# Patient Record
Sex: FEMALE | Race: BLACK OR AFRICAN AMERICAN | NOT HISPANIC OR LATINO
[De-identification: names, ages, dates, MRNs, and addresses within clinical notes are randomized per-mention and may not be internally consistent; named-entity substitution may affect disease eponyms.]

---

## 2017-01-31 ENCOUNTER — APPOINTMENT (OUTPATIENT)
Dept: SURGERY | Facility: CLINIC | Age: 62
End: 2017-01-31

## 2017-01-31 ENCOUNTER — APPOINTMENT (OUTPATIENT)
Dept: BARIATRICS | Facility: CLINIC | Age: 62
End: 2017-01-31

## 2017-01-31 VITALS
BODY MASS INDEX: 48.23 KG/M2 | HEART RATE: 91 BPM | HEIGHT: 65.5 IN | TEMPERATURE: 97.5 F | DIASTOLIC BLOOD PRESSURE: 80 MMHG | SYSTOLIC BLOOD PRESSURE: 165 MMHG | WEIGHT: 293 LBS | OXYGEN SATURATION: 95 %

## 2017-02-28 ENCOUNTER — APPOINTMENT (OUTPATIENT)
Dept: SURGERY | Facility: CLINIC | Age: 62
End: 2017-02-28

## 2017-02-28 VITALS
HEIGHT: 65.5 IN | BODY MASS INDEX: 48.23 KG/M2 | HEART RATE: 92 BPM | DIASTOLIC BLOOD PRESSURE: 76 MMHG | WEIGHT: 293 LBS | SYSTOLIC BLOOD PRESSURE: 176 MMHG | OXYGEN SATURATION: 93 % | TEMPERATURE: 97.7 F

## 2017-03-23 ENCOUNTER — APPOINTMENT (OUTPATIENT)
Dept: SURGERY | Facility: CLINIC | Age: 62
End: 2017-03-23

## 2017-03-23 VITALS — BODY MASS INDEX: 66.79 KG/M2 | WEIGHT: 293 LBS

## 2017-03-31 ENCOUNTER — APPOINTMENT (OUTPATIENT)
Dept: INTERNAL MEDICINE | Facility: CLINIC | Age: 62
End: 2017-03-31

## 2017-04-07 ENCOUNTER — APPOINTMENT (OUTPATIENT)
Dept: INTERNAL MEDICINE | Facility: CLINIC | Age: 62
End: 2017-04-07

## 2017-04-07 VITALS
WEIGHT: 293 LBS | BODY MASS INDEX: 68.15 KG/M2 | TEMPERATURE: 98.6 F | DIASTOLIC BLOOD PRESSURE: 84 MMHG | RESPIRATION RATE: 17 BRPM | OXYGEN SATURATION: 95 % | HEART RATE: 86 BPM | SYSTOLIC BLOOD PRESSURE: 142 MMHG

## 2017-04-07 VITALS — SYSTOLIC BLOOD PRESSURE: 145 MMHG | DIASTOLIC BLOOD PRESSURE: 80 MMHG

## 2017-04-07 DIAGNOSIS — R09.2 RESPIRATORY ARREST: ICD-10-CM

## 2017-04-07 DIAGNOSIS — Z87.01 PERSONAL HISTORY OF PNEUMONIA (RECURRENT): ICD-10-CM

## 2017-04-07 DIAGNOSIS — Z80.9 FAMILY HISTORY OF MALIGNANT NEOPLASM, UNSPECIFIED: ICD-10-CM

## 2017-04-07 DIAGNOSIS — T78.40XA ALLERGY, UNSPECIFIED, INITIAL ENCOUNTER: ICD-10-CM

## 2017-04-07 DIAGNOSIS — Z82.49 FAMILY HISTORY OF ISCHEMIC HEART DISEASE AND OTHER DISEASES OF THE CIRCULATORY SYSTEM: ICD-10-CM

## 2017-04-14 ENCOUNTER — LABORATORY RESULT (OUTPATIENT)
Age: 62
End: 2017-04-14

## 2017-04-14 ENCOUNTER — APPOINTMENT (OUTPATIENT)
Dept: CARDIOLOGY | Facility: CLINIC | Age: 62
End: 2017-04-14

## 2017-04-14 ENCOUNTER — NON-APPOINTMENT (OUTPATIENT)
Age: 62
End: 2017-04-14

## 2017-04-14 VITALS
TEMPERATURE: 97.9 F | SYSTOLIC BLOOD PRESSURE: 138 MMHG | RESPIRATION RATE: 20 BRPM | DIASTOLIC BLOOD PRESSURE: 81 MMHG | HEART RATE: 85 BPM | OXYGEN SATURATION: 100 % | BODY MASS INDEX: 67.29 KG/M2 | WEIGHT: 293 LBS

## 2017-04-24 ENCOUNTER — RESULT REVIEW (OUTPATIENT)
Age: 62
End: 2017-04-24

## 2017-04-24 DIAGNOSIS — R76.8 OTHER SPECIFIED ABNORMAL IMMUNOLOGICAL FINDINGS IN SERUM: ICD-10-CM

## 2017-04-24 DIAGNOSIS — B00.9 HERPESVIRAL INFECTION, UNSPECIFIED: ICD-10-CM

## 2017-04-24 DIAGNOSIS — R77.8 OTHER SPECIFIED ABNORMALITIES OF PLASMA PROTEINS: ICD-10-CM

## 2017-04-24 DIAGNOSIS — Z20.5 CONTACT WITH AND (SUSPECTED) EXPOSURE TO VIRAL HEPATITIS: ICD-10-CM

## 2017-04-25 ENCOUNTER — APPOINTMENT (OUTPATIENT)
Dept: SURGERY | Facility: CLINIC | Age: 62
End: 2017-04-25

## 2017-04-25 VITALS
HEIGHT: 64 IN | HEART RATE: 91 BPM | BODY MASS INDEX: 50.02 KG/M2 | DIASTOLIC BLOOD PRESSURE: 80 MMHG | SYSTOLIC BLOOD PRESSURE: 167 MMHG | WEIGHT: 293 LBS

## 2017-04-25 LAB
25(OH)D3 SERPL-MCNC: 24.8 NG/ML
ALBUMIN SERPL ELPH-MCNC: 4.2 G/DL
ALP BLD-CCNC: 117 U/L
ALT SERPL-CCNC: 12 U/L
ANION GAP SERPL CALC-SCNC: 16 MMOL/L
AST SERPL-CCNC: 21 U/L
BASOPHILS # BLD AUTO: 0.01 K/UL
BASOPHILS NFR BLD AUTO: 0.2 %
BILIRUB SERPL-MCNC: 0.3 MG/DL
BUN SERPL-MCNC: 13 MG/DL
CALCIUM SERPL-MCNC: 9.9 MG/DL
CHLORIDE SERPL-SCNC: 95 MMOL/L
CHOLEST SERPL-MCNC: 184 MG/DL
CHOLEST/HDLC SERPL: 1.9 RATIO
CO2 SERPL-SCNC: 31 MMOL/L
CREAT SERPL-MCNC: 0.86 MG/DL
EOSINOPHIL # BLD AUTO: 0.07 K/UL
EOSINOPHIL NFR BLD AUTO: 1.2 %
FOLATE SERPL-MCNC: 8.3 NG/ML
GLUCOSE SERPL-MCNC: 89 MG/DL
HAV IGG+IGM SER QL: REACTIVE
HBA1C MFR BLD HPLC: 5.8 %
HBV SURFACE AB SER QL: NONREACTIVE
HBV SURFACE AG SER QL: NONREACTIVE
HCT VFR BLD CALC: 35.3 %
HCV AB SER QL: NONREACTIVE
HCV S/CO RATIO: 0.15 S/CO
HDLC SERPL-MCNC: 96 MG/DL
HGB BLD-MCNC: 10.8 G/DL
HIV1+2 AB SPEC QL IA.RAPID: NONREACTIVE
HSV 1+2 IGG SER IA-IMP: POSITIVE
HSV 1+2 IGG SER IA-IMP: POSITIVE
HSV1 IGG SER QL: 52 INDEX
HSV2 IGG SER QL: 21.7 INDEX
IMM GRANULOCYTES NFR BLD AUTO: 0.2 %
LDLC SERPL CALC-MCNC: 48 MG/DL
LYMPHOCYTES # BLD AUTO: 1.38 K/UL
LYMPHOCYTES NFR BLD AUTO: 23 %
MAN DIFF?: NORMAL
MCHC RBC-ENTMCNC: 30.6 GM/DL
MCHC RBC-ENTMCNC: 31.8 PG
MCV RBC AUTO: 103.8 FL
MONOCYTES # BLD AUTO: 0.27 K/UL
MONOCYTES NFR BLD AUTO: 4.5 %
NEUTROPHILS # BLD AUTO: 4.26 K/UL
NEUTROPHILS NFR BLD AUTO: 70.9 %
PLATELET # BLD AUTO: 251 K/UL
POTASSIUM SERPL-SCNC: 4.4 MMOL/L
PROT SERPL-MCNC: 8.4 G/DL
RBC # BLD: 3.4 M/UL
RBC # FLD: 14.7 %
SODIUM SERPL-SCNC: 142 MMOL/L
T PALLIDUM AB SER QL IA: POSITIVE
T4 FREE SERPL-MCNC: 0.8 NG/DL
TRIGL SERPL-MCNC: 200 MG/DL
TSH SERPL-ACNC: 11.87 UIU/ML
URATE SERPL-MCNC: 10.9 MG/DL
VIT B12 SERPL-MCNC: >2000 PG/ML
WBC # FLD AUTO: 6 K/UL

## 2017-05-01 RX ORDER — PENICILLIN G BENZATHINE 2400000 [IU]/4ML
2400000 INJECTION, SUSPENSION INTRAMUSCULAR
Qty: 1 | Refills: 0 | Status: COMPLETED | OUTPATIENT
Start: 2017-05-01 | End: 1900-01-01

## 2017-05-09 ENCOUNTER — APPOINTMENT (OUTPATIENT)
Dept: CARDIOLOGY | Facility: CLINIC | Age: 62
End: 2017-05-09

## 2017-05-16 ENCOUNTER — APPOINTMENT (OUTPATIENT)
Dept: INTERNAL MEDICINE | Facility: CLINIC | Age: 62
End: 2017-05-16

## 2017-05-16 ENCOUNTER — APPOINTMENT (OUTPATIENT)
Dept: CARDIOLOGY | Facility: CLINIC | Age: 62
End: 2017-05-16

## 2017-05-16 VITALS
OXYGEN SATURATION: 95 % | TEMPERATURE: 98.1 F | HEART RATE: 93 BPM | WEIGHT: 293 LBS | SYSTOLIC BLOOD PRESSURE: 123 MMHG | DIASTOLIC BLOOD PRESSURE: 92 MMHG | BODY MASS INDEX: 67.46 KG/M2 | RESPIRATION RATE: 17 BRPM

## 2017-05-16 DIAGNOSIS — Z87.442 PERSONAL HISTORY OF URINARY CALCULI: ICD-10-CM

## 2017-05-25 ENCOUNTER — APPOINTMENT (OUTPATIENT)
Dept: INTERNAL MEDICINE | Facility: CLINIC | Age: 62
End: 2017-05-25

## 2017-05-31 ENCOUNTER — APPOINTMENT (OUTPATIENT)
Dept: INTERNAL MEDICINE | Facility: CLINIC | Age: 62
End: 2017-05-31

## 2017-06-01 ENCOUNTER — MEDICATION RENEWAL (OUTPATIENT)
Age: 62
End: 2017-06-01

## 2017-06-14 ENCOUNTER — APPOINTMENT (OUTPATIENT)
Dept: INTERNAL MEDICINE | Facility: CLINIC | Age: 62
End: 2017-06-14

## 2017-06-14 ENCOUNTER — APPOINTMENT (OUTPATIENT)
Dept: SURGERY | Facility: CLINIC | Age: 62
End: 2017-06-14

## 2017-06-14 VITALS
SYSTOLIC BLOOD PRESSURE: 194 MMHG | BODY MASS INDEX: 50.02 KG/M2 | HEART RATE: 97 BPM | OXYGEN SATURATION: 92 % | RESPIRATION RATE: 17 BRPM | HEIGHT: 64 IN | TEMPERATURE: 98 F | DIASTOLIC BLOOD PRESSURE: 69 MMHG | WEIGHT: 293 LBS

## 2017-06-14 RX ORDER — PENICILLIN G BENZATHINE 2400000 [IU]/4ML
2400000 INJECTION, SUSPENSION INTRAMUSCULAR
Qty: 1 | Refills: 0 | Status: COMPLETED | OUTPATIENT
Start: 2017-05-03

## 2017-06-22 ENCOUNTER — APPOINTMENT (OUTPATIENT)
Dept: INTERNAL MEDICINE | Facility: CLINIC | Age: 62
End: 2017-06-22

## 2017-06-22 VITALS
DIASTOLIC BLOOD PRESSURE: 81 MMHG | SYSTOLIC BLOOD PRESSURE: 147 MMHG | OXYGEN SATURATION: 97 % | RESPIRATION RATE: 17 BRPM | HEART RATE: 87 BPM | TEMPERATURE: 97.8 F

## 2017-06-22 RX ORDER — PENICILLIN G BENZATHINE 2400000 [IU]/4ML
2400000 INJECTION, SUSPENSION INTRAMUSCULAR
Qty: 1 | Refills: 0 | Status: COMPLETED | OUTPATIENT
Start: 2017-05-10

## 2017-06-23 ENCOUNTER — RESULT REVIEW (OUTPATIENT)
Age: 62
End: 2017-06-23

## 2017-06-23 DIAGNOSIS — Z83.2 FAMILY HISTORY OF DISEASES OF THE BLOOD AND BLOOD-FORMING ORGANS AND CERTAIN DISORDERS INVOLVING THE IMMUNE MECHANISM: ICD-10-CM

## 2017-06-23 LAB
BASOPHILS # BLD AUTO: 0.01 K/UL
BASOPHILS NFR BLD AUTO: 0.2 %
EOSINOPHIL # BLD AUTO: 0.14 K/UL
EOSINOPHIL NFR BLD AUTO: 2.4 %
FERRITIN SERPL-MCNC: 139 NG/ML
HCT VFR BLD CALC: 30.9 %
HGB BLD-MCNC: 9.5 G/DL
IMM GRANULOCYTES NFR BLD AUTO: 0.2 %
IRON SATN MFR SERPL: 27 %
IRON SERPL-MCNC: 84 UG/DL
LYMPHOCYTES # BLD AUTO: 1.56 K/UL
LYMPHOCYTES NFR BLD AUTO: 26.6 %
MAN DIFF?: NORMAL
MCHC RBC-ENTMCNC: 30.5 PG
MCHC RBC-ENTMCNC: 30.7 GM/DL
MCV RBC AUTO: 99.4 FL
MONOCYTES # BLD AUTO: 0.35 K/UL
MONOCYTES NFR BLD AUTO: 6 %
NEUTROPHILS # BLD AUTO: 3.79 K/UL
NEUTROPHILS NFR BLD AUTO: 64.6 %
PLATELET # BLD AUTO: 183 K/UL
RBC # BLD: 3.11 M/UL
RBC # FLD: 14.1 %
T4 FREE SERPL-MCNC: 0.9 NG/DL
TIBC SERPL-MCNC: 306 UG/DL
TSH SERPL-ACNC: 3.7 UIU/ML
UIBC SERPL-MCNC: 222 UG/DL
WBC # FLD AUTO: 5.86 K/UL

## 2017-06-28 ENCOUNTER — APPOINTMENT (OUTPATIENT)
Dept: INTERNAL MEDICINE | Facility: CLINIC | Age: 62
End: 2017-06-28

## 2017-07-19 ENCOUNTER — APPOINTMENT (OUTPATIENT)
Dept: INTERNAL MEDICINE | Facility: CLINIC | Age: 62
End: 2017-07-19

## 2017-07-19 ENCOUNTER — LABORATORY RESULT (OUTPATIENT)
Age: 62
End: 2017-07-19

## 2017-07-19 VITALS
RESPIRATION RATE: 17 BRPM | WEIGHT: 293 LBS | BODY MASS INDEX: 64.37 KG/M2 | OXYGEN SATURATION: 96 % | HEART RATE: 89 BPM | DIASTOLIC BLOOD PRESSURE: 84 MMHG | SYSTOLIC BLOOD PRESSURE: 147 MMHG | TEMPERATURE: 97.9 F

## 2017-07-19 DIAGNOSIS — Z12.39 ENCOUNTER FOR OTHER SCREENING FOR MALIGNANT NEOPLASM OF BREAST: ICD-10-CM

## 2017-07-19 RX ORDER — PENICILLIN G BENZATHINE 2400000 [IU]/4ML
2400000 INJECTION, SUSPENSION INTRAMUSCULAR
Qty: 1 | Refills: 0 | Status: COMPLETED | OUTPATIENT
Start: 2017-05-17

## 2017-07-20 LAB
APPEARANCE: ABNORMAL
BILIRUBIN URINE: NEGATIVE
BLOOD URINE: ABNORMAL
C TRACH RRNA SPEC QL NAA+PROBE: NORMAL
COLOR: YELLOW
GLUCOSE QUALITATIVE U: NORMAL MG/DL
KETONES URINE: NEGATIVE
LEUKOCYTE ESTERASE URINE: ABNORMAL
N GONORRHOEA RRNA SPEC QL NAA+PROBE: NORMAL
NITRITE URINE: NEGATIVE
PH URINE: 6
PROTEIN URINE: 30 MG/DL
SOURCE AMPLIFICATION: NORMAL
SOURCE AMPLIFICATION: NORMAL
SPECIFIC GRAVITY URINE: 1.01
T VAGINALIS RRNA SPEC QL NAA+PROBE: NORMAL
UROBILINOGEN URINE: NORMAL MG/DL

## 2017-09-12 ENCOUNTER — APPOINTMENT (OUTPATIENT)
Dept: SURGERY | Facility: CLINIC | Age: 62
End: 2017-09-12
Payer: MEDICARE

## 2017-09-12 VITALS
SYSTOLIC BLOOD PRESSURE: 129 MMHG | BODY MASS INDEX: 50.02 KG/M2 | DIASTOLIC BLOOD PRESSURE: 77 MMHG | OXYGEN SATURATION: 96 % | TEMPERATURE: 98.2 F | HEIGHT: 64 IN | WEIGHT: 293 LBS | HEART RATE: 79 BPM

## 2017-09-12 PROCEDURE — 99214 OFFICE O/P EST MOD 30 MIN: CPT

## 2017-09-13 ENCOUNTER — APPOINTMENT (OUTPATIENT)
Dept: INTERNAL MEDICINE | Facility: CLINIC | Age: 62
End: 2017-09-13
Payer: MEDICARE

## 2017-09-13 VITALS
TEMPERATURE: 97.5 F | OXYGEN SATURATION: 97 % | SYSTOLIC BLOOD PRESSURE: 172 MMHG | DIASTOLIC BLOOD PRESSURE: 82 MMHG | RESPIRATION RATE: 17 BRPM | WEIGHT: 293 LBS | HEART RATE: 95 BPM | BODY MASS INDEX: 63 KG/M2

## 2017-09-13 DIAGNOSIS — Z20.2 CONTACT WITH AND (SUSPECTED) EXPOSURE TO INFECTIONS WITH A PREDOMINANTLY SEXUAL MODE OF TRANSMISSION: ICD-10-CM

## 2017-09-13 DIAGNOSIS — A53.0 LATENT SYPHILIS, UNSPECIFIED AS EARLY OR LATE: ICD-10-CM

## 2017-09-13 PROCEDURE — 99214 OFFICE O/P EST MOD 30 MIN: CPT

## 2017-09-13 PROCEDURE — 93000 ELECTROCARDIOGRAM COMPLETE: CPT

## 2017-09-14 ENCOUNTER — RESULT REVIEW (OUTPATIENT)
Age: 62
End: 2017-09-14

## 2017-09-14 DIAGNOSIS — R73.09 OTHER ABNORMAL GLUCOSE: ICD-10-CM

## 2017-09-14 DIAGNOSIS — D75.89 OTHER SPECIFIED DISEASES OF BLOOD AND BLOOD-FORMING ORGANS: ICD-10-CM

## 2017-09-15 LAB
ANION GAP SERPL CALC-SCNC: 17 MMOL/L
APTT BLD: 29 SEC
BASOPHILS # BLD AUTO: 0.01 K/UL
BASOPHILS NFR BLD AUTO: 0.2 %
BUN SERPL-MCNC: 23 MG/DL
CALCIUM SERPL-MCNC: 9.7 MG/DL
CHLORIDE SERPL-SCNC: 103 MMOL/L
CO2 SERPL-SCNC: 25 MMOL/L
CREAT SERPL-MCNC: 0.97 MG/DL
EOSINOPHIL # BLD AUTO: 0.19 K/UL
EOSINOPHIL NFR BLD AUTO: 3.2 %
GLUCOSE SERPL-MCNC: 92 MG/DL
HBA1C MFR BLD HPLC: 5.2 %
HCT VFR BLD CALC: 30.5 %
HGB BLD-MCNC: 9.2 G/DL
IMM GRANULOCYTES NFR BLD AUTO: 0.2 %
INR PPP: 0.92 RATIO
LYMPHOCYTES # BLD AUTO: 1.84 K/UL
LYMPHOCYTES NFR BLD AUTO: 31.1 %
MAN DIFF?: NORMAL
MCHC RBC-ENTMCNC: 29.2 PG
MCHC RBC-ENTMCNC: 30.2 GM/DL
MCV RBC AUTO: 96.8 FL
MONOCYTES # BLD AUTO: 0.38 K/UL
MONOCYTES NFR BLD AUTO: 6.4 %
NEUTROPHILS # BLD AUTO: 3.48 K/UL
NEUTROPHILS NFR BLD AUTO: 58.9 %
PLATELET # BLD AUTO: 321 K/UL
POTASSIUM SERPL-SCNC: 4.3 MMOL/L
PT BLD: 10.4 SEC
RBC # BLD: 3.15 M/UL
RBC # FLD: 14.9 %
SODIUM SERPL-SCNC: 145 MMOL/L
TSH SERPL-ACNC: 3.55 UIU/ML
UREA BREATH TEST QL: NEGATIVE
WBC # FLD AUTO: 5.91 K/UL

## 2017-09-25 ENCOUNTER — LABORATORY RESULT (OUTPATIENT)
Age: 62
End: 2017-09-25

## 2017-09-25 ENCOUNTER — APPOINTMENT (OUTPATIENT)
Dept: CARDIOLOGY | Facility: CLINIC | Age: 62
End: 2017-09-25
Payer: MEDICARE

## 2017-09-25 ENCOUNTER — NON-APPOINTMENT (OUTPATIENT)
Age: 62
End: 2017-09-25

## 2017-09-25 PROCEDURE — 71020: CPT

## 2017-10-03 LAB
APPEARANCE: ABNORMAL
BILIRUBIN URINE: NEGATIVE
BLOOD URINE: ABNORMAL
COLOR: YELLOW
GLUCOSE QUALITATIVE U: NORMAL MG/DL
KETONES URINE: NEGATIVE
LEUKOCYTE ESTERASE URINE: ABNORMAL
NITRITE URINE: NEGATIVE
PH URINE: 6
PROTEIN URINE: NEGATIVE MG/DL
SPECIFIC GRAVITY URINE: 1.01
UROBILINOGEN URINE: NORMAL MG/DL

## 2017-12-01 ENCOUNTER — RX RENEWAL (OUTPATIENT)
Age: 62
End: 2017-12-01

## 2018-01-16 ENCOUNTER — APPOINTMENT (OUTPATIENT)
Dept: SURGERY | Facility: CLINIC | Age: 63
End: 2018-01-16
Payer: MEDICARE

## 2018-01-16 VITALS
HEIGHT: 64 IN | BODY MASS INDEX: 50.02 KG/M2 | OXYGEN SATURATION: 94 % | HEART RATE: 91 BPM | WEIGHT: 293 LBS | DIASTOLIC BLOOD PRESSURE: 76 MMHG | TEMPERATURE: 97.7 F | SYSTOLIC BLOOD PRESSURE: 145 MMHG

## 2018-01-16 PROCEDURE — 99214 OFFICE O/P EST MOD 30 MIN: CPT

## 2018-01-25 ENCOUNTER — APPOINTMENT (OUTPATIENT)
Dept: INTERNAL MEDICINE | Facility: CLINIC | Age: 63
End: 2018-01-25
Payer: MEDICARE

## 2018-01-25 ENCOUNTER — NON-APPOINTMENT (OUTPATIENT)
Age: 63
End: 2018-01-25

## 2018-01-25 VITALS
DIASTOLIC BLOOD PRESSURE: 83 MMHG | SYSTOLIC BLOOD PRESSURE: 178 MMHG | HEART RATE: 81 BPM | BODY MASS INDEX: 62.82 KG/M2 | TEMPERATURE: 97.6 F | WEIGHT: 293 LBS | RESPIRATION RATE: 18 BRPM | OXYGEN SATURATION: 97 %

## 2018-01-25 VITALS — DIASTOLIC BLOOD PRESSURE: 75 MMHG | SYSTOLIC BLOOD PRESSURE: 170 MMHG

## 2018-01-25 DIAGNOSIS — Z23 ENCOUNTER FOR IMMUNIZATION: ICD-10-CM

## 2018-01-25 PROCEDURE — 90746 HEPB VACCINE 3 DOSE ADULT IM: CPT

## 2018-01-25 PROCEDURE — G0010: CPT

## 2018-01-25 PROCEDURE — 99214 OFFICE O/P EST MOD 30 MIN: CPT | Mod: 25

## 2018-02-01 ENCOUNTER — APPOINTMENT (OUTPATIENT)
Dept: INTERNAL MEDICINE | Facility: CLINIC | Age: 63
End: 2018-02-01

## 2018-02-16 ENCOUNTER — APPOINTMENT (OUTPATIENT)
Dept: INTERNAL MEDICINE | Facility: CLINIC | Age: 63
End: 2018-02-16
Payer: MEDICARE

## 2018-02-16 VITALS
HEART RATE: 80 BPM | SYSTOLIC BLOOD PRESSURE: 136 MMHG | TEMPERATURE: 97.9 F | DIASTOLIC BLOOD PRESSURE: 60 MMHG | HEIGHT: 66 IN | BODY MASS INDEX: 47.09 KG/M2 | WEIGHT: 293 LBS | RESPIRATION RATE: 16 BRPM | OXYGEN SATURATION: 96 %

## 2018-02-16 DIAGNOSIS — Z86.69 PERSONAL HISTORY OF OTHER DISEASES OF THE NERVOUS SYSTEM AND SENSE ORGANS: ICD-10-CM

## 2018-02-16 PROCEDURE — 36415 COLL VENOUS BLD VENIPUNCTURE: CPT

## 2018-02-16 PROCEDURE — 99201 OFFICE OUTPATIENT NEW 10 MINUTES: CPT | Mod: 25

## 2018-02-16 PROCEDURE — G0438: CPT

## 2018-02-22 ENCOUNTER — APPOINTMENT (OUTPATIENT)
Dept: SURGERY | Facility: CLINIC | Age: 63
End: 2018-02-22

## 2018-02-26 LAB
25(OH)D3 SERPL-MCNC: 17.1 NG/ML
ALBUMIN SERPL ELPH-MCNC: 4.1 G/DL
ALP BLD-CCNC: 172 U/L
ALT SERPL-CCNC: 20 U/L
ANION GAP SERPL CALC-SCNC: 17 MMOL/L
AST SERPL-CCNC: 29 U/L
BASOPHILS # BLD AUTO: 0.01 K/UL
BASOPHILS NFR BLD AUTO: 0.2 %
BILIRUB SERPL-MCNC: 0.4 MG/DL
BUN SERPL-MCNC: 29 MG/DL
CALCIUM SERPL-MCNC: 9.4 MG/DL
CHLORIDE SERPL-SCNC: 100 MMOL/L
CHOLEST SERPL-MCNC: 205 MG/DL
CHOLEST/HDLC SERPL: 2.2 RATIO
CO2 SERPL-SCNC: 25 MMOL/L
CREAT SERPL-MCNC: 1.39 MG/DL
EOSINOPHIL # BLD AUTO: 0.08 K/UL
EOSINOPHIL NFR BLD AUTO: 1.5 %
GLUCOSE SERPL-MCNC: 107 MG/DL
HBA1C MFR BLD HPLC: 5.3 %
HCT VFR BLD CALC: 30.9 %
HDLC SERPL-MCNC: 95 MG/DL
HGB BLD-MCNC: 9.8 G/DL
IMM GRANULOCYTES NFR BLD AUTO: 0.2 %
LDLC SERPL CALC-MCNC: 85 MG/DL
LYMPHOCYTES # BLD AUTO: 1.25 K/UL
LYMPHOCYTES NFR BLD AUTO: 23.1 %
MAN DIFF?: NORMAL
MCHC RBC-ENTMCNC: 30.5 PG
MCHC RBC-ENTMCNC: 31.7 GM/DL
MCV RBC AUTO: 96.3 FL
MONOCYTES # BLD AUTO: 0.25 K/UL
MONOCYTES NFR BLD AUTO: 4.6 %
NEUTROPHILS # BLD AUTO: 3.82 K/UL
NEUTROPHILS NFR BLD AUTO: 70.4 %
PLATELET # BLD AUTO: 242 K/UL
POTASSIUM SERPL-SCNC: 4.3 MMOL/L
PROT SERPL-MCNC: 8 G/DL
RBC # BLD: 3.21 M/UL
RBC # FLD: 14.2 %
SODIUM SERPL-SCNC: 142 MMOL/L
TRIGL SERPL-MCNC: 123 MG/DL
TSH SERPL-ACNC: 3.32 UIU/ML
WBC # FLD AUTO: 5.42 K/UL

## 2018-02-27 ENCOUNTER — RESULT REVIEW (OUTPATIENT)
Age: 63
End: 2018-02-27

## 2018-02-27 ENCOUNTER — MESSAGE (OUTPATIENT)
Age: 63
End: 2018-02-27

## 2018-03-08 ENCOUNTER — APPOINTMENT (OUTPATIENT)
Dept: SURGERY | Facility: CLINIC | Age: 63
End: 2018-03-08

## 2018-03-08 VITALS — WEIGHT: 293 LBS | BODY MASS INDEX: 62.82 KG/M2

## 2018-03-13 ENCOUNTER — APPOINTMENT (OUTPATIENT)
Dept: INTERNAL MEDICINE | Facility: CLINIC | Age: 63
End: 2018-03-13
Payer: MEDICARE

## 2018-03-13 ENCOUNTER — LABORATORY RESULT (OUTPATIENT)
Age: 63
End: 2018-03-13

## 2018-03-13 VITALS
TEMPERATURE: 98.1 F | WEIGHT: 293 LBS | SYSTOLIC BLOOD PRESSURE: 152 MMHG | OXYGEN SATURATION: 96 % | BODY MASS INDEX: 47.09 KG/M2 | DIASTOLIC BLOOD PRESSURE: 68 MMHG | HEIGHT: 66 IN | HEART RATE: 81 BPM | RESPIRATION RATE: 16 BRPM

## 2018-03-13 PROCEDURE — 36415 COLL VENOUS BLD VENIPUNCTURE: CPT

## 2018-03-13 PROCEDURE — 99215 OFFICE O/P EST HI 40 MIN: CPT | Mod: 25

## 2018-03-14 ENCOUNTER — MEDICATION RENEWAL (OUTPATIENT)
Age: 63
End: 2018-03-14

## 2018-03-14 LAB
ALBUMIN SERPL ELPH-MCNC: 4 G/DL
ALP BLD-CCNC: 127 U/L
ALT SERPL-CCNC: 14 U/L
ANION GAP SERPL CALC-SCNC: 14 MMOL/L
APPEARANCE: ABNORMAL
APTT BLD: 26.5 SEC
AST SERPL-CCNC: 20 U/L
BASOPHILS # BLD AUTO: 0.01 K/UL
BASOPHILS NFR BLD AUTO: 0.2 %
BILIRUB SERPL-MCNC: 0.2 MG/DL
BILIRUBIN URINE: NEGATIVE
BLOOD URINE: ABNORMAL
BUN SERPL-MCNC: 15 MG/DL
CALCIUM SERPL-MCNC: 9 MG/DL
CHLORIDE SERPL-SCNC: 105 MMOL/L
CO2 SERPL-SCNC: 22 MMOL/L
COLOR: YELLOW
CREAT SERPL-MCNC: 1.08 MG/DL
EOSINOPHIL # BLD AUTO: 0.14 K/UL
EOSINOPHIL NFR BLD AUTO: 2.4 %
GLUCOSE QUALITATIVE U: NEGATIVE MG/DL
GLUCOSE SERPL-MCNC: 102 MG/DL
HCT VFR BLD CALC: 30.3 %
HGB BLD-MCNC: 9.1 G/DL
IMM GRANULOCYTES NFR BLD AUTO: 0.2 %
INR PPP: 0.9 RATIO
KETONES URINE: NEGATIVE
LEUKOCYTE ESTERASE URINE: ABNORMAL
LYMPHOCYTES # BLD AUTO: 1.46 K/UL
LYMPHOCYTES NFR BLD AUTO: 24.5 %
MAN DIFF?: NORMAL
MCHC RBC-ENTMCNC: 29.4 PG
MCHC RBC-ENTMCNC: 30 GM/DL
MCV RBC AUTO: 98.1 FL
MONOCYTES # BLD AUTO: 0.26 K/UL
MONOCYTES NFR BLD AUTO: 4.4 %
NEUTROPHILS # BLD AUTO: 4.07 K/UL
NEUTROPHILS NFR BLD AUTO: 68.3 %
NITRITE URINE: NEGATIVE
PH URINE: 5.5
PLATELET # BLD AUTO: 216 K/UL
POTASSIUM SERPL-SCNC: 4.1 MMOL/L
PROT SERPL-MCNC: 7.4 G/DL
PROTEIN URINE: ABNORMAL MG/DL
PT BLD: 10.2 SEC
RBC # BLD: 3.09 M/UL
RBC # FLD: 14.1 %
SODIUM SERPL-SCNC: 141 MMOL/L
SPECIFIC GRAVITY URINE: 1.01
T3 SERPL-MCNC: 78 NG/DL
T4 SERPL-MCNC: 4.4 UG/DL
TSH SERPL-ACNC: 4.35 UIU/ML
UROBILINOGEN URINE: NEGATIVE MG/DL
WBC # FLD AUTO: 5.95 K/UL

## 2018-03-16 RX ORDER — ALLOPURINOL 300 MG
1 TABLET ORAL
Qty: 0 | Refills: 0 | COMMUNITY

## 2018-03-16 NOTE — PATIENT PROFILE ADULT. - PMH
Asthma    Gout    HSV (herpes simplex virus) infection    HTN (hypertension)    Hypothyroid    SOB (shortness of breath)  R/T inhalation of chemicals at work ( [employer] )

## 2018-03-19 ENCOUNTER — APPOINTMENT (OUTPATIENT)
Dept: SURGERY | Facility: HOSPITAL | Age: 63
End: 2018-03-19

## 2018-03-19 ENCOUNTER — INPATIENT (INPATIENT)
Facility: HOSPITAL | Age: 63
LOS: 3 days | Discharge: NURSING, REHAB OR LONG-TERM CARE | DRG: 621 | End: 2018-03-23
Attending: SURGERY | Admitting: SURGERY
Payer: MEDICARE

## 2018-03-19 ENCOUNTER — RESULT REVIEW (OUTPATIENT)
Age: 63
End: 2018-03-19

## 2018-03-19 VITALS
RESPIRATION RATE: 16 BRPM | HEIGHT: 67 IN | DIASTOLIC BLOOD PRESSURE: 77 MMHG | HEART RATE: 73 BPM | SYSTOLIC BLOOD PRESSURE: 150 MMHG | WEIGHT: 293 LBS | TEMPERATURE: 98 F | OXYGEN SATURATION: 100 %

## 2018-03-19 DIAGNOSIS — E03.9 HYPOTHYROIDISM, UNSPECIFIED: ICD-10-CM

## 2018-03-19 DIAGNOSIS — J45.909 UNSPECIFIED ASTHMA, UNCOMPLICATED: ICD-10-CM

## 2018-03-19 DIAGNOSIS — I10 ESSENTIAL (PRIMARY) HYPERTENSION: ICD-10-CM

## 2018-03-19 DIAGNOSIS — Z41.9 ENCOUNTER FOR PROCEDURE FOR PURPOSES OTHER THAN REMEDYING HEALTH STATE, UNSPECIFIED: Chronic | ICD-10-CM

## 2018-03-19 DIAGNOSIS — M10.9 GOUT, UNSPECIFIED: ICD-10-CM

## 2018-03-19 DIAGNOSIS — E66.01 MORBID (SEVERE) OBESITY DUE TO EXCESS CALORIES: ICD-10-CM

## 2018-03-19 LAB
ANION GAP SERPL CALC-SCNC: 11 MMOL/L — SIGNIFICANT CHANGE UP (ref 5–17)
BASE EXCESS BLDA CALC-SCNC: -2.7 MMOL/L — LOW (ref -2–3)
BUN SERPL-MCNC: 15 MG/DL — SIGNIFICANT CHANGE UP (ref 7–23)
CALCIUM SERPL-MCNC: 9 MG/DL — SIGNIFICANT CHANGE UP (ref 8.4–10.5)
CHLORIDE SERPL-SCNC: 102 MMOL/L — SIGNIFICANT CHANGE UP (ref 96–108)
CO2 SERPL-SCNC: 26 MMOL/L — SIGNIFICANT CHANGE UP (ref 22–31)
CREAT SERPL-MCNC: 1.09 MG/DL — SIGNIFICANT CHANGE UP (ref 0.5–1.3)
GAS PNL BLDA: SIGNIFICANT CHANGE UP
GLUCOSE BLDC GLUCOMTR-MCNC: 118 MG/DL — HIGH (ref 70–99)
GLUCOSE BLDC GLUCOMTR-MCNC: 146 MG/DL — HIGH (ref 70–99)
GLUCOSE SERPL-MCNC: 153 MG/DL — HIGH (ref 70–99)
HCO3 BLDA-SCNC: 24 MMOL/L — SIGNIFICANT CHANGE UP (ref 21–28)
HCT VFR BLD CALC: 26.5 % — LOW (ref 34.5–45)
HGB BLD-MCNC: 8.6 G/DL — LOW (ref 11.5–15.5)
MAGNESIUM SERPL-MCNC: 1.4 MG/DL — LOW (ref 1.6–2.6)
MCHC RBC-ENTMCNC: 30.8 PG — SIGNIFICANT CHANGE UP (ref 27–34)
MCHC RBC-ENTMCNC: 32.5 G/DL — SIGNIFICANT CHANGE UP (ref 32–36)
MCV RBC AUTO: 95 FL — SIGNIFICANT CHANGE UP (ref 80–100)
PCO2 BLDA: 48 MMHG — HIGH (ref 32–45)
PH BLDA: 7.31 — LOW (ref 7.35–7.45)
PHOSPHATE SERPL-MCNC: 3.4 MG/DL — SIGNIFICANT CHANGE UP (ref 2.5–4.5)
PLATELET # BLD AUTO: 195 K/UL — SIGNIFICANT CHANGE UP (ref 150–400)
PO2 BLDA: 164 MMHG — HIGH (ref 83–108)
POTASSIUM SERPL-MCNC: 3.6 MMOL/L — SIGNIFICANT CHANGE UP (ref 3.5–5.3)
POTASSIUM SERPL-SCNC: 3.6 MMOL/L — SIGNIFICANT CHANGE UP (ref 3.5–5.3)
RBC # BLD: 2.79 M/UL — LOW (ref 3.8–5.2)
RBC # FLD: 13.1 % — SIGNIFICANT CHANGE UP (ref 10.3–16.9)
SAO2 % BLDA: 99 % — SIGNIFICANT CHANGE UP (ref 95–100)
SODIUM SERPL-SCNC: 139 MMOL/L — SIGNIFICANT CHANGE UP (ref 135–145)
WBC # BLD: 7 K/UL — SIGNIFICANT CHANGE UP (ref 3.8–10.5)
WBC # FLD AUTO: 7 K/UL — SIGNIFICANT CHANGE UP (ref 3.8–10.5)

## 2018-03-19 PROCEDURE — 43775 LAP SLEEVE GASTRECTOMY: CPT

## 2018-03-19 PROCEDURE — 71045 X-RAY EXAM CHEST 1 VIEW: CPT | Mod: 26

## 2018-03-19 RX ORDER — POTASSIUM CHLORIDE 20 MEQ
10 PACKET (EA) ORAL
Qty: 0 | Refills: 0 | Status: COMPLETED | OUTPATIENT
Start: 2018-03-19 | End: 2018-03-19

## 2018-03-19 RX ORDER — OXYCODONE AND ACETAMINOPHEN 5; 325 MG/1; MG/1
1 TABLET ORAL EVERY 4 HOURS
Qty: 0 | Refills: 0 | Status: DISCONTINUED | OUTPATIENT
Start: 2018-03-19 | End: 2018-03-19

## 2018-03-19 RX ORDER — AMLODIPINE BESYLATE 2.5 MG/1
10 TABLET ORAL DAILY
Qty: 0 | Refills: 0 | Status: DISCONTINUED | OUTPATIENT
Start: 2018-03-19 | End: 2018-03-23

## 2018-03-19 RX ORDER — PANTOPRAZOLE SODIUM 20 MG/1
40 TABLET, DELAYED RELEASE ORAL DAILY
Qty: 0 | Refills: 0 | Status: DISCONTINUED | OUTPATIENT
Start: 2018-03-19 | End: 2018-03-23

## 2018-03-19 RX ORDER — CHLORHEXIDINE GLUCONATE 213 G/1000ML
15 SOLUTION TOPICAL
Qty: 0 | Refills: 0 | Status: DISCONTINUED | OUTPATIENT
Start: 2018-03-19 | End: 2018-03-20

## 2018-03-19 RX ORDER — ENOXAPARIN SODIUM 100 MG/ML
40 INJECTION SUBCUTANEOUS EVERY 12 HOURS
Qty: 0 | Refills: 0 | Status: DISCONTINUED | OUTPATIENT
Start: 2018-03-19 | End: 2018-03-23

## 2018-03-19 RX ORDER — PROPOFOL 10 MG/ML
1 INJECTION, EMULSION INTRAVENOUS
Qty: 1000 | Refills: 0 | Status: DISCONTINUED | OUTPATIENT
Start: 2018-03-19 | End: 2018-03-20

## 2018-03-19 RX ORDER — DEXTROSE 50 % IN WATER 50 %
25 SYRINGE (ML) INTRAVENOUS ONCE
Qty: 0 | Refills: 0 | Status: DISCONTINUED | OUTPATIENT
Start: 2018-03-19 | End: 2018-03-23

## 2018-03-19 RX ORDER — MAGNESIUM SULFATE 500 MG/ML
2 VIAL (ML) INJECTION ONCE
Qty: 0 | Refills: 0 | Status: COMPLETED | OUTPATIENT
Start: 2018-03-19 | End: 2018-03-19

## 2018-03-19 RX ORDER — OXYCODONE AND ACETAMINOPHEN 5; 325 MG/1; MG/1
2 TABLET ORAL EVERY 4 HOURS
Qty: 0 | Refills: 0 | Status: DISCONTINUED | OUTPATIENT
Start: 2018-03-19 | End: 2018-03-19

## 2018-03-19 RX ORDER — SODIUM CHLORIDE 9 MG/ML
1000 INJECTION, SOLUTION INTRAVENOUS
Qty: 0 | Refills: 0 | Status: DISCONTINUED | OUTPATIENT
Start: 2018-03-19 | End: 2018-03-23

## 2018-03-19 RX ORDER — HYDROMORPHONE HYDROCHLORIDE 2 MG/ML
1 INJECTION INTRAMUSCULAR; INTRAVENOUS; SUBCUTANEOUS
Qty: 0 | Refills: 0 | Status: DISCONTINUED | OUTPATIENT
Start: 2018-03-19 | End: 2018-03-19

## 2018-03-19 RX ORDER — INSULIN LISPRO 100/ML
VIAL (ML) SUBCUTANEOUS
Qty: 0 | Refills: 0 | Status: DISCONTINUED | OUTPATIENT
Start: 2018-03-19 | End: 2018-03-23

## 2018-03-19 RX ORDER — DEXTROSE 50 % IN WATER 50 %
12.5 SYRINGE (ML) INTRAVENOUS ONCE
Qty: 0 | Refills: 0 | Status: DISCONTINUED | OUTPATIENT
Start: 2018-03-19 | End: 2018-03-23

## 2018-03-19 RX ORDER — ALBUTEROL 90 UG/1
2 AEROSOL, METERED ORAL EVERY 6 HOURS
Qty: 0 | Refills: 0 | Status: DISCONTINUED | OUTPATIENT
Start: 2018-03-19 | End: 2018-03-19

## 2018-03-19 RX ORDER — FENTANYL CITRATE 50 UG/ML
0.3 INJECTION INTRAVENOUS
Qty: 2500 | Refills: 0 | Status: DISCONTINUED | OUTPATIENT
Start: 2018-03-19 | End: 2018-03-20

## 2018-03-19 RX ORDER — ACETAMINOPHEN 500 MG
1000 TABLET ORAL ONCE
Qty: 0 | Refills: 0 | Status: COMPLETED | OUTPATIENT
Start: 2018-03-19 | End: 2018-03-20

## 2018-03-19 RX ORDER — AMLODIPINE BESYLATE 2.5 MG/1
1 TABLET ORAL
Qty: 0 | Refills: 0 | COMMUNITY

## 2018-03-19 RX ORDER — ONDANSETRON 8 MG/1
4 TABLET, FILM COATED ORAL EVERY 6 HOURS
Qty: 0 | Refills: 0 | Status: COMPLETED | OUTPATIENT
Start: 2018-03-19 | End: 2018-03-19

## 2018-03-19 RX ORDER — SCOPALAMINE 1 MG/3D
1 PATCH, EXTENDED RELEASE TRANSDERMAL ONCE
Qty: 0 | Refills: 0 | Status: COMPLETED | OUTPATIENT
Start: 2018-03-19 | End: 2018-03-19

## 2018-03-19 RX ORDER — PANTOPRAZOLE SODIUM 20 MG/1
40 TABLET, DELAYED RELEASE ORAL
Qty: 0 | Refills: 0 | Status: DISCONTINUED | OUTPATIENT
Start: 2018-03-19 | End: 2018-03-19

## 2018-03-19 RX ORDER — LEVOTHYROXINE SODIUM 125 MCG
44 TABLET ORAL AT BEDTIME
Qty: 0 | Refills: 0 | Status: DISCONTINUED | OUTPATIENT
Start: 2018-03-19 | End: 2018-03-23

## 2018-03-19 RX ORDER — METOCLOPRAMIDE HCL 10 MG
10 TABLET ORAL EVERY 6 HOURS
Qty: 0 | Refills: 0 | Status: DISCONTINUED | OUTPATIENT
Start: 2018-03-19 | End: 2018-03-20

## 2018-03-19 RX ORDER — ENOXAPARIN SODIUM 100 MG/ML
40 INJECTION SUBCUTANEOUS ONCE
Qty: 0 | Refills: 0 | Status: COMPLETED | OUTPATIENT
Start: 2018-03-19 | End: 2018-03-19

## 2018-03-19 RX ORDER — ALBUTEROL 90 UG/1
2 AEROSOL, METERED ORAL EVERY 6 HOURS
Qty: 0 | Refills: 0 | Status: DISCONTINUED | OUTPATIENT
Start: 2018-03-19 | End: 2018-03-20

## 2018-03-19 RX ORDER — LEVOTHYROXINE SODIUM 125 MCG
88 TABLET ORAL DAILY
Qty: 0 | Refills: 0 | Status: DISCONTINUED | OUTPATIENT
Start: 2018-03-19 | End: 2018-03-19

## 2018-03-19 RX ORDER — DEXTROSE 50 % IN WATER 50 %
1 SYRINGE (ML) INTRAVENOUS ONCE
Qty: 0 | Refills: 0 | Status: DISCONTINUED | OUTPATIENT
Start: 2018-03-19 | End: 2018-03-23

## 2018-03-19 RX ORDER — GLUCAGON INJECTION, SOLUTION 0.5 MG/.1ML
1 INJECTION, SOLUTION SUBCUTANEOUS ONCE
Qty: 0 | Refills: 0 | Status: DISCONTINUED | OUTPATIENT
Start: 2018-03-19 | End: 2018-03-23

## 2018-03-19 RX ORDER — SODIUM CHLORIDE 9 MG/ML
1000 INJECTION, SOLUTION INTRAVENOUS
Qty: 0 | Refills: 0 | Status: DISCONTINUED | OUTPATIENT
Start: 2018-03-19 | End: 2018-03-22

## 2018-03-19 RX ADMIN — ENOXAPARIN SODIUM 40 MILLIGRAM(S): 100 INJECTION SUBCUTANEOUS at 23:52

## 2018-03-19 RX ADMIN — PANTOPRAZOLE SODIUM 40 MILLIGRAM(S): 20 TABLET, DELAYED RELEASE ORAL at 21:00

## 2018-03-19 RX ADMIN — Medication 50 GRAM(S): at 20:13

## 2018-03-19 RX ADMIN — PROPOFOL 1 MICROGRAM(S)/KG/MIN: 10 INJECTION, EMULSION INTRAVENOUS at 19:06

## 2018-03-19 RX ADMIN — CHLORHEXIDINE GLUCONATE 15 MILLILITER(S): 213 SOLUTION TOPICAL at 20:38

## 2018-03-19 RX ADMIN — ENOXAPARIN SODIUM 40 MILLIGRAM(S): 100 INJECTION SUBCUTANEOUS at 12:19

## 2018-03-19 RX ADMIN — Medication 10 MILLIGRAM(S): at 23:57

## 2018-03-19 RX ADMIN — PROPOFOL 1 MICROGRAM(S)/KG/MIN: 10 INJECTION, EMULSION INTRAVENOUS at 21:21

## 2018-03-19 RX ADMIN — PROPOFOL 1 MICROGRAM(S)/KG/MIN: 10 INJECTION, EMULSION INTRAVENOUS at 17:17

## 2018-03-19 RX ADMIN — FENTANYL CITRATE 5 MICROGRAM(S)/KG/HR: 50 INJECTION INTRAVENOUS at 20:11

## 2018-03-19 RX ADMIN — Medication 100 MILLIEQUIVALENT(S): at 22:59

## 2018-03-19 RX ADMIN — SCOPALAMINE 1 PATCH: 1 PATCH, EXTENDED RELEASE TRANSDERMAL at 12:19

## 2018-03-19 RX ADMIN — Medication 100 MILLIEQUIVALENT(S): at 23:52

## 2018-03-19 RX ADMIN — Medication 44 MICROGRAM(S): at 23:52

## 2018-03-19 RX ADMIN — Medication 100 MILLIEQUIVALENT(S): at 20:59

## 2018-03-19 RX ADMIN — ONDANSETRON 4 MILLIGRAM(S): 8 TABLET, FILM COATED ORAL at 22:59

## 2018-03-19 NOTE — BRIEF OPERATIVE NOTE - PROCEDURE
<<-----Click on this checkbox to enter Procedure Laparoscopic sleeve gastrectomy  03/19/2018  robot assisted  Active  [facility]

## 2018-03-19 NOTE — H&P PST ADULT - HISTORY OF PRESENT ILLNESS
63yo F PMH asthma, HTN, gout, hypothyroid, super obesity (BMI 57.3kg/m2) with failure of medical management of her obesity now presents for elective gastric sleeve placement. 61yo F PMH asthma, HTN, gout, hypothyroid, super obesity (BMI 57.3) with failure of medical management of her obesity now presents for elective robot assisted sleeve gastrectomy.

## 2018-03-19 NOTE — BRIEF OPERATIVE NOTE - OPERATION/FINDINGS
Fatty liver; greater curve stapled with 36 Swazi bougie in place. Omentum reapproximated to staple line.

## 2018-03-19 NOTE — CONSULT NOTE ADULT - SUBJECTIVE AND OBJECTIVE BOX
HPI:  61yo F PMH asthma, HTN, gout, hypothyroid, super obesity (BMI 57.3) with failure of medical management of her obesity now presents for elective robot assisted sleeve gastrectomy. (19 Mar 2018 12:48)    Addendum: Pt underwent robotic laparoscopic sleeve gastrectomy. Pt initially extubated in OR and subsequently became cyanotic, hypoxemic and required re-intubation. Admitted to SICU intubated with plan for extubation in AM.    PAST MEDICAL & SURGICAL HISTORY:  SOB (shortness of breath): R/T inhalation of chemicals at work ( [employer] )  HSV (herpes simplex virus) infection  Asthma  Hypothyroid  Gout  HTN (hypertension)  Surgery, elective: right hand - 3 fingers repairs      ROS: See HPI    MEDICATIONS  (STANDING):  acetaminophen  IVPB. 1000 milliGRAM(s) IV Intermittent once  ALBUTerol    90 MICROgram(s) HFA Inhaler 2 Puff(s) Inhalation every 6 hours  amLODIPine   Tablet 10 milliGRAM(s) Oral daily  chlorhexidine 0.12% Liquid 15 milliLiter(s) Swish and Spit two times a day  dextrose 5%. 1000 milliLiter(s) (50 mL/Hr) IV Continuous <Continuous>  dextrose 50% Injectable 12.5 Gram(s) IV Push once  dextrose 50% Injectable 25 Gram(s) IV Push once  dextrose 50% Injectable 25 Gram(s) IV Push once  enoxaparin Injectable 40 milliGRAM(s) SubCutaneous every 12 hours  fentaNYL   Infusion 0.301 MICROgram(s)/kG/Hr (5 mL/Hr) IV Continuous <Continuous>  insulin lispro (HumaLOG) corrective regimen sliding scale   SubCutaneous Before meals and at bedtime  lactated ringers. 1000 milliLiter(s) (200 mL/Hr) IV Continuous <Continuous>  levothyroxine 88 MICROGram(s) Oral daily  levothyroxine Injectable 44 MICROGram(s) IV Push at bedtime  metoclopramide Injectable 10 milliGRAM(s) IV Push every 6 hours  ondansetron Injectable 4 milliGRAM(s) IV Push every 6 hours  pantoprazole    Tablet 40 milliGRAM(s) Oral before breakfast  pantoprazole  Injectable 40 milliGRAM(s) IV Push daily  propofol Infusion 1.004 MICROgram(s)/kG/Min (1 mL/Hr) IV Continuous <Continuous>    MEDICATIONS  (PRN):  dextrose Gel 1 Dose(s) Oral once PRN Blood Glucose LESS THAN 70 milliGRAM(s)/deciliter  glucagon  Injectable 1 milliGRAM(s) IntraMuscular once PRN Glucose LESS THAN 70 milligrams/deciliter  oxyCODONE    5 mG/acetaminophen 325 mG 1 Tablet(s) Oral every 4 hours PRN Moderate Pain (4 - 6)  oxyCODONE    5 mG/acetaminophen 325 mG 2 Tablet(s) Oral every 4 hours PRN Severe Pain (7 - 10)      Allergies    No Known Allergies    Intolerances        SOCIAL HISTORY:  Smoke: Never Smoker  EtOH: occasional    FAMILY HISTORY:      Vital Signs Last 24 Hrs  T(C): 36.1 (19 Mar 2018 17:10), Max: 36.6 (19 Mar 2018 11:22)  T(F): 96.9 (19 Mar 2018 17:10), Max: 97.9 (19 Mar 2018 11:22)  HR: 64 (19 Mar 2018 17:25) (64 - 74)  BP: 119/64 (19 Mar 2018 17:25) (107/55 - 150/77)  BP(mean): 83 (19 Mar 2018 17:25) (83 - 86)  RR: 16 (19 Mar 2018 17:25) (13 - 23)  SpO2: 100% (19 Mar 2018 17:25) (100% - 100%)    PHYSICAL EXAM    Gen: Intubated, sedated, no apparent distress  HEENT: PERRL, MMM, no masses  CV: RRR, no m/r/g  Pulm: intubated, on vent, CTABL, no wheezes or rhonchi   Abd: obese, soft, nontender, nondistended, incisions CDI  Ext: WWP, no edema  Skin: no rashes      LABS:                RADIOLOGY & ADDITIONAL STUDIES:    Assessment and Plan:  61yo F PMH asthma, HTN, gout, hypothyroid, super obesity (BMI 57.3) with failure of medical management of her obesity now presents for elective robot assisted sleeve gastrectomy.     Neuro: propofol gtt, fentanyl gtt  CV: normotensive goals  Pulm: AC chlorhex 490/40/12/5  GI: NPO, Protonix, Zofran PRN  : Dennis   ID:None  Endo: ISS, Synthroid  PPx: SCDs  Lines: PIVs  Wounds: no drains   PT/OT: Not ordered HPI:  61yo F PMH asthma, HTN, gout, hypothyroid, super obesity (BMI 57.3) with failure of medical management of her obesity now presents for elective robot assisted sleeve gastrectomy. (19 Mar 2018 12:48)    Addendum: Pt underwent robotic laparoscopic sleeve gastrectomy. Pt initially extubated in OR and subsequently became cyanotic, hypoxemic and required re-intubation. Admitted to SICU intubated with plan for extubation in AM.    PAST MEDICAL & SURGICAL HISTORY:  SOB (shortness of breath): R/T inhalation of chemicals at work ( [employer] )  HSV (herpes simplex virus) infection  Asthma  Hypothyroid  Gout  HTN (hypertension)  Surgery, elective: right hand - 3 fingers repairs      ROS: See HPI    MEDICATIONS  (STANDING):  acetaminophen  IVPB. 1000 milliGRAM(s) IV Intermittent once  ALBUTerol    90 MICROgram(s) HFA Inhaler 2 Puff(s) Inhalation every 6 hours  amLODIPine   Tablet 10 milliGRAM(s) Oral daily  chlorhexidine 0.12% Liquid 15 milliLiter(s) Swish and Spit two times a day  dextrose 5%. 1000 milliLiter(s) (50 mL/Hr) IV Continuous <Continuous>  dextrose 50% Injectable 12.5 Gram(s) IV Push once  dextrose 50% Injectable 25 Gram(s) IV Push once  dextrose 50% Injectable 25 Gram(s) IV Push once  enoxaparin Injectable 40 milliGRAM(s) SubCutaneous every 12 hours  fentaNYL   Infusion 0.301 MICROgram(s)/kG/Hr (5 mL/Hr) IV Continuous <Continuous>  insulin lispro (HumaLOG) corrective regimen sliding scale   SubCutaneous Before meals and at bedtime  lactated ringers. 1000 milliLiter(s) (200 mL/Hr) IV Continuous <Continuous>  levothyroxine 88 MICROGram(s) Oral daily  levothyroxine Injectable 44 MICROGram(s) IV Push at bedtime  metoclopramide Injectable 10 milliGRAM(s) IV Push every 6 hours  ondansetron Injectable 4 milliGRAM(s) IV Push every 6 hours  pantoprazole    Tablet 40 milliGRAM(s) Oral before breakfast  pantoprazole  Injectable 40 milliGRAM(s) IV Push daily  propofol Infusion 1.004 MICROgram(s)/kG/Min (1 mL/Hr) IV Continuous <Continuous>    MEDICATIONS  (PRN):  dextrose Gel 1 Dose(s) Oral once PRN Blood Glucose LESS THAN 70 milliGRAM(s)/deciliter  glucagon  Injectable 1 milliGRAM(s) IntraMuscular once PRN Glucose LESS THAN 70 milligrams/deciliter  oxyCODONE    5 mG/acetaminophen 325 mG 1 Tablet(s) Oral every 4 hours PRN Moderate Pain (4 - 6)  oxyCODONE    5 mG/acetaminophen 325 mG 2 Tablet(s) Oral every 4 hours PRN Severe Pain (7 - 10)      Allergies    No Known Allergies    Intolerances        SOCIAL HISTORY:  Smoke: Never Smoker  EtOH: occasional    FAMILY HISTORY:      Vital Signs Last 24 Hrs  T(C): 36.1 (19 Mar 2018 17:10), Max: 36.6 (19 Mar 2018 11:22)  T(F): 96.9 (19 Mar 2018 17:10), Max: 97.9 (19 Mar 2018 11:22)  HR: 64 (19 Mar 2018 17:25) (64 - 74)  BP: 119/64 (19 Mar 2018 17:25) (107/55 - 150/77)  BP(mean): 83 (19 Mar 2018 17:25) (83 - 86)  RR: 16 (19 Mar 2018 17:25) (13 - 23)  SpO2: 100% (19 Mar 2018 17:25) (100% - 100%)    PHYSICAL EXAM    Gen: Intubated, sedated, no apparent distress  HEENT: PERRL, MMM, no masses  CV: RRR, no m/r/g  Pulm: intubated, on vent, CTABL, no wheezes or rhonchi   Abd: obese, soft, nontender, nondistended, incisions CDI  Ext: WWP, no edema  Skin: no rashes      LABS:                RADIOLOGY & ADDITIONAL STUDIES:    Assessment and Plan:  61yo F PMH asthma, HTN, gout, hypothyroid, super obesity (BMI 57.3) with failure of medical management of her obesity now presents for elective robot assisted sleeve gastrectomy.     Neuro: propofol gtt, fentanyl gtt  CV: normotensive goals  Pulm: AC chlorhex 490/40/12/5  GI: NPO, Protonix, Zofran PRN  : Dennis   ID:None  Endo: ISS, Synthroid  PPx: SCDs, lovenox  Lines: PIVs  Wounds: no drains   PT/OT: Not ordered

## 2018-03-19 NOTE — H&P PST ADULT - PROBLEM SELECTOR PLAN 1
to OR for weight loss surgery   post op admission for monitoring   CLD as tolerated   Voiding  pain control PRN   SQH/SCDs/OOB/A  continue home omeprazole 40mg

## 2018-03-19 NOTE — H&P PST ADULT - ASSESSMENT
63yo F PMH asthma, HTN, gout, hypothyroid, super obesity (BMI 57.3kg/m2) with failure of medical management of her obesity now presents for elective robotic sleeve gastrectomy  . 62F w/asthma, HTN, gout, hypothyroid, super obesity (BMI 57.3) refractory to medical management now presents for an elective robotic assisted sleeve gastrectomy  .

## 2018-03-20 PROBLEM — B00.9 HERPESVIRAL INFECTION, UNSPECIFIED: Chronic | Status: ACTIVE | Noted: 2018-03-16

## 2018-03-20 PROBLEM — R06.02 SHORTNESS OF BREATH: Chronic | Status: ACTIVE | Noted: 2018-03-16

## 2018-03-20 PROBLEM — M10.9 GOUT, UNSPECIFIED: Chronic | Status: ACTIVE | Noted: 2018-03-16

## 2018-03-20 PROBLEM — I10 ESSENTIAL (PRIMARY) HYPERTENSION: Chronic | Status: ACTIVE | Noted: 2018-03-16

## 2018-03-20 PROBLEM — J45.909 UNSPECIFIED ASTHMA, UNCOMPLICATED: Chronic | Status: ACTIVE | Noted: 2018-03-16

## 2018-03-20 PROBLEM — E03.9 HYPOTHYROIDISM, UNSPECIFIED: Chronic | Status: ACTIVE | Noted: 2018-03-16

## 2018-03-20 LAB
ANION GAP SERPL CALC-SCNC: 13 MMOL/L — SIGNIFICANT CHANGE UP (ref 5–17)
BUN SERPL-MCNC: 12 MG/DL — SIGNIFICANT CHANGE UP (ref 7–23)
CALCIUM SERPL-MCNC: 9.2 MG/DL — SIGNIFICANT CHANGE UP (ref 8.4–10.5)
CHLORIDE SERPL-SCNC: 99 MMOL/L — SIGNIFICANT CHANGE UP (ref 96–108)
CO2 SERPL-SCNC: 24 MMOL/L — SIGNIFICANT CHANGE UP (ref 22–31)
CREAT SERPL-MCNC: 1.03 MG/DL — SIGNIFICANT CHANGE UP (ref 0.5–1.3)
GLUCOSE BLDC GLUCOMTR-MCNC: 111 MG/DL — HIGH (ref 70–99)
GLUCOSE BLDC GLUCOMTR-MCNC: 113 MG/DL — HIGH (ref 70–99)
GLUCOSE BLDC GLUCOMTR-MCNC: 113 MG/DL — HIGH (ref 70–99)
GLUCOSE BLDC GLUCOMTR-MCNC: 99 MG/DL — SIGNIFICANT CHANGE UP (ref 70–99)
GLUCOSE SERPL-MCNC: 108 MG/DL — HIGH (ref 70–99)
HCT VFR BLD CALC: 28 % — LOW (ref 34.5–45)
HGB BLD-MCNC: 9.1 G/DL — LOW (ref 11.5–15.5)
MAGNESIUM SERPL-MCNC: 1.8 MG/DL — SIGNIFICANT CHANGE UP (ref 1.6–2.6)
MCHC RBC-ENTMCNC: 31.1 PG — SIGNIFICANT CHANGE UP (ref 27–34)
MCHC RBC-ENTMCNC: 32.5 G/DL — SIGNIFICANT CHANGE UP (ref 32–36)
MCV RBC AUTO: 95.6 FL — SIGNIFICANT CHANGE UP (ref 80–100)
PHOSPHATE SERPL-MCNC: 2.5 MG/DL — SIGNIFICANT CHANGE UP (ref 2.5–4.5)
PLATELET # BLD AUTO: 224 K/UL — SIGNIFICANT CHANGE UP (ref 150–400)
POTASSIUM SERPL-MCNC: 4 MMOL/L — SIGNIFICANT CHANGE UP (ref 3.5–5.3)
POTASSIUM SERPL-SCNC: 4 MMOL/L — SIGNIFICANT CHANGE UP (ref 3.5–5.3)
RBC # BLD: 2.93 M/UL — LOW (ref 3.8–5.2)
RBC # FLD: 13.8 % — SIGNIFICANT CHANGE UP (ref 10.3–16.9)
SODIUM SERPL-SCNC: 136 MMOL/L — SIGNIFICANT CHANGE UP (ref 135–145)
WBC # BLD: 8 K/UL — SIGNIFICANT CHANGE UP (ref 3.8–10.5)
WBC # FLD AUTO: 8 K/UL — SIGNIFICANT CHANGE UP (ref 3.8–10.5)

## 2018-03-20 PROCEDURE — 71045 X-RAY EXAM CHEST 1 VIEW: CPT | Mod: 26

## 2018-03-20 PROCEDURE — 74241: CPT | Mod: 26

## 2018-03-20 RX ORDER — ONDANSETRON 8 MG/1
4 TABLET, FILM COATED ORAL EVERY 4 HOURS
Qty: 0 | Refills: 0 | Status: DISCONTINUED | OUTPATIENT
Start: 2018-03-20 | End: 2018-03-23

## 2018-03-20 RX ORDER — MAGNESIUM SULFATE 500 MG/ML
1 VIAL (ML) INJECTION ONCE
Qty: 0 | Refills: 0 | Status: COMPLETED | OUTPATIENT
Start: 2018-03-20 | End: 2018-03-20

## 2018-03-20 RX ORDER — HYDROMORPHONE HYDROCHLORIDE 2 MG/ML
0.5 INJECTION INTRAMUSCULAR; INTRAVENOUS; SUBCUTANEOUS EVERY 4 HOURS
Qty: 0 | Refills: 0 | Status: DISCONTINUED | OUTPATIENT
Start: 2018-03-20 | End: 2018-03-22

## 2018-03-20 RX ORDER — HYDRALAZINE HCL 50 MG
10 TABLET ORAL ONCE
Qty: 0 | Refills: 0 | Status: COMPLETED | OUTPATIENT
Start: 2018-03-20 | End: 2018-03-20

## 2018-03-20 RX ORDER — HYDROMORPHONE HYDROCHLORIDE 2 MG/ML
1 INJECTION INTRAMUSCULAR; INTRAVENOUS; SUBCUTANEOUS EVERY 4 HOURS
Qty: 0 | Refills: 0 | Status: DISCONTINUED | OUTPATIENT
Start: 2018-03-20 | End: 2018-03-22

## 2018-03-20 RX ORDER — METOCLOPRAMIDE HCL 10 MG
10 TABLET ORAL EVERY 6 HOURS
Qty: 0 | Refills: 0 | Status: DISCONTINUED | OUTPATIENT
Start: 2018-03-20 | End: 2018-03-23

## 2018-03-20 RX ORDER — ALBUTEROL 90 UG/1
2 AEROSOL, METERED ORAL EVERY 6 HOURS
Qty: 0 | Refills: 0 | Status: DISCONTINUED | OUTPATIENT
Start: 2018-03-20 | End: 2018-03-23

## 2018-03-20 RX ORDER — LABETALOL HCL 100 MG
10 TABLET ORAL ONCE
Qty: 0 | Refills: 0 | Status: COMPLETED | OUTPATIENT
Start: 2018-03-20 | End: 2018-03-20

## 2018-03-20 RX ADMIN — Medication 44 MICROGRAM(S): at 23:37

## 2018-03-20 RX ADMIN — ALBUTEROL 2 PUFF(S): 90 AEROSOL, METERED ORAL at 20:10

## 2018-03-20 RX ADMIN — ENOXAPARIN SODIUM 40 MILLIGRAM(S): 100 INJECTION SUBCUTANEOUS at 09:40

## 2018-03-20 RX ADMIN — Medication 10 MILLIGRAM(S): at 11:22

## 2018-03-20 RX ADMIN — Medication 100 GRAM(S): at 08:29

## 2018-03-20 RX ADMIN — CHLORHEXIDINE GLUCONATE 15 MILLILITER(S): 213 SOLUTION TOPICAL at 06:58

## 2018-03-20 RX ADMIN — AMLODIPINE BESYLATE 10 MILLIGRAM(S): 2.5 TABLET ORAL at 09:40

## 2018-03-20 RX ADMIN — Medication 10 MILLIGRAM(S): at 00:05

## 2018-03-20 RX ADMIN — PANTOPRAZOLE SODIUM 40 MILLIGRAM(S): 20 TABLET, DELAYED RELEASE ORAL at 11:22

## 2018-03-20 RX ADMIN — Medication 10 MILLIGRAM(S): at 14:53

## 2018-03-20 RX ADMIN — HYDROMORPHONE HYDROCHLORIDE 0.5 MILLIGRAM(S): 2 INJECTION INTRAMUSCULAR; INTRAVENOUS; SUBCUTANEOUS at 23:06

## 2018-03-20 RX ADMIN — HYDROMORPHONE HYDROCHLORIDE 0.5 MILLIGRAM(S): 2 INJECTION INTRAMUSCULAR; INTRAVENOUS; SUBCUTANEOUS at 22:38

## 2018-03-20 RX ADMIN — Medication 400 MILLIGRAM(S): at 16:12

## 2018-03-20 RX ADMIN — Medication 10 MILLIGRAM(S): at 07:16

## 2018-03-20 RX ADMIN — ENOXAPARIN SODIUM 40 MILLIGRAM(S): 100 INJECTION SUBCUTANEOUS at 23:37

## 2018-03-20 NOTE — PROGRESS NOTE ADULT - SUBJECTIVE AND OBJECTIVE BOX
STATUS POST:  laparoscopic sleeve gastrectomy  POD# 1     SUBJECTIVE: Patient feeling well, pain well controlled. Extubated, satting well on supplemental O2.     amLODIPine   Tablet 10 milliGRAM(s) Oral daily  enoxaparin Injectable 40 milliGRAM(s) SubCutaneous every 12 hours      Vital Signs Last 24 Hrs  T(C): 36.6 (20 Mar 2018 09:16), Max: 36.6 (19 Mar 2018 11:22)  T(F): 97.9 (20 Mar 2018 09:16), Max: 97.9 (19 Mar 2018 11:22)  HR: 72 (20 Mar 2018 10:00) (56 - 76)  BP: 172/70 (20 Mar 2018 10:00) (107/55 - 172/86)  BP(mean): 93 (20 Mar 2018 10:00) (82 - 119)  RR: 17 (20 Mar 2018 10:00) (11 - 23)  SpO2: 100% (20 Mar 2018 10:00) (99% - 100%)  I&O's Detail    19 Mar 2018 07:01  -  20 Mar 2018 07:00  --------------------------------------------------------  IN:    fentaNYL  Infusion: 167.6 mL    IV PiggyBack: 400 mL    lactated ringers.: 2600 mL    propofol Infusion: 715.9 mL  Total IN: 3883.5 mL    OUT:    Indwelling Catheter - Urethral: 1110 mL  Total OUT: 1110 mL    Total NET: 2773.5 mL      20 Mar 2018 07:01  -  20 Mar 2018 10:55  --------------------------------------------------------  IN:    fentaNYL  Infusion: 5 mL    lactated ringers.: 600 mL  Total IN: 605 mL    OUT:    Indwelling Catheter - Urethral: 400 mL  Total OUT: 400 mL    Total NET: 205 mL          General: NAD, resting comfortably in bed  C/V: NSR  Pulm: Nonlabored breathing, no respiratory distress, on supplemental o2  Abd: obese, soft, nondisteded, nontender, incisions CDI  Extrem: WWP, SCDs in place        LABS:                        9.1    8.0   )-----------( 224      ( 20 Mar 2018 06:28 )             28.0     03-20    136  |  99  |  12  ----------------------------<  108<H>  4.0   |  24  |  1.03    Ca    9.2      20 Mar 2018 06:28  Phos  2.5     03-20  Mg     1.8     03-20            RADIOLOGY & ADDITIONAL STUDIES:    CXR 3/20  Impression: Possible bilateral perihilar prominence/infiltrates. Again,   recommend CT scan of chest.

## 2018-03-20 NOTE — PROGRESS NOTE ADULT - ASSESSMENT
61yo F PMH asthma, HTN, gout, hypothyroid, super obesity (BMI 57.3) with failure of medical management of her obesity now presents for elective robot assisted sleeve gastrectomy.     Neuro: dilaudid prn  CV: normotensive goals Norvasc  Pulm: extubated, duonebs  GI: NPO, Protonix, Zofran PRN  : Dennis   ID:None  Endo: ISS, Synthroid  PPx: SCDs, lovenox  Lines: PIVs  Wounds: no drains   PT/OT: Not ordered

## 2018-03-20 NOTE — DIETITIAN INITIAL EVALUATION ADULT. - ENERGY NEEDS
Height: 5'7" Weight: 366lbs, IBW 135lbs+/-10%, %%, BMI 57  IBW used for calculations as pt >120% of IBW   Above energy needs calculated for wt maintenance (20-25kcal/kg).   Weeks 1-2 estimated needs: 612-734kcal/day (10-12kcal/kg), 92-129g pro/day (1.5-2.1g/kg), >/=64oz clear fluids.

## 2018-03-20 NOTE — DIETITIAN INITIAL EVALUATION ADULT. - OTHER INFO
61yo F s/p lap sleeve gastrectomy POD1. Pt was initially extubated in OR and subsequently became cyanotic, hypoxemic and required reintubation. Pt seen resting in bed, extubated & on supplemental O2. Pt NPo at time of assesment, unable to assess PO tolerance-- diet now advanced to BARICLLIQ. Denies N/V. C/o mild gas pains. States her Son is getting her protein supplements. RD provided indepth edu on diet advancement process and specific nutrient needs s/p LSG. pt was receptive and expressed understanding. NKFA or dietary restrictions. Skin: surgical incision; GI WDL per flowsheet.

## 2018-03-21 LAB
ANION GAP SERPL CALC-SCNC: 14 MMOL/L — SIGNIFICANT CHANGE UP (ref 5–17)
BUN SERPL-MCNC: 8 MG/DL — SIGNIFICANT CHANGE UP (ref 7–23)
CALCIUM SERPL-MCNC: 9 MG/DL — SIGNIFICANT CHANGE UP (ref 8.4–10.5)
CHLORIDE SERPL-SCNC: 101 MMOL/L — SIGNIFICANT CHANGE UP (ref 96–108)
CO2 SERPL-SCNC: 26 MMOL/L — SIGNIFICANT CHANGE UP (ref 22–31)
CREAT SERPL-MCNC: 0.9 MG/DL — SIGNIFICANT CHANGE UP (ref 0.5–1.3)
GLUCOSE BLDC GLUCOMTR-MCNC: 102 MG/DL — HIGH (ref 70–99)
GLUCOSE BLDC GLUCOMTR-MCNC: 84 MG/DL — SIGNIFICANT CHANGE UP (ref 70–99)
GLUCOSE BLDC GLUCOMTR-MCNC: 88 MG/DL — SIGNIFICANT CHANGE UP (ref 70–99)
GLUCOSE BLDC GLUCOMTR-MCNC: 99 MG/DL — SIGNIFICANT CHANGE UP (ref 70–99)
GLUCOSE SERPL-MCNC: 109 MG/DL — HIGH (ref 70–99)
HCT VFR BLD CALC: 29.6 % — LOW (ref 34.5–45)
HGB BLD-MCNC: 9.1 G/DL — LOW (ref 11.5–15.5)
MAGNESIUM SERPL-MCNC: 1.9 MG/DL — SIGNIFICANT CHANGE UP (ref 1.6–2.6)
MCHC RBC-ENTMCNC: 30.1 PG — SIGNIFICANT CHANGE UP (ref 27–34)
MCHC RBC-ENTMCNC: 30.7 G/DL — LOW (ref 32–36)
MCV RBC AUTO: 98 FL — SIGNIFICANT CHANGE UP (ref 80–100)
PHOSPHATE SERPL-MCNC: 3.6 MG/DL — SIGNIFICANT CHANGE UP (ref 2.5–4.5)
PLATELET # BLD AUTO: 222 K/UL — SIGNIFICANT CHANGE UP (ref 150–400)
POTASSIUM SERPL-MCNC: 3.9 MMOL/L — SIGNIFICANT CHANGE UP (ref 3.5–5.3)
POTASSIUM SERPL-SCNC: 3.9 MMOL/L — SIGNIFICANT CHANGE UP (ref 3.5–5.3)
RBC # BLD: 3.02 M/UL — LOW (ref 3.8–5.2)
RBC # FLD: 14 % — SIGNIFICANT CHANGE UP (ref 10.3–16.9)
SODIUM SERPL-SCNC: 141 MMOL/L — SIGNIFICANT CHANGE UP (ref 135–145)
WBC # BLD: 6.8 K/UL — SIGNIFICANT CHANGE UP (ref 3.8–10.5)
WBC # FLD AUTO: 6.8 K/UL — SIGNIFICANT CHANGE UP (ref 3.8–10.5)

## 2018-03-21 RX ADMIN — HYDROMORPHONE HYDROCHLORIDE 1 MILLIGRAM(S): 2 INJECTION INTRAMUSCULAR; INTRAVENOUS; SUBCUTANEOUS at 09:20

## 2018-03-21 RX ADMIN — ENOXAPARIN SODIUM 40 MILLIGRAM(S): 100 INJECTION SUBCUTANEOUS at 21:14

## 2018-03-21 RX ADMIN — Medication 44 MICROGRAM(S): at 21:14

## 2018-03-21 RX ADMIN — PANTOPRAZOLE SODIUM 40 MILLIGRAM(S): 20 TABLET, DELAYED RELEASE ORAL at 09:05

## 2018-03-21 RX ADMIN — HYDROMORPHONE HYDROCHLORIDE 1 MILLIGRAM(S): 2 INJECTION INTRAMUSCULAR; INTRAVENOUS; SUBCUTANEOUS at 09:03

## 2018-03-21 RX ADMIN — AMLODIPINE BESYLATE 10 MILLIGRAM(S): 2.5 TABLET ORAL at 06:13

## 2018-03-21 RX ADMIN — ALBUTEROL 2 PUFF(S): 90 AEROSOL, METERED ORAL at 06:50

## 2018-03-21 RX ADMIN — ENOXAPARIN SODIUM 40 MILLIGRAM(S): 100 INJECTION SUBCUTANEOUS at 09:04

## 2018-03-21 RX ADMIN — ONDANSETRON 4 MILLIGRAM(S): 8 TABLET, FILM COATED ORAL at 09:04

## 2018-03-21 NOTE — PROGRESS NOTE ADULT - ASSESSMENT
61yo F PMH asthma, HTN, gout, hypothyroid, super obesity (BMI 57.3) with failure of medical management of her obesity now presents for elective robot assisted sleeve gastrectomy.     Neuro: dilaudid prn  CV: normotensive goals Norvasc  Pulm: extubated, duonebs  GI: NPO, Protonix, Zofran PRN  : Dennis   ID:None  Endo: ISS, Synthroid  PPx: SCDs, lovenox  Lines: PIVs  Wounds: no drains   PT/OT: Not ordered 61yo F PMH asthma, HTN, gout, hypothyroid, super obesity (BMI 57.3) with failure of medical management of her obesity now presents for elective robot assisted sleeve gastrectomy.     BCLD  Dilaudid for pain control  IVD  Norvasc for HTN  Potonix, post operative acid reflux  Zofran PRN for nausea   ISS   Synthroid  OOB/AMB Lovenox

## 2018-03-21 NOTE — PROGRESS NOTE ADULT - SUBJECTIVE AND OBJECTIVE BOX
O/N: pain controlled. NORMA PINEDA  3/20: extubated, UGI  WNL, pain well controlled ,  to remain NPO per Dr. Smith OVERNIGHT EVENTS: pain controlled. NORMA PINEDA  3/20: extubated, UGI  WNL, pain well controlled ,  to remain NPO per Dr. Smith        STATUS POST:  Laparoscopic sleeve gastrectomy    POST OPERATIVE DAY #: 2    SUBJECTIVE: Patient examined bedside complains of incisional pain  Flatus: [] YES [X] NO             Bowel Movement: [ ] YES [X ] NO  Pain (0-10):            Pain Control Adequate: [ X] YES [ ] NO  Nausea: [ ] YES [ X] NO            Vomiting: [ ] YES [X ] NO  Diarrhea: [ ] YES [ X] NO         Constipation: [ ] YES [ X] NO     Chest Pain: [ ] YES [ X] NO    SOB:  [ ] YES [X ] NO    amLODIPine   Tablet 10 milliGRAM(s) Oral daily  enoxaparin Injectable 40 milliGRAM(s) SubCutaneous every 12 hours      Vital Signs Last 24 Hrs  T(C): 36.9 (21 Mar 2018 09:56), Max: 36.9 (21 Mar 2018 00:41)  T(F): 98.4 (21 Mar 2018 09:56), Max: 98.5 (21 Mar 2018 05:15)  HR: 74 (21 Mar 2018 09:56) (63 - 74)  BP: 133/61 (21 Mar 2018 09:56) (122/62 - 168/76)  BP(mean): 88 (20 Mar 2018 16:00) (88 - 111)  RR: 16 (21 Mar 2018 09:56) (14 - 20)  SpO2: 100% (21 Mar 2018 09:56) (99% - 100%)  I&O's Detail    20 Mar 2018 07:01  -  21 Mar 2018 07:00  --------------------------------------------------------  IN:    fentaNYL  Infusion: 5 mL    lactated ringers.: 4000 mL  Total IN: 4005 mL    OUT:    Indwelling Catheter - Urethral: 2295 mL  Total OUT: 2295 mL    Total NET: 1710 mL      21 Mar 2018 07:01  -  21 Mar 2018 13:25  --------------------------------------------------------  IN:  Total IN: 0 mL    OUT:    Indwelling Catheter - Urethral: 400 mL  Total OUT: 400 mL    Total NET: -400 mL          General: NAD, resting comfortably in bed  C/V: NSR  Pulm: Nonlabored breathing, no respiratory distress  Abd: Soft, non-distended, TTP around incision site, incision clean dry and intact  Extrem: WWP, no edema, SCDs in place        LABS:                        9.1    6.8   )-----------( 222      ( 21 Mar 2018 07:31 )             29.6     03-21    141  |  101  |  8   ----------------------------<  109<H>  3.9   |  26  |  0.90    Ca    9.0      21 Mar 2018 07:30  Phos  3.6     03-21  Mg     1.9     03-21

## 2018-03-22 LAB
ANION GAP SERPL CALC-SCNC: 11 MMOL/L — SIGNIFICANT CHANGE UP (ref 5–17)
BUN SERPL-MCNC: 8 MG/DL — SIGNIFICANT CHANGE UP (ref 7–23)
CALCIUM SERPL-MCNC: 8.8 MG/DL — SIGNIFICANT CHANGE UP (ref 8.4–10.5)
CHLORIDE SERPL-SCNC: 99 MMOL/L — SIGNIFICANT CHANGE UP (ref 96–108)
CO2 SERPL-SCNC: 27 MMOL/L — SIGNIFICANT CHANGE UP (ref 22–31)
CREAT SERPL-MCNC: 0.85 MG/DL — SIGNIFICANT CHANGE UP (ref 0.5–1.3)
GLUCOSE BLDC GLUCOMTR-MCNC: 84 MG/DL — SIGNIFICANT CHANGE UP (ref 70–99)
GLUCOSE BLDC GLUCOMTR-MCNC: 85 MG/DL — SIGNIFICANT CHANGE UP (ref 70–99)
GLUCOSE BLDC GLUCOMTR-MCNC: 91 MG/DL — SIGNIFICANT CHANGE UP (ref 70–99)
GLUCOSE BLDC GLUCOMTR-MCNC: 93 MG/DL — SIGNIFICANT CHANGE UP (ref 70–99)
GLUCOSE SERPL-MCNC: 105 MG/DL — HIGH (ref 70–99)
HCT VFR BLD CALC: 26.6 % — LOW (ref 34.5–45)
HGB BLD-MCNC: 8.3 G/DL — LOW (ref 11.5–15.5)
MAGNESIUM SERPL-MCNC: 1.6 MG/DL — SIGNIFICANT CHANGE UP (ref 1.6–2.6)
MCHC RBC-ENTMCNC: 30.7 PG — SIGNIFICANT CHANGE UP (ref 27–34)
MCHC RBC-ENTMCNC: 31.2 G/DL — LOW (ref 32–36)
MCV RBC AUTO: 98.5 FL — SIGNIFICANT CHANGE UP (ref 80–100)
PHOSPHATE SERPL-MCNC: 3.3 MG/DL — SIGNIFICANT CHANGE UP (ref 2.5–4.5)
PLATELET # BLD AUTO: 213 K/UL — SIGNIFICANT CHANGE UP (ref 150–400)
POTASSIUM SERPL-MCNC: 3.8 MMOL/L — SIGNIFICANT CHANGE UP (ref 3.5–5.3)
POTASSIUM SERPL-SCNC: 3.8 MMOL/L — SIGNIFICANT CHANGE UP (ref 3.5–5.3)
RBC # BLD: 2.7 M/UL — LOW (ref 3.8–5.2)
RBC # FLD: 14.1 % — SIGNIFICANT CHANGE UP (ref 10.3–16.9)
SODIUM SERPL-SCNC: 137 MMOL/L — SIGNIFICANT CHANGE UP (ref 135–145)
WBC # BLD: 6.5 K/UL — SIGNIFICANT CHANGE UP (ref 3.8–10.5)
WBC # FLD AUTO: 6.5 K/UL — SIGNIFICANT CHANGE UP (ref 3.8–10.5)

## 2018-03-22 RX ORDER — OXYCODONE AND ACETAMINOPHEN 5; 325 MG/1; MG/1
1 TABLET ORAL EVERY 4 HOURS
Qty: 0 | Refills: 0 | Status: DISCONTINUED | OUTPATIENT
Start: 2018-03-22 | End: 2018-03-23

## 2018-03-22 RX ORDER — SODIUM CHLORIDE 9 MG/ML
1000 INJECTION, SOLUTION INTRAVENOUS
Qty: 0 | Refills: 0 | Status: DISCONTINUED | OUTPATIENT
Start: 2018-03-22 | End: 2018-03-23

## 2018-03-22 RX ORDER — ACETAMINOPHEN 500 MG
1000 TABLET ORAL ONCE
Qty: 0 | Refills: 0 | Status: DISCONTINUED | OUTPATIENT
Start: 2018-03-22 | End: 2018-03-23

## 2018-03-22 RX ADMIN — HYDROMORPHONE HYDROCHLORIDE 0.5 MILLIGRAM(S): 2 INJECTION INTRAMUSCULAR; INTRAVENOUS; SUBCUTANEOUS at 07:00

## 2018-03-22 RX ADMIN — AMLODIPINE BESYLATE 10 MILLIGRAM(S): 2.5 TABLET ORAL at 06:45

## 2018-03-22 RX ADMIN — PANTOPRAZOLE SODIUM 40 MILLIGRAM(S): 20 TABLET, DELAYED RELEASE ORAL at 13:46

## 2018-03-22 RX ADMIN — HYDROMORPHONE HYDROCHLORIDE 0.5 MILLIGRAM(S): 2 INJECTION INTRAMUSCULAR; INTRAVENOUS; SUBCUTANEOUS at 01:20

## 2018-03-22 RX ADMIN — ENOXAPARIN SODIUM 40 MILLIGRAM(S): 100 INJECTION SUBCUTANEOUS at 22:40

## 2018-03-22 RX ADMIN — OXYCODONE AND ACETAMINOPHEN 1 TABLET(S): 5; 325 TABLET ORAL at 22:37

## 2018-03-22 RX ADMIN — Medication 44 MICROGRAM(S): at 22:50

## 2018-03-22 RX ADMIN — SCOPALAMINE 1 PATCH: 1 PATCH, EXTENDED RELEASE TRANSDERMAL at 12:38

## 2018-03-22 RX ADMIN — HYDROMORPHONE HYDROCHLORIDE 0.5 MILLIGRAM(S): 2 INJECTION INTRAMUSCULAR; INTRAVENOUS; SUBCUTANEOUS at 06:44

## 2018-03-22 RX ADMIN — HYDROMORPHONE HYDROCHLORIDE 0.5 MILLIGRAM(S): 2 INJECTION INTRAMUSCULAR; INTRAVENOUS; SUBCUTANEOUS at 01:45

## 2018-03-22 RX ADMIN — OXYCODONE AND ACETAMINOPHEN 1 TABLET(S): 5; 325 TABLET ORAL at 23:04

## 2018-03-22 RX ADMIN — ENOXAPARIN SODIUM 40 MILLIGRAM(S): 100 INJECTION SUBCUTANEOUS at 10:22

## 2018-03-22 NOTE — PHYSICAL THERAPY INITIAL EVALUATION ADULT - PERTINENT HX OF CURRENT PROBLEM, REHAB EVAL
63yo F PMH asthma, HTN, gout, hypothyroid, super obesity (BMI 57.3) with failure of medical management of her obesity now presents for elective robot assisted sleeve gastrectomy.

## 2018-03-22 NOTE — PROGRESS NOTE ADULT - SUBJECTIVE AND OBJECTIVE BOX
O/N: +TOV, EDWIN  3/21: Patient started on  BCLD tolerating , vann removed ,  VSS     POD 3 laparoscopic sleeve gastrectomy OVERNIGHT EVENTS: +TOV, EDWIN  3/21: Patient started on  BCLD tolerating , vann removed ,  VSS     POD 3 laparoscopic sleeve gastrectomy       SUBJECTIVE: Patient examined bedside complains of incisional pain    Flatus: [] YES [X] NO             Bowel Movement: [ ] YES [ X] NO  Pain (0-10):            Pain Control Adequate: [X ] YES [ ] NO  Nausea: [ ] YES [X ] NO            Vomiting: [ ] YES [X ] NO  Diarrhea: [ ] YES [X ] NO         Constipation: [ ] YES [ X] NO     Chest Pain: [ ] YES [X ] NO    SOB:  [ ] YES [X ] NO    amLODIPine   Tablet 10 milliGRAM(s) Oral daily  enoxaparin Injectable 40 milliGRAM(s) SubCutaneous every 12 hours      Vital Signs Last 24 Hrs  T(C): 36.7 (22 Mar 2018 05:13), Max: 37 (21 Mar 2018 17:31)  T(F): 98 (22 Mar 2018 05:13), Max: 98.6 (21 Mar 2018 17:31)  HR: 83 (22 Mar 2018 05:13) (74 - 83)  BP: 138/65 (22 Mar 2018 05:13) (123/59 - 150/82)  BP(mean): --  RR: 17 (22 Mar 2018 05:13) (16 - 17)  SpO2: 94% (22 Mar 2018 05:13) (94% - 100%)  I&O's Detail    21 Mar 2018 07:01  -  22 Mar 2018 07:00  --------------------------------------------------------  IN:    IV PiggyBack: 44 mL    lactated ringers.: 3800 mL    Oral Fluid: 320 mL  Total IN: 4164 mL    OUT:    Indwelling Catheter - Urethral: 600 mL    Voided: 1000 mL  Total OUT: 1600 mL    Total NET: 2564 mL          General: NAD, resting comfortably in bed  C/V: NSR  Pulm: Nonlabored breathing, no respiratory distress  Abd: Soft, non-distended, TTP around incision site, incision clean dry and intact  Extrem: WWP, no edema, SCDs in place      LABS:                        8.3    6.5   )-----------( 213      ( 22 Mar 2018 07:21 )             26.6     03-22    137  |  99  |  8   ----------------------------<  105<H>  3.8   |  27  |  0.85    Ca    8.8      22 Mar 2018 07:21  Phos  3.3     03-22  Mg     1.6     03-22

## 2018-03-22 NOTE — PHYSICAL THERAPY INITIAL EVALUATION ADULT - CRITERIA FOR SKILLED THERAPEUTIC INTERVENTIONS
impairments found/functional limitations in following categories/therapy frequency/rehab potential/anticipated discharge recommendation

## 2018-03-22 NOTE — PROGRESS NOTE ADULT - ASSESSMENT
61yo F PMH asthma, HTN, gout, hypothyroid, super obesity (BMI 57.3) with failure of medical management of her obesity now presents for elective robot assisted sleeve gastrectomy.     Neuro: dilaudid prn  CV: normotensive goals Norvasc  Pulm: extubated, duonebs  GI: BCLD, Protonix, Zofran PRN  : Dennis   ID:None  Endo: ISS, Synthroid  PPx: SCDs, lovenox  Lines: PIVs  Wounds: no drains   PT/OT: Not ordered 61yo F PMH asthma, HTN, gout, hypothyroid, super obesity (BMI 57.3) with failure of medical management of her obesity now presents for elective robot assisted sleeve gastrectomy.   BCLD  IVF  Pain/nausea control  OOB/AMB SCD'S  F/U AM LABS   Physical therapy consult   F/u SW for home care needs

## 2018-03-22 NOTE — PHYSICAL THERAPY INITIAL EVALUATION ADULT - ADDITIONAL COMMENTS
Patient [place]i n senior building, furniture cruises inside uses w/c outside. son checks in on occasion. Has grab bars in bathroom.

## 2018-03-23 ENCOUNTER — TRANSCRIPTION ENCOUNTER (OUTPATIENT)
Age: 63
End: 2018-03-23

## 2018-03-23 VITALS
OXYGEN SATURATION: 100 % | TEMPERATURE: 98 F | SYSTOLIC BLOOD PRESSURE: 130 MMHG | HEART RATE: 72 BPM | RESPIRATION RATE: 16 BRPM | DIASTOLIC BLOOD PRESSURE: 72 MMHG

## 2018-03-23 LAB
ANION GAP SERPL CALC-SCNC: 10 MMOL/L — SIGNIFICANT CHANGE UP (ref 5–17)
BUN SERPL-MCNC: 6 MG/DL — LOW (ref 7–23)
CALCIUM SERPL-MCNC: 9.6 MG/DL — SIGNIFICANT CHANGE UP (ref 8.4–10.5)
CHLORIDE SERPL-SCNC: 100 MMOL/L — SIGNIFICANT CHANGE UP (ref 96–108)
CO2 SERPL-SCNC: 30 MMOL/L — SIGNIFICANT CHANGE UP (ref 22–31)
CREAT SERPL-MCNC: 0.87 MG/DL — SIGNIFICANT CHANGE UP (ref 0.5–1.3)
GLUCOSE BLDC GLUCOMTR-MCNC: 85 MG/DL — SIGNIFICANT CHANGE UP (ref 70–99)
GLUCOSE BLDC GLUCOMTR-MCNC: 86 MG/DL — SIGNIFICANT CHANGE UP (ref 70–99)
GLUCOSE SERPL-MCNC: 90 MG/DL — SIGNIFICANT CHANGE UP (ref 70–99)
HCT VFR BLD CALC: 29.1 % — LOW (ref 34.5–45)
HGB BLD-MCNC: 9 G/DL — LOW (ref 11.5–15.5)
MAGNESIUM SERPL-MCNC: 1.7 MG/DL — SIGNIFICANT CHANGE UP (ref 1.6–2.6)
MCHC RBC-ENTMCNC: 30.6 PG — SIGNIFICANT CHANGE UP (ref 27–34)
MCHC RBC-ENTMCNC: 30.9 G/DL — LOW (ref 32–36)
MCV RBC AUTO: 99 FL — SIGNIFICANT CHANGE UP (ref 80–100)
PHOSPHATE SERPL-MCNC: 3.1 MG/DL — SIGNIFICANT CHANGE UP (ref 2.5–4.5)
PLATELET # BLD AUTO: 225 K/UL — SIGNIFICANT CHANGE UP (ref 150–400)
POTASSIUM SERPL-MCNC: 4 MMOL/L — SIGNIFICANT CHANGE UP (ref 3.5–5.3)
POTASSIUM SERPL-SCNC: 4 MMOL/L — SIGNIFICANT CHANGE UP (ref 3.5–5.3)
RBC # BLD: 2.94 M/UL — LOW (ref 3.8–5.2)
RBC # FLD: 13.9 % — SIGNIFICANT CHANGE UP (ref 10.3–16.9)
SODIUM SERPL-SCNC: 140 MMOL/L — SIGNIFICANT CHANGE UP (ref 135–145)
SURGICAL PATHOLOGY STUDY: SIGNIFICANT CHANGE UP
WBC # BLD: 5.8 K/UL — SIGNIFICANT CHANGE UP (ref 3.8–10.5)
WBC # FLD AUTO: 5.8 K/UL — SIGNIFICANT CHANGE UP (ref 3.8–10.5)

## 2018-03-23 PROCEDURE — 82962 GLUCOSE BLOOD TEST: CPT

## 2018-03-23 PROCEDURE — 80048 BASIC METABOLIC PNL TOTAL CA: CPT

## 2018-03-23 PROCEDURE — S2900: CPT

## 2018-03-23 PROCEDURE — 88307 TISSUE EXAM BY PATHOLOGIST: CPT

## 2018-03-23 PROCEDURE — 97161 PT EVAL LOW COMPLEX 20 MIN: CPT

## 2018-03-23 PROCEDURE — 86901 BLOOD TYPING SEROLOGIC RH(D): CPT

## 2018-03-23 PROCEDURE — 94002 VENT MGMT INPAT INIT DAY: CPT

## 2018-03-23 PROCEDURE — 94640 AIRWAY INHALATION TREATMENT: CPT

## 2018-03-23 PROCEDURE — 36415 COLL VENOUS BLD VENIPUNCTURE: CPT

## 2018-03-23 PROCEDURE — 82803 BLOOD GASES ANY COMBINATION: CPT

## 2018-03-23 PROCEDURE — 85027 COMPLETE CBC AUTOMATED: CPT

## 2018-03-23 PROCEDURE — 74241: CPT

## 2018-03-23 PROCEDURE — 84100 ASSAY OF PHOSPHORUS: CPT

## 2018-03-23 PROCEDURE — 86900 BLOOD TYPING SEROLOGIC ABO: CPT

## 2018-03-23 PROCEDURE — 86850 RBC ANTIBODY SCREEN: CPT

## 2018-03-23 PROCEDURE — 83735 ASSAY OF MAGNESIUM: CPT

## 2018-03-23 PROCEDURE — C9399: CPT

## 2018-03-23 PROCEDURE — 71045 X-RAY EXAM CHEST 1 VIEW: CPT

## 2018-03-23 RX ORDER — OMEPRAZOLE 10 MG/1
1 CAPSULE, DELAYED RELEASE ORAL
Qty: 0 | Refills: 0 | COMMUNITY

## 2018-03-23 RX ORDER — OMEPRAZOLE 10 MG/1
1 CAPSULE, DELAYED RELEASE ORAL
Qty: 30 | Refills: 1
Start: 2018-03-23 | End: 2018-05-21

## 2018-03-23 RX ORDER — DOCUSATE SODIUM 100 MG
1 CAPSULE ORAL
Qty: 60 | Refills: 0
Start: 2018-03-23 | End: 2018-04-21

## 2018-03-23 RX ORDER — ALBUTEROL 90 UG/1
0 AEROSOL, METERED ORAL
Qty: 0 | Refills: 0 | COMMUNITY

## 2018-03-23 RX ORDER — MAGNESIUM SULFATE 500 MG/ML
2 VIAL (ML) INJECTION ONCE
Qty: 0 | Refills: 0 | Status: COMPLETED | OUTPATIENT
Start: 2018-03-23 | End: 2018-03-23

## 2018-03-23 RX ORDER — PANTOPRAZOLE SODIUM 20 MG/1
40 TABLET, DELAYED RELEASE ORAL
Qty: 0 | Refills: 0 | Status: DISCONTINUED | OUTPATIENT
Start: 2018-03-23 | End: 2018-03-23

## 2018-03-23 RX ADMIN — Medication 50 GRAM(S): at 09:50

## 2018-03-23 RX ADMIN — PANTOPRAZOLE SODIUM 40 MILLIGRAM(S): 20 TABLET, DELAYED RELEASE ORAL at 12:15

## 2018-03-23 RX ADMIN — ENOXAPARIN SODIUM 40 MILLIGRAM(S): 100 INJECTION SUBCUTANEOUS at 12:15

## 2018-03-23 RX ADMIN — AMLODIPINE BESYLATE 10 MILLIGRAM(S): 2.5 TABLET ORAL at 06:10

## 2018-03-23 NOTE — DISCHARGE NOTE ADULT - MEDICATION SUMMARY - MEDICATIONS TO TAKE
I will START or STAY ON the medications listed below when I get home from the hospital:    oxyCODONE-acetaminophen 5 mg-325 mg oral tablet  -- 1 tab(s) by mouth every 6 hours MDD:4  -- Caution federal law prohibits the transfer of this drug to any person other  than the person for whom it was prescribed.  May cause drowsiness.  Alcohol may intensify this effect.  Use care when operating dangerous machinery.  This prescription cannot be refilled.  This product contains acetaminophen.  Do not use  with any other product containing acetaminophen to prevent possible liver damage.  Using more of this medication than prescribed may cause serious breathing problems.    -- Indication: For for severe pain    allopurinol 300 mg oral tablet  -- 1 tab(s) by mouth once a day  -- Indication: For Home medication    colchicine 0.5 mg oral tablet  -- Indication: For Home medication    albuterol 0.63 mg/3 mL (0.021%) inhalation solution  -- Indication: For Home medication    Combivent Respimat 20 mcg-100 mcg/inh inhalation aerosol  -- Indication: For Home medication    amLODIPine 10 mg oral tablet  -- 1 tab(s) by mouth once a day  -- Indication: For Home medication    Colace 100 mg oral capsule  -- 1 cap(s) by mouth 2 times a day MDD:2  -- Medication should be taken with plenty of water.    -- Indication: For Stool softener    omeprazole 40 mg oral delayed release capsule  -- 1 cap(s) by mouth once a day MDD:1  -- It is very important that you take or use this exactly as directed.  Do not skip doses or discontinue unless directed by your doctor.  Obtain medical advice before taking any non-prescription drugs as some may affect the action of this medication.  Swallow whole.  Do not crush.    -- Indication: For Antireflux    levothyroxine 88 mcg (0.088 mg) oral tablet  -- 1 tab(s) by mouth once a day  -- Indication: For Home medication

## 2018-03-23 NOTE — DISCHARGE NOTE ADULT - HOSPITAL COURSE
Ms. Henry is a 63 yo F who presented for elective bariatric surgery. She underwent a laparoscopic sleeve gastrectomy. At time of discharge the patient was tolerating an adequate amount of PO intake and was stable and ready for discharge with follow-up as outpatient. She was discharged in stable condition with vitals within normal limits.

## 2018-03-23 NOTE — PROGRESS NOTE ADULT - ASSESSMENT
63yo F PMH asthma, HTN, gout, hypothyroid, super obesity (BMI 57.3) with failure of medical management of her obesity now presents for elective robot assisted sleeve gastrectomy.     BCLD  IVF  Pain/nausea control  OOB/AMB SCD'S  F/U AM LABS   Physical therapy consult   F/u SW for home care needs 61yo F PMH asthma, HTN, gout, hypothyroid, super obesity (BMI 57.3) with failure of medical management of her obesity now presents for elective robot assisted sleeve gastrectomy.     D/c today if continuing to tolerate diet  BCLD  IVF  Pain/nausea control  OOB/AMB SCD'S  F/U AM LABS   Physical therapy consult   F/u SW for home care needs

## 2018-03-23 NOTE — DISCHARGE NOTE ADULT - PLAN OF CARE
Follow up Follow up with Dr. Smith in 1 week. Call the office at  to schedule your appointment. You may shower; soap and water over incision sites. Do not scrub. Pat dry when done. No tub bathing or swimming until cleared. Keep incision sites out of the sun as scars will darken. No heavy lifting (>10lbs) or strenuous exercise. Diet: Bariatric Full Fluids. 60 grams protein daily.  64 fluid ounces water daily. Drink small sips throughout the day. Continue diet as outlined by paperwork received as a pre-operative patient. You should be urinating at least 3-4x per day. Call the office if you experience increasing abdominal pain, nausea, vomiting, or temperature >101 F.  NO ASPIRIN OR NSAIDs until approved by Dr. Smith. Avoid alcoholic beverages until cleared by Dr. Smith. Pain control For pain control, you may take Percocet for severe pain not helped by Tylenol or Advil. Take Percocet 1-2 tabs, every 4-6 hours, as needed. Do not take Tylenol and Percocet together. While taking Percocet, you may want to take Colace (a stool softener), as the narcotics may cause constipation.

## 2018-03-23 NOTE — PROGRESS NOTE ADULT - SUBJECTIVE AND OBJECTIVE BOX
O/N: EDWIN  3/22: Patients has bed bugs, awaiting PT RECS  ,  pain controlled, VSS     POD 4 laparoscopic sleeve gastrectomy O/N: EDWIN  3/22: Patients has bed bugs, awaiting PT RECS  ,  pain controlled, VSS     POD 4 laparoscopic sleeve gastrectomy        SUBJECTIVE: Patient seen and examined bedside by chief resident. Tolerating BCLD, vitals stable.     amLODIPine   Tablet 10 milliGRAM(s) Oral daily  enoxaparin Injectable 40 milliGRAM(s) SubCutaneous every 12 hours      Vital Signs Last 24 Hrs  T(C): 36.9 (23 Mar 2018 04:20), Max: 37.1 (22 Mar 2018 11:35)  T(F): 98.4 (23 Mar 2018 04:20), Max: 98.7 (22 Mar 2018 11:35)  HR: 88 (23 Mar 2018 04:20) (81 - 95)  BP: 121/70 (23 Mar 2018 04:20) (121/70 - 136/73)  BP(mean): --  RR: 16 (23 Mar 2018 04:20) (16 - 16)  SpO2: 94% (23 Mar 2018 04:20) (94% - 100%)  I&O's Detail    22 Mar 2018 07:01  -  23 Mar 2018 07:00  --------------------------------------------------------  IN:    IV PiggyBack: 100 mL    Oral Fluid: 253 mL  Total IN: 353 mL    OUT:    Voided: 1800 mL  Total OUT: 1800 mL    Total NET: -1447 mL          General: NAD, resting comfortably in bed  C/V: NSR  Pulm: Nonlabored breathing, no respiratory distress  Abd: soft, NT/ND. Incisions CDI  Extrem: WWP, no edema, SCDs in place        LABS:                        8.3    6.5   )-----------( 213      ( 22 Mar 2018 07:21 )             26.6     03-22    137  |  99  |  8   ----------------------------<  105<H>  3.8   |  27  |  0.85    Ca    8.8      22 Mar 2018 07:21  Phos  3.3     03-22  Mg     1.6     03-22            RADIOLOGY & ADDITIONAL STUDIES:

## 2018-03-23 NOTE — DISCHARGE NOTE ADULT - CARE PLAN
Principal Discharge DX:	Obesity, morbid, BMI 50 or higher  Goal:	Follow up  Assessment and plan of treatment:	Follow up with Dr. Smith in 1 week. Call the office at  to schedule your appointment. You may shower; soap and water over incision sites. Do not scrub. Pat dry when done. No tub bathing or swimming until cleared. Keep incision sites out of the sun as scars will darken. No heavy lifting (>10lbs) or strenuous exercise. Diet: Bariatric Full Fluids. 60 grams protein daily.  64 fluid ounces water daily. Drink small sips throughout the day. Continue diet as outlined by paperwork received as a pre-operative patient. You should be urinating at least 3-4x per day. Call the office if you experience increasing abdominal pain, nausea, vomiting, or temperature >101 F.  NO ASPIRIN OR NSAIDs until approved by Dr. Smith. Avoid alcoholic beverages until cleared by Dr. Smith.  Goal:	Pain control  Assessment and plan of treatment:	For pain control, you may take Percocet for severe pain not helped by Tylenol or Advil. Take Percocet 1-2 tabs, every 4-6 hours, as needed. Do not take Tylenol and Percocet together. While taking Percocet, you may want to take Colace (a stool softener), as the narcotics may cause constipation.

## 2018-03-23 NOTE — DISCHARGE NOTE ADULT - PATIENT PORTAL LINK FT
You can access the [facility] Patient Portal, offered by [facility], by registering with the following website: [email or web address]

## 2018-03-27 ENCOUNTER — APPOINTMENT (OUTPATIENT)
Dept: SURGERY | Facility: CLINIC | Age: 63
End: 2018-03-27
Payer: MEDICARE

## 2018-03-27 VITALS
BODY MASS INDEX: 50.02 KG/M2 | HEART RATE: 76 BPM | WEIGHT: 293 LBS | DIASTOLIC BLOOD PRESSURE: 77 MMHG | SYSTOLIC BLOOD PRESSURE: 149 MMHG | TEMPERATURE: 97.4 F | OXYGEN SATURATION: 94 % | HEIGHT: 64 IN

## 2018-03-27 DIAGNOSIS — I10 ESSENTIAL (PRIMARY) HYPERTENSION: ICD-10-CM

## 2018-03-27 DIAGNOSIS — B00.9 HERPESVIRAL INFECTION, UNSPECIFIED: ICD-10-CM

## 2018-03-27 DIAGNOSIS — E66.01 MORBID (SEVERE) OBESITY DUE TO EXCESS CALORIES: ICD-10-CM

## 2018-03-27 DIAGNOSIS — K76.0 FATTY (CHANGE OF) LIVER, NOT ELSEWHERE CLASSIFIED: ICD-10-CM

## 2018-03-27 DIAGNOSIS — M10.9 GOUT, UNSPECIFIED: ICD-10-CM

## 2018-03-27 DIAGNOSIS — E03.9 HYPOTHYROIDISM, UNSPECIFIED: ICD-10-CM

## 2018-03-27 DIAGNOSIS — R09.02 HYPOXEMIA: ICD-10-CM

## 2018-03-27 DIAGNOSIS — J45.909 UNSPECIFIED ASTHMA, UNCOMPLICATED: ICD-10-CM

## 2018-03-27 PROCEDURE — 99024 POSTOP FOLLOW-UP VISIT: CPT

## 2018-04-17 ENCOUNTER — APPOINTMENT (OUTPATIENT)
Dept: INTERNAL MEDICINE | Facility: CLINIC | Age: 63
End: 2018-04-17

## 2018-04-20 ENCOUNTER — APPOINTMENT (OUTPATIENT)
Dept: INTERNAL MEDICINE | Facility: CLINIC | Age: 63
End: 2018-04-20

## 2018-04-26 ENCOUNTER — APPOINTMENT (OUTPATIENT)
Dept: SURGERY | Facility: CLINIC | Age: 63
End: 2018-04-26

## 2018-05-02 ENCOUNTER — RX RENEWAL (OUTPATIENT)
Age: 63
End: 2018-05-02

## 2018-07-10 ENCOUNTER — APPOINTMENT (OUTPATIENT)
Dept: INTERNAL MEDICINE | Facility: CLINIC | Age: 63
End: 2018-07-10
Payer: MEDICARE

## 2018-07-10 VITALS
SYSTOLIC BLOOD PRESSURE: 112 MMHG | DIASTOLIC BLOOD PRESSURE: 76 MMHG | HEART RATE: 93 BPM | RESPIRATION RATE: 16 BRPM | HEIGHT: 66 IN | WEIGHT: 293 LBS | TEMPERATURE: 98.1 F | OXYGEN SATURATION: 98 % | BODY MASS INDEX: 47.09 KG/M2

## 2018-07-10 DIAGNOSIS — R79.89 OTHER SPECIFIED ABNORMAL FINDINGS OF BLOOD CHEMISTRY: ICD-10-CM

## 2018-07-10 DIAGNOSIS — M79.606 PAIN IN LEG, UNSPECIFIED: ICD-10-CM

## 2018-07-10 DIAGNOSIS — Z86.79 PERSONAL HISTORY OF OTHER DISEASES OF THE CIRCULATORY SYSTEM: ICD-10-CM

## 2018-07-10 DIAGNOSIS — Z87.2 PERSONAL HISTORY OF DISEASES OF THE SKIN AND SUBCUTANEOUS TISSUE: ICD-10-CM

## 2018-07-10 DIAGNOSIS — Z01.818 ENCOUNTER FOR OTHER PREPROCEDURAL EXAMINATION: ICD-10-CM

## 2018-07-10 DIAGNOSIS — R25.2 CRAMP AND SPASM: ICD-10-CM

## 2018-07-10 PROCEDURE — 36415 COLL VENOUS BLD VENIPUNCTURE: CPT

## 2018-07-10 PROCEDURE — 99214 OFFICE O/P EST MOD 30 MIN: CPT | Mod: 25

## 2018-07-10 NOTE — COUNSELING
[Weight management counseling provided] : Weight management [Healthy eating counseling provided] : healthy eating [Activity counseling provided] : activity [Weight Self Once Weekly] : Weight self once weekly [Walking] : Walking [None] : None

## 2018-07-16 LAB
ALBUMIN SERPL ELPH-MCNC: 4.2 G/DL
ALP BLD-CCNC: 118 U/L
ALT SERPL-CCNC: 17 U/L
ANION GAP SERPL CALC-SCNC: 22 MMOL/L
AST SERPL-CCNC: 22 U/L
BASOPHILS # BLD AUTO: 0.01 K/UL
BASOPHILS NFR BLD AUTO: 0.2 %
BILIRUB SERPL-MCNC: 0.2 MG/DL
BUN SERPL-MCNC: 19 MG/DL
CALCIUM SERPL-MCNC: 9.4 MG/DL
CHLORIDE SERPL-SCNC: 103 MMOL/L
CO2 SERPL-SCNC: 19 MMOL/L
CREAT SERPL-MCNC: 1.62 MG/DL
EOSINOPHIL # BLD AUTO: 0.11 K/UL
EOSINOPHIL NFR BLD AUTO: 2.1 %
GLUCOSE SERPL-MCNC: 92 MG/DL
HCT VFR BLD CALC: 38.7 %
HGB BLD-MCNC: 12.1 G/DL
IMM GRANULOCYTES NFR BLD AUTO: 0 %
LYMPHOCYTES # BLD AUTO: 1.7 K/UL
LYMPHOCYTES NFR BLD AUTO: 32.9 %
MAN DIFF?: NORMAL
MCHC RBC-ENTMCNC: 29.4 PG
MCHC RBC-ENTMCNC: 31.3 GM/DL
MCV RBC AUTO: 93.9 FL
METHYLMALONATE SERPL-SCNC: 187 NMOL/L
MONOCYTES # BLD AUTO: 0.35 K/UL
MONOCYTES NFR BLD AUTO: 6.8 %
NEUTROPHILS # BLD AUTO: 2.99 K/UL
NEUTROPHILS NFR BLD AUTO: 58 %
PLATELET # BLD AUTO: 234 K/UL
POTASSIUM SERPL-SCNC: 3.9 MMOL/L
PROT SERPL-MCNC: 7.6 G/DL
RBC # BLD: 4.12 M/UL
RBC # FLD: 16.1 %
SODIUM SERPL-SCNC: 144 MMOL/L
T3 SERPL-MCNC: 85 NG/DL
T4 SERPL-MCNC: 4.3 UG/DL
TSH SERPL-ACNC: 0.19 UIU/ML
VIT B12 SERPL-MCNC: 773 PG/ML
WBC # FLD AUTO: 5.16 K/UL

## 2018-09-07 ENCOUNTER — APPOINTMENT (OUTPATIENT)
Dept: VASCULAR SURGERY | Facility: CLINIC | Age: 63
End: 2018-09-07
Payer: MEDICARE

## 2018-09-07 VITALS
BODY MASS INDEX: 50.02 KG/M2 | DIASTOLIC BLOOD PRESSURE: 76 MMHG | WEIGHT: 293 LBS | SYSTOLIC BLOOD PRESSURE: 124 MMHG | TEMPERATURE: 97.9 F | HEART RATE: 79 BPM | HEIGHT: 64 IN

## 2018-09-07 PROCEDURE — 99203 OFFICE O/P NEW LOW 30 MIN: CPT

## 2018-09-14 ENCOUNTER — APPOINTMENT (OUTPATIENT)
Dept: INTERNAL MEDICINE | Facility: CLINIC | Age: 63
End: 2018-09-14
Payer: MEDICARE

## 2018-09-14 VITALS
OXYGEN SATURATION: 98 % | SYSTOLIC BLOOD PRESSURE: 124 MMHG | HEIGHT: 66 IN | WEIGHT: 293 LBS | DIASTOLIC BLOOD PRESSURE: 76 MMHG | TEMPERATURE: 98.7 F | RESPIRATION RATE: 18 BRPM | BODY MASS INDEX: 47.09 KG/M2 | HEART RATE: 93 BPM

## 2018-09-14 PROCEDURE — 99214 OFFICE O/P EST MOD 30 MIN: CPT | Mod: 25

## 2018-09-14 PROCEDURE — 36415 COLL VENOUS BLD VENIPUNCTURE: CPT

## 2018-09-14 PROCEDURE — 90688 IIV4 VACCINE SPLT 0.5 ML IM: CPT

## 2018-09-14 PROCEDURE — G0008: CPT

## 2018-09-17 ENCOUNTER — RESULT REVIEW (OUTPATIENT)
Age: 63
End: 2018-09-17

## 2018-09-17 LAB
ANION GAP SERPL CALC-SCNC: 19 MMOL/L
BUN SERPL-MCNC: 18 MG/DL
CALCIUM SERPL-MCNC: 9 MG/DL
CHLORIDE SERPL-SCNC: 103 MMOL/L
CO2 SERPL-SCNC: 20 MMOL/L
CREAT SERPL-MCNC: 2.01 MG/DL
GLUCOSE SERPL-MCNC: 98 MG/DL
POTASSIUM SERPL-SCNC: 3.7 MMOL/L
SODIUM SERPL-SCNC: 142 MMOL/L
T3 SERPL-MCNC: 59 NG/DL
T4 SERPL-MCNC: 3.3 UG/DL
TSH SERPL-ACNC: 3.7 UIU/ML
VIT B12 SERPL-MCNC: 908 PG/ML

## 2018-09-18 LAB — METHYLMALONATE SERPL-SCNC: 164 NMOL/L

## 2018-10-19 ENCOUNTER — APPOINTMENT (OUTPATIENT)
Dept: INTERNAL MEDICINE | Facility: CLINIC | Age: 63
End: 2018-10-19
Payer: MEDICARE

## 2018-10-19 PROCEDURE — 36415 COLL VENOUS BLD VENIPUNCTURE: CPT

## 2018-10-24 LAB
ALBUMIN SERPL ELPH-MCNC: 3.9 G/DL
ALP BLD-CCNC: 100 U/L
ALT SERPL-CCNC: 12 U/L
ANION GAP SERPL CALC-SCNC: 14 MMOL/L
AST SERPL-CCNC: 16 U/L
BASOPHILS # BLD AUTO: 0.01 K/UL
BASOPHILS NFR BLD AUTO: 0.2 %
BILIRUB SERPL-MCNC: 0.3 MG/DL
BUN SERPL-MCNC: 10 MG/DL
CALCIUM SERPL-MCNC: 9 MG/DL
CHLORIDE SERPL-SCNC: 104 MMOL/L
CO2 SERPL-SCNC: 24 MMOL/L
CREAT SERPL-MCNC: 1.24 MG/DL
EOSINOPHIL # BLD AUTO: 0.42 K/UL
EOSINOPHIL NFR BLD AUTO: 6.8 %
GLUCOSE SERPL-MCNC: 109 MG/DL
HCT VFR BLD CALC: 36.9 %
HGB BLD-MCNC: 11.4 G/DL
IMM GRANULOCYTES NFR BLD AUTO: 0.2 %
LYMPHOCYTES # BLD AUTO: 2.11 K/UL
LYMPHOCYTES NFR BLD AUTO: 33.9 %
MAN DIFF?: NORMAL
MCHC RBC-ENTMCNC: 30.5 PG
MCHC RBC-ENTMCNC: 30.9 GM/DL
MCV RBC AUTO: 98.7 FL
MONOCYTES # BLD AUTO: 0.25 K/UL
MONOCYTES NFR BLD AUTO: 4 %
NEUTROPHILS # BLD AUTO: 3.42 K/UL
NEUTROPHILS NFR BLD AUTO: 54.9 %
PLATELET # BLD AUTO: 244 K/UL
POTASSIUM SERPL-SCNC: 3.7 MMOL/L
PROT SERPL-MCNC: 7 G/DL
RBC # BLD: 3.74 M/UL
RBC # FLD: 16.6 %
SODIUM SERPL-SCNC: 142 MMOL/L
TSH SERPL-ACNC: 1.6 UIU/ML
URATE SERPL-MCNC: 8.8 MG/DL
WBC # FLD AUTO: 6.22 K/UL

## 2018-10-31 ENCOUNTER — APPOINTMENT (OUTPATIENT)
Dept: INTERNAL MEDICINE | Facility: CLINIC | Age: 63
End: 2018-10-31

## 2018-11-08 ENCOUNTER — RX RENEWAL (OUTPATIENT)
Age: 63
End: 2018-11-08

## 2018-12-11 ENCOUNTER — APPOINTMENT (OUTPATIENT)
Dept: INTERNAL MEDICINE | Facility: CLINIC | Age: 63
End: 2018-12-11
Payer: MEDICARE

## 2018-12-11 VITALS
HEART RATE: 77 BPM | TEMPERATURE: 98.1 F | RESPIRATION RATE: 18 BRPM | DIASTOLIC BLOOD PRESSURE: 64 MMHG | WEIGHT: 293 LBS | OXYGEN SATURATION: 98 % | BODY MASS INDEX: 47.09 KG/M2 | SYSTOLIC BLOOD PRESSURE: 120 MMHG | HEIGHT: 66 IN

## 2018-12-11 DIAGNOSIS — G89.29 PAIN IN RIGHT SHOULDER: ICD-10-CM

## 2018-12-11 DIAGNOSIS — R07.89 OTHER CHEST PAIN: ICD-10-CM

## 2018-12-11 DIAGNOSIS — M25.511 PAIN IN RIGHT SHOULDER: ICD-10-CM

## 2018-12-11 DIAGNOSIS — L98.7 EXCESSIVE AND REDUNDANT SKIN AND SUBCUTANEOUS TISSUE: ICD-10-CM

## 2018-12-11 PROCEDURE — 99215 OFFICE O/P EST HI 40 MIN: CPT | Mod: 25

## 2018-12-11 PROCEDURE — 36415 COLL VENOUS BLD VENIPUNCTURE: CPT

## 2018-12-11 NOTE — PLAN
[FreeTextEntry1] : # Hypertension\par  - Stop amlodipine for now\par \par # B-12 deficiency\par  - Will obtain levels today\par  - Continue with all current treatments. \par \par # Morbid obesity\par  - Continue with current treatment.

## 2018-12-11 NOTE — PLAN
[FreeTextEntry1] :  - Continue with all current treatments.\par  - Labs done in office\par  - Will repeat BMP to re-assess VY

## 2018-12-11 NOTE — PHYSICAL EXAM
[No Acute Distress] : no acute distress [Well Nourished] : well nourished [Well Developed] : well developed [Well-Appearing] : well-appearing [Normal Sclera/Conjunctiva] : normal sclera/conjunctiva [EOMI] : extraocular movements intact [No Respiratory Distress] : no respiratory distress  [Clear to Auscultation] : lungs were clear to auscultation bilaterally [No Accessory Muscle Use] : no accessory muscle use [Normal Rate] : normal rate  [Regular Rhythm] : with a regular rhythm [Normal S1, S2] : normal S1 and S2 [No Murmur] : no murmur heard [Speech Grossly Normal] : speech grossly normal [Memory Grossly Normal] : memory grossly normal [Normal Affect] : the affect was normal [Alert and Oriented x3] : oriented to person, place, and time [Normal Mood] : the mood was normal [Normal Insight/Judgement] : insight and judgment were intact [de-identified] : Morbid obesity [de-identified] : Decrease range of motion of the right shoulder with inability to laterally extend the right shoulder above the horizon. Right lateral aspect of the pectoralis major have significant tenderness to palpation.

## 2018-12-11 NOTE — PLAN
[FreeTextEntry1] :  - Labs done in office \par - Orthopedic surgery evaluation\par - Patient was encouraged to wear support bra as she has significant breast ptosis which is likely causing significant strain on the anterior chest wall muscles\par - Patient will likely benefit from plastic surgery evaluation for breast reduction and removal of excessive skin in view of her recent weight loss. \par - Continue with all other treatments \par - Tylenol as needed for pain

## 2018-12-11 NOTE — HISTORY OF PRESENT ILLNESS
[FreeTextEntry1] : Follow up for chronic medical conditions  [de-identified] : Patient presents for follow up for her chronic medical conditions. She states that she has lost significant weight since her surgery. No complaints at present.

## 2018-12-11 NOTE — HEALTH RISK ASSESSMENT
[No falls in past year] : Patient reported no falls in the past year [0] : 2) Feeling down, depressed, or hopeless: Not at all (0) [] : No [de-identified] : socially [[id number]] : 0

## 2018-12-11 NOTE — HISTORY OF PRESENT ILLNESS
[Other: _____] : [unfilled] [FreeTextEntry1] : Follow up for chronic medical conditions  [de-identified] : Patient presents for follow up for her chronic medical conditions. She reports compliance with all prescribed medical therapy and dietary restrictions. No complaints at present.

## 2018-12-11 NOTE — PHYSICAL EXAM
[No Acute Distress] : no acute distress [Well Nourished] : well nourished [Well Developed] : well developed [Well-Appearing] : well-appearing [Normal Sclera/Conjunctiva] : normal sclera/conjunctiva [EOMI] : extraocular movements intact [No Respiratory Distress] : no respiratory distress  [Clear to Auscultation] : lungs were clear to auscultation bilaterally [No Accessory Muscle Use] : no accessory muscle use [Normal Rate] : normal rate  [Regular Rhythm] : with a regular rhythm [Normal S1, S2] : normal S1 and S2 [No Murmur] : no murmur heard [Speech Grossly Normal] : speech grossly normal [Memory Grossly Normal] : memory grossly normal [Normal Affect] : the affect was normal [Alert and Oriented x3] : oriented to person, place, and time [Normal Mood] : the mood was normal [Normal Insight/Judgement] : insight and judgment were intact [de-identified] : Morbid obesity

## 2018-12-11 NOTE — HISTORY OF PRESENT ILLNESS
[FreeTextEntry8] : Patient presents for evaluation of right sided chest pain of several months in duration. She reports decrease range of motion of the right shoulder in the past few months. She reports compliance with all prescribed medical therapy. She also reports ongoing intentional weight loss. she also reports recurrent falls because she of her poor balance secondary to her loose skin. No other complaints at present.

## 2018-12-11 NOTE — PHYSICAL EXAM
[No Acute Distress] : no acute distress [Well Nourished] : well nourished [Well Developed] : well developed [Well-Appearing] : well-appearing [Normal Sclera/Conjunctiva] : normal sclera/conjunctiva [EOMI] : extraocular movements intact [No Respiratory Distress] : no respiratory distress  [Clear to Auscultation] : lungs were clear to auscultation bilaterally [No Accessory Muscle Use] : no accessory muscle use [Normal Rate] : normal rate  [Regular Rhythm] : with a regular rhythm [Normal S1, S2] : normal S1 and S2 [No Murmur] : no murmur heard [Speech Grossly Normal] : speech grossly normal [Memory Grossly Normal] : memory grossly normal [Normal Affect] : the affect was normal [Alert and Oriented x3] : oriented to person, place, and time [Normal Mood] : the mood was normal [Normal Insight/Judgement] : insight and judgment were intact [de-identified] : Morbid obesity

## 2018-12-11 NOTE — ASSESSMENT
[FreeTextEntry1] : 63 y.o. woman with multiple medical comorbidities now right anterior chest wall pain and right shoulder pain likely secondary to severe muscle

## 2018-12-11 NOTE — HEALTH RISK ASSESSMENT
[No falls in past year] : Patient reported no falls in the past year [0] : 2) Feeling down, depressed, or hopeless: Not at all (0) [] : No [[id number]] : 0

## 2018-12-11 NOTE — HEALTH RISK ASSESSMENT
[Any fall with injury in past year] : Patient reported fall with injury in the past year [0] : 2) Feeling down, depressed, or hopeless: Not at all (0) [] : No [de-identified] : PT reports drinking socially. [de-identified] : PT reports falling over the weekend, no injuries. [[id number]] : 0

## 2018-12-13 ENCOUNTER — RESULT REVIEW (OUTPATIENT)
Age: 63
End: 2018-12-13

## 2018-12-13 LAB
BASOPHILS # BLD AUTO: 0.02 K/UL
BASOPHILS NFR BLD AUTO: 0.4 %
EOSINOPHIL # BLD AUTO: 0.2 K/UL
EOSINOPHIL NFR BLD AUTO: 3.9 %
HCT VFR BLD CALC: 39.3 %
HGB BLD-MCNC: 12.5 G/DL
IMM GRANULOCYTES NFR BLD AUTO: 0.2 %
LYMPHOCYTES # BLD AUTO: 1.48 K/UL
LYMPHOCYTES NFR BLD AUTO: 29.1 %
MAN DIFF?: NORMAL
MCHC RBC-ENTMCNC: 30.9 PG
MCHC RBC-ENTMCNC: 31.8 GM/DL
MCV RBC AUTO: 97 FL
MONOCYTES # BLD AUTO: 0.31 K/UL
MONOCYTES NFR BLD AUTO: 6.1 %
NEUTROPHILS # BLD AUTO: 3.07 K/UL
NEUTROPHILS NFR BLD AUTO: 60.3 %
PLATELET # BLD AUTO: 230 K/UL
RBC # BLD: 4.05 M/UL
RBC # FLD: 14.8 %
TSH SERPL-ACNC: 4.43 UIU/ML
VIT B12 SERPL-MCNC: 829 PG/ML
WBC # FLD AUTO: 5.09 K/UL

## 2018-12-14 ENCOUNTER — APPOINTMENT (OUTPATIENT)
Dept: VASCULAR SURGERY | Facility: CLINIC | Age: 63
End: 2018-12-14
Payer: MEDICARE

## 2018-12-14 VITALS
BODY MASS INDEX: 47.09 KG/M2 | WEIGHT: 293 LBS | DIASTOLIC BLOOD PRESSURE: 78 MMHG | HEIGHT: 66 IN | SYSTOLIC BLOOD PRESSURE: 130 MMHG | TEMPERATURE: 98 F

## 2018-12-14 PROCEDURE — 93970 EXTREMITY STUDY: CPT

## 2018-12-14 PROCEDURE — 99213 OFFICE O/P EST LOW 20 MIN: CPT

## 2018-12-17 ENCOUNTER — RESULT REVIEW (OUTPATIENT)
Age: 63
End: 2018-12-17

## 2018-12-17 LAB
ALBUMIN SERPL ELPH-MCNC: 4.5 G/DL
ALP BLD-CCNC: 144 U/L
ALT SERPL-CCNC: 10 U/L
ANION GAP SERPL CALC-SCNC: 24 MMOL/L
AST SERPL-CCNC: 18 U/L
BILIRUB SERPL-MCNC: 0.3 MG/DL
BUN SERPL-MCNC: 10 MG/DL
CALCIUM SERPL-MCNC: 9.6 MG/DL
CHLORIDE SERPL-SCNC: 108 MMOL/L
CO2 SERPL-SCNC: 17 MMOL/L
CREAT SERPL-MCNC: 1.58 MG/DL
GLUCOSE SERPL-MCNC: 89 MG/DL
METHYLMALONATE SERPL-SCNC: 144 NMOL/L
POTASSIUM SERPL-SCNC: 4.7 MMOL/L
PROT SERPL-MCNC: 8.1 G/DL
SODIUM SERPL-SCNC: 149 MMOL/L

## 2018-12-28 ENCOUNTER — APPOINTMENT (OUTPATIENT)
Dept: INTERNAL MEDICINE | Facility: CLINIC | Age: 63
End: 2018-12-28

## 2018-12-31 ENCOUNTER — MESSAGE (OUTPATIENT)
Age: 63
End: 2018-12-31

## 2019-01-31 ENCOUNTER — RX RENEWAL (OUTPATIENT)
Age: 64
End: 2019-01-31

## 2019-02-03 PROBLEM — T78.40XA ALLERGY: Status: ACTIVE | Noted: 2017-04-07

## 2019-02-14 ENCOUNTER — RX RENEWAL (OUTPATIENT)
Age: 64
End: 2019-02-14

## 2019-03-25 ENCOUNTER — RX RENEWAL (OUTPATIENT)
Age: 64
End: 2019-03-25

## 2019-03-29 ENCOUNTER — MEDICATION RENEWAL (OUTPATIENT)
Age: 64
End: 2019-03-29

## 2019-04-18 ENCOUNTER — APPOINTMENT (OUTPATIENT)
Dept: INTERNAL MEDICINE | Facility: CLINIC | Age: 64
End: 2019-04-18

## 2019-04-30 ENCOUNTER — MEDICATION RENEWAL (OUTPATIENT)
Age: 64
End: 2019-04-30

## 2019-05-16 ENCOUNTER — APPOINTMENT (OUTPATIENT)
Dept: INTERNAL MEDICINE | Facility: CLINIC | Age: 64
End: 2019-05-16
Payer: MEDICARE

## 2019-05-16 VITALS
TEMPERATURE: 98 F | OXYGEN SATURATION: 95 % | HEIGHT: 66 IN | HEART RATE: 80 BPM | WEIGHT: 293 LBS | DIASTOLIC BLOOD PRESSURE: 44 MMHG | BODY MASS INDEX: 47.09 KG/M2 | RESPIRATION RATE: 18 BRPM | SYSTOLIC BLOOD PRESSURE: 104 MMHG

## 2019-05-16 DIAGNOSIS — N17.9 ACUTE KIDNEY FAILURE, UNSPECIFIED: ICD-10-CM

## 2019-05-16 PROCEDURE — 99497 ADVNCD CARE PLAN 30 MIN: CPT | Mod: 25

## 2019-05-16 PROCEDURE — 99213 OFFICE O/P EST LOW 20 MIN: CPT | Mod: 25

## 2019-05-16 PROCEDURE — 36415 COLL VENOUS BLD VENIPUNCTURE: CPT

## 2019-05-16 PROCEDURE — 99396 PREV VISIT EST AGE 40-64: CPT | Mod: 25

## 2019-05-17 PROBLEM — N17.9 AKI (ACUTE KIDNEY INJURY): Noted: 2018-07-16

## 2019-05-17 NOTE — PLAN
[FreeTextEntry1] : - Pulmonary evaluation for hypoxia and SONU\par - Cardiology evaluation for CAD\par - Patient states that she is no longer taking eliquis for her DVT and that the medication was discontinued\par - Start on PT for frequent falls\par - Repeat Vitamin B12 level\par  - Labs done in office\par - Further management pending lab results

## 2019-05-17 NOTE — HISTORY OF PRESENT ILLNESS
[de-identified] : Patient presents for CPE. She reports recurrent falls. She states that she stayed indoors during the winter months because she has difficulty with ambulation. She states that the majority of her falls occur when she is going from a sitting to a standing position. The falls are not associated with syncope. \par \par patient did not follow up with nephrology as was previously requested.  [FreeTextEntry1] : Patient presents for CPE.

## 2019-05-17 NOTE — PHYSICAL EXAM
[Well Nourished] : well nourished [No Acute Distress] : no acute distress [Well Developed] : well developed [Well-Appearing] : well-appearing [EOMI] : extraocular movements intact [Normal Sclera/Conjunctiva] : normal sclera/conjunctiva [No JVD] : no jugular venous distention [No Respiratory Distress] : no respiratory distress  [Normal Outer Ear/Nose] : the outer ears and nose were normal in appearance [No Accessory Muscle Use] : no accessory muscle use [Clear to Auscultation] : lungs were clear to auscultation bilaterally [Regular Rhythm] : with a regular rhythm [Normal Rate] : normal rate  [Normal S1, S2] : normal S1 and S2 [No Murmur] : no murmur heard [Normal Affect] : the affect was normal [Memory Grossly Normal] : memory grossly normal [Speech Grossly Normal] : speech grossly normal [Normal Mood] : the mood was normal [Alert and Oriented x3] : oriented to person, place, and time [Normal Insight/Judgement] : insight and judgment were intact [Non Tender] : non-tender [Declined Breast Exam] : declined breast exam  [Soft] : abdomen soft [Non-distended] : non-distended [No Masses] : no abdominal mass palpated [Normal Bowel Sounds] : normal bowel sounds [de-identified] : Morbid obesity [de-identified] : b/l submandibular lymphadenopathy [de-identified] : Decrease range of motion of the right shoulder with inability to laterally extend the right shoulder above the horizon. Right lateral aspect of the pectoralis major have significant tenderness to palpation.

## 2019-05-30 DIAGNOSIS — E55.9 VITAMIN D DEFICIENCY, UNSPECIFIED: ICD-10-CM

## 2019-05-30 LAB
25(OH)D3 SERPL-MCNC: 10.5 NG/ML
ALBUMIN SERPL ELPH-MCNC: 4.1 G/DL
ALP BLD-CCNC: 134 U/L
ALT SERPL-CCNC: 11 U/L
ANION GAP SERPL CALC-SCNC: 13 MMOL/L
AST SERPL-CCNC: 12 U/L
BASOPHILS # BLD AUTO: 0.01 K/UL
BASOPHILS NFR BLD AUTO: 0.2 %
BILIRUB SERPL-MCNC: 0.2 MG/DL
BUN SERPL-MCNC: 18 MG/DL
CALCIUM SERPL-MCNC: 9.5 MG/DL
CHLORIDE SERPL-SCNC: 107 MMOL/L
CHOLEST SERPL-MCNC: 215 MG/DL
CHOLEST/HDLC SERPL: 2.3 RATIO
CO2 SERPL-SCNC: 20 MMOL/L
CREAT SERPL-MCNC: 1.62 MG/DL
EOSINOPHIL # BLD AUTO: 0.04 K/UL
EOSINOPHIL NFR BLD AUTO: 0.7 %
ESTIMATED AVERAGE GLUCOSE: 91 MG/DL
GLUCOSE SERPL-MCNC: 102 MG/DL
HBA1C MFR BLD HPLC: 4.8 %
HCT VFR BLD CALC: 32.6 %
HDLC SERPL-MCNC: 93 MG/DL
HGB BLD-MCNC: 10.2 G/DL
IMM GRANULOCYTES NFR BLD AUTO: 0.3 %
LDLC SERPL CALC-MCNC: 99 MG/DL
LYMPHOCYTES # BLD AUTO: 1.11 K/UL
LYMPHOCYTES NFR BLD AUTO: 18.4 %
MAN DIFF?: NORMAL
MCHC RBC-ENTMCNC: 31.3 GM/DL
MCHC RBC-ENTMCNC: 31.7 PG
MCV RBC AUTO: 101.2 FL
MONOCYTES # BLD AUTO: 0.31 K/UL
MONOCYTES NFR BLD AUTO: 5.1 %
NEUTROPHILS # BLD AUTO: 4.54 K/UL
NEUTROPHILS NFR BLD AUTO: 75.3 %
PLATELET # BLD AUTO: 241 K/UL
POTASSIUM SERPL-SCNC: 4.4 MMOL/L
PROT SERPL-MCNC: 7.3 G/DL
RBC # BLD: 3.22 M/UL
RBC # FLD: 15.1 %
SODIUM SERPL-SCNC: 140 MMOL/L
TRIGL SERPL-MCNC: 113 MG/DL
TSH SERPL-ACNC: 3.77 UIU/ML
VIT B12 SERPL-MCNC: 947 PG/ML
WBC # FLD AUTO: 6.03 K/UL

## 2019-06-17 ENCOUNTER — APPOINTMENT (OUTPATIENT)
Dept: INTERNAL MEDICINE | Facility: CLINIC | Age: 64
End: 2019-06-17
Payer: MEDICARE

## 2019-06-17 VITALS
OXYGEN SATURATION: 94 % | HEART RATE: 66 BPM | WEIGHT: 293 LBS | TEMPERATURE: 97.9 F | HEIGHT: 66 IN | BODY MASS INDEX: 47.09 KG/M2 | RESPIRATION RATE: 18 BRPM | SYSTOLIC BLOOD PRESSURE: 184 MMHG | DIASTOLIC BLOOD PRESSURE: 88 MMHG

## 2019-06-17 PROCEDURE — 99213 OFFICE O/P EST LOW 20 MIN: CPT

## 2019-06-17 NOTE — COUNSELING
[Healthy eating counseling provided] : healthy eating [Weight management counseling provided] : Weight management [Low Fat Diet] : Low fat diet [Activity counseling provided] : activity [Good understanding] : Patient has a good understanding of lifestyle changes and the steps needed to achieve self management goals [Low Salt Diet] : Low salt diet [None] : None

## 2019-06-17 NOTE — PHYSICAL EXAM
[Well Nourished] : well nourished [Well Developed] : well developed [No Acute Distress] : no acute distress [No Respiratory Distress] : no respiratory distress  [Well-Appearing] : well-appearing [Clear to Auscultation] : lungs were clear to auscultation bilaterally [No Accessory Muscle Use] : no accessory muscle use [Normal Rate] : normal rate  [Regular Rhythm] : with a regular rhythm [de-identified] : Obese [No Murmur] : no murmur heard [Normal S1, S2] : normal S1 and S2

## 2019-06-17 NOTE — HISTORY OF PRESENT ILLNESS
[Other: _____] : [unfilled] [FreeTextEntry1] : Follow up for chronic medical conditions  [de-identified] : Patient presents for follow up for her chronic medical conditions. She reports compliance with all prescribed medical therapy and dietary restrictions.

## 2019-06-17 NOTE — PLAN
[Fatigue] : fatigue [Negative] : Allergic/Immunologic [Chest Pain] : no chest pain [Palpitations] : no palpitations [Lower Ext Edema] : no lower extremity edema [Shortness Of Breath] : no shortness of breath [Wheezing] : no wheezing [Cough] : no cough [SOB on Exertion] : no shortness of breath during exertion [FreeTextEntry6] : SOB completely resolved [de-identified] : worsening peripheral neuropathy, not painful [FreeTextEntry1] : - Restart on amlodipine\par - Low salt diet\par - Repeat BP in 1 week

## 2019-06-17 NOTE — HEALTH RISK ASSESSMENT
[One fall no injury in past year] : Patient reported one fall in the past year without injury [0] : 1) Little interest or pleasure doing things: Not at all (0) [3] : 2) Feeling down, depressed, or hopeless for nearly every day (3) [] : No [de-identified] : Walking, [employer] [[id number]] : 3

## 2019-06-21 ENCOUNTER — NON-APPOINTMENT (OUTPATIENT)
Age: 64
End: 2019-06-21

## 2019-06-21 ENCOUNTER — APPOINTMENT (OUTPATIENT)
Dept: CARDIOLOGY | Facility: CLINIC | Age: 64
End: 2019-06-21
Payer: MEDICARE

## 2019-06-21 ENCOUNTER — MEDICATION RENEWAL (OUTPATIENT)
Age: 64
End: 2019-06-21

## 2019-06-21 VITALS — SYSTOLIC BLOOD PRESSURE: 120 MMHG | DIASTOLIC BLOOD PRESSURE: 70 MMHG

## 2019-06-21 VITALS — HEART RATE: 71 BPM | WEIGHT: 291 LBS | OXYGEN SATURATION: 97 % | BODY MASS INDEX: 46.97 KG/M2

## 2019-06-21 VITALS — SYSTOLIC BLOOD PRESSURE: 130 MMHG | DIASTOLIC BLOOD PRESSURE: 70 MMHG

## 2019-06-21 VITALS — DIASTOLIC BLOOD PRESSURE: 80 MMHG | SYSTOLIC BLOOD PRESSURE: 140 MMHG

## 2019-06-21 VITALS — HEIGHT: 66 IN

## 2019-06-21 DIAGNOSIS — I82.492 ACUTE EMBOLISM AND THROMBOSIS OF OTHER SPECIFIED DEEP VEIN OF LEFT LOWER EXTREMITY: ICD-10-CM

## 2019-06-21 PROCEDURE — 93000 ELECTROCARDIOGRAM COMPLETE: CPT

## 2019-06-21 PROCEDURE — 99215 OFFICE O/P EST HI 40 MIN: CPT

## 2019-06-21 RX ORDER — AMLODIPINE BESYLATE 5 MG/1
5 TABLET ORAL DAILY
Qty: 30 | Refills: 2 | Status: DISCONTINUED | COMMUNITY
Start: 2018-01-25 | End: 2019-06-21

## 2019-06-27 ENCOUNTER — APPOINTMENT (OUTPATIENT)
Dept: INTERNAL MEDICINE | Facility: CLINIC | Age: 64
End: 2019-06-27

## 2019-07-02 NOTE — DISCUSSION/SUMMARY
[FreeTextEntry1] : I discontinued amlodipine.  I would recommend compression stockings for the time being to help with postural hypotension and dizziness.  I would prefer not to use Florinef.  She should continue to try and lose weight and also use CPAP as quickly as possible.  Shortness of breath may be related to restrictive lung disease on top of her history of asthma.\par \par For further evaluation and to rule out cardiac cause, especially when there is a history of CHF in the past, I ordered an echocardiogram.She will need a stress test to rule out ischemia as a cause.  However I am not sure about the quality of images we may get, because of her being  heavy.  We will defer it for the time being.\par \par She should consider physical therapy to prevent falls.  I think it would be a good idea for her to also get pulmonary function testing done as part of workup for shortness of breath.\par \par Maneuvers  to avoid postural hypotension were explained.

## 2019-07-02 NOTE — ASSESSMENT
[FreeTextEntry1] : She has multiple serious comorbid conditions.  Because of problems with the pharmacy she is not on any medication currently except Tylenol, Tylenol PM, ibuprofen and colchicine for gout. She has osteo and rheumatoid arthritis causing pain all over her body and hence she takes the pain medications. \par \par She is not on any blood pressure lowering medications.  She is supposed to be taking amlodipine 5 mg daily but she never received it from the pharmacy.\par \par On examination she has postural hypotension.  That may be the reason for her dizziness and falls.\par \par Her sleep apnea presently is untreated and that will contribute to hypertension and other problems.

## 2019-07-02 NOTE — HISTORY OF PRESENT ILLNESS
[FreeTextEntry1] : 63-year-old lady was referred by Dr. Bazan for cardiac evaluation because of increasing shortness of breath over the last one year.  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  On one occasion she actually was admitted with acute respiratory failure in 2002.\par \par While working, she used to weigh about 193 pounds.  After a respiratory failure she was started on prednisone and she gained weight very rapidly and had gone up to 603 pounds.  She was in nursing home subsequently for 3 years and gradually she was tapered off the high dose prednisone.  She also had history of asthma.  In March of last year she had gastric sleeve procedure done and her weight has come down from 603 pounds to 293 pounds and she is continuing to lose weight.\par \par Over the last one year she has been having increasing shortness of breath.  She gets it only when she is walking or talking..\par \par She has right-sided chest pain which is localized.  It comes and goes and is a sharp pain like a flash and lasts few seconds.  It is associated with chest wall tenderness. It is unrelated to physical activity.  \par \par She snores a lot and her sleep is disturbed and noT refreshing.  There is no daytime sedation.  People have observed that she stops breathing while sleeping.  She is a confirmed case of sleep apnea and she was using CPAP mask regularly until recently.  It got infested with bed bugs and she is awaiting a new machine and a new mask.\par \par She gets lightheaded when she stands up.  She cannot stand up quickly she takes her own time to stand up but she feels lightheaded and has had couple of falls.\par

## 2019-07-02 NOTE — REASON FOR VISIT
[Chest Pain] : chest pain [Initial Evaluation] : an initial evaluation of [Dizziness] : dizziness [Dyspnea] : dyspnea [FreeTextEntry1] : obesity, Falls.

## 2019-07-02 NOTE — PHYSICAL EXAM
[Normal Appearance] : normal appearance [Well Groomed] : well groomed [No Deformities] : no deformities [Normal Conjunctiva] : the conjunctiva exhibited no abnormalities [General Appearance - In No Acute Distress] : no acute distress [Normal Oral Mucosa] : normal oral mucosa [Eyelids - No Xanthelasma] : the eyelids demonstrated no xanthelasmas [No Oral Pallor] : no oral pallor [No Oral Cyanosis] : no oral cyanosis [Normal Jugular Venous V Waves Present] : normal jugular venous V waves present [Normal Jugular Venous A Waves Present] : normal jugular venous A waves present [Heart Rate And Rhythm] : heart rate and rhythm were normal [Heart Sounds] : normal S1 and S2 [No Jugular Venous Paris A Waves] : no jugular venous paris A waves [Respiration, Rhythm And Depth] : normal respiratory rhythm and effort [Systolic grade ___/6] : A grade [unfilled]/6 systolic murmur was heard. [Auscultation Breath Sounds / Voice Sounds] : lungs were clear to auscultation bilaterally [Exaggerated Use Of Accessory Muscles For Inspiration] : no accessory muscle use [Abdomen Tenderness] : non-tender [Abdomen Soft] : soft [Abdomen Mass (___ Cm)] : no abdominal mass palpated [Abnormal Walk] : normal gait [Skin Color & Pigmentation] : normal skin color and pigmentation [] : no rash [No Venous Stasis] : no venous stasis [Skin Lesions] : no skin lesions [No Xanthoma] : no  xanthoma was observed [No Skin Ulcers] : no skin ulcer [Affect] : the affect was normal [Oriented To Time, Place, And Person] : oriented to person, place, and time [No Anxiety] : not feeling anxious [Mood] : the mood was normal [FreeTextEntry1] : trace edema

## 2019-07-26 ENCOUNTER — APPOINTMENT (OUTPATIENT)
Dept: CARDIOLOGY | Facility: CLINIC | Age: 64
End: 2019-07-26

## 2019-08-15 ENCOUNTER — APPOINTMENT (OUTPATIENT)
Dept: INTERNAL MEDICINE | Facility: CLINIC | Age: 64
End: 2019-08-15

## 2019-08-29 ENCOUNTER — LABORATORY RESULT (OUTPATIENT)
Age: 64
End: 2019-08-29

## 2019-08-30 ENCOUNTER — APPOINTMENT (OUTPATIENT)
Dept: INTERNAL MEDICINE | Facility: CLINIC | Age: 64
End: 2019-08-30
Payer: MEDICARE

## 2019-08-30 VITALS
OXYGEN SATURATION: 100 % | RESPIRATION RATE: 16 BRPM | DIASTOLIC BLOOD PRESSURE: 84 MMHG | WEIGHT: 287 LBS | TEMPERATURE: 97.9 F | BODY MASS INDEX: 46.12 KG/M2 | HEART RATE: 77 BPM | HEIGHT: 66 IN | SYSTOLIC BLOOD PRESSURE: 156 MMHG

## 2019-08-30 DIAGNOSIS — R29.6 REPEATED FALLS: ICD-10-CM
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
## 2019-08-30 DIAGNOSIS — Z00.00 ENCOUNTER FOR GENERAL ADULT MEDICAL EXAMINATION W/OUT ABNORMAL FINDINGS: ICD-10-CM

## 2019-08-30 PROCEDURE — 99215 OFFICE O/P EST HI 40 MIN: CPT | Mod: 25

## 2019-08-30 PROCEDURE — 36415 COLL VENOUS BLD VENIPUNCTURE: CPT

## 2019-08-30 NOTE — HEALTH RISK ASSESSMENT
[Yes] : Yes [2 - 3 times a week (3 pts)] : 2 - 3  times a week (3 points) [1 or 2 (0 pts)] : 1 or 2 (0 points) [Never (0 pts)] : Never (0 points) [No] : In the past 12 months have you used drugs other than those required for medical reasons? No [One fall no injury in past year] : Patient reported one fall in the past year without injury [0] : 1) Little interest or pleasure doing things: Not at all (0) [3] : 2) Feeling down, depressed, or hopeless for nearly every day (3) [] : No [Audit-CScore] : 3 [[id number]] : 3
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
## 2019-08-30 NOTE — PHYSICAL EXAM
[No Acute Distress] : no acute distress [Well Nourished] : well nourished [Well Developed] : well developed [Well-Appearing] : well-appearing [No Respiratory Distress] : no respiratory distress  [No Accessory Muscle Use] : no accessory muscle use [Clear to Auscultation] : lungs were clear to auscultation bilaterally [Normal Rate] : normal rate  [Regular Rhythm] : with a regular rhythm [Normal S1, S2] : normal S1 and S2 [No Murmur] : no murmur heard [de-identified] : Obese

## 2019-08-30 NOTE — PLAN
[FreeTextEntry1] : - In view of previous history of DVT and now PE, I will restart full dose anticoagulation\par  - Labs done in office\par - Further management pending lab results\par

## 2019-09-16 LAB
ALBUMIN SERPL ELPH-MCNC: 3.6 G/DL
ALP BLD-CCNC: 158 U/L
ALT SERPL-CCNC: 11 U/L
ANION GAP SERPL CALC-SCNC: 14 MMOL/L
AST SERPL-CCNC: 25 U/L
BASOPHILS # BLD AUTO: 0 K/UL
BASOPHILS NFR BLD AUTO: 0 %
BILIRUB SERPL-MCNC: 0.3 MG/DL
BUN SERPL-MCNC: 12 MG/DL
CALCIUM SERPL-MCNC: 8.6 MG/DL
CHLORIDE SERPL-SCNC: 107 MMOL/L
CO2 SERPL-SCNC: 21 MMOL/L
CREAT SERPL-MCNC: 1.72 MG/DL
EOSINOPHIL # BLD AUTO: 0.01 K/UL
EOSINOPHIL NFR BLD AUTO: 0.2 %
GLUCOSE SERPL-MCNC: 75 MG/DL
HCT VFR BLD CALC: 28.5 %
HGB BLD-MCNC: 8.1 G/DL
IMM GRANULOCYTES NFR BLD AUTO: 0.4 %
LYMPHOCYTES # BLD AUTO: 1.56 K/UL
LYMPHOCYTES NFR BLD AUTO: 29.3 %
MAN DIFF?: NORMAL
MCHC RBC-ENTMCNC: 28.4 GM/DL
MCHC RBC-ENTMCNC: 30.8 PG
MCV RBC AUTO: 108.4 FL
MONOCYTES # BLD AUTO: 0.36 K/UL
MONOCYTES NFR BLD AUTO: 6.8 %
NEUTROPHILS # BLD AUTO: 3.38 K/UL
NEUTROPHILS NFR BLD AUTO: 63.3 %
PLATELET # BLD AUTO: 287 K/UL
POTASSIUM SERPL-SCNC: 3.7 MMOL/L
PROT SERPL-MCNC: 6.3 G/DL
RBC # BLD: 2.63 M/UL
RBC # FLD: 15.3 %
SODIUM SERPL-SCNC: 142 MMOL/L
TSH SERPL-ACNC: 4.42 UIU/ML
URATE SERPL-MCNC: 5.4 MG/DL
VIT B12 SERPL-MCNC: 1008 PG/ML
WBC # FLD AUTO: 5.33 K/UL

## 2019-09-17 ENCOUNTER — APPOINTMENT (OUTPATIENT)
Dept: INTERNAL MEDICINE | Facility: CLINIC | Age: 64
End: 2019-09-17

## 2019-09-24 ENCOUNTER — APPOINTMENT (OUTPATIENT)
Dept: CARDIOLOGY | Facility: CLINIC | Age: 64
End: 2019-09-24

## 2019-10-02 ENCOUNTER — RX RENEWAL (OUTPATIENT)
Age: 64
End: 2019-10-02

## 2019-10-11 ENCOUNTER — APPOINTMENT (OUTPATIENT)
Dept: INTERNAL MEDICINE | Facility: CLINIC | Age: 64
End: 2019-10-11

## 2019-10-22 ENCOUNTER — APPOINTMENT (OUTPATIENT)
Dept: CARDIOLOGY | Facility: CLINIC | Age: 64
End: 2019-10-22
Payer: MEDICARE

## 2019-10-22 ENCOUNTER — MEDICATION RENEWAL (OUTPATIENT)
Age: 64
End: 2019-10-22

## 2019-10-22 ENCOUNTER — NON-APPOINTMENT (OUTPATIENT)
Age: 64
End: 2019-10-22

## 2019-10-22 VITALS — HEART RATE: 90 BPM | OXYGEN SATURATION: 98 % | HEIGHT: 66 IN

## 2019-10-22 VITALS — WEIGHT: 283 LBS | BODY MASS INDEX: 45.68 KG/M2

## 2019-10-22 VITALS — SYSTOLIC BLOOD PRESSURE: 124 MMHG | DIASTOLIC BLOOD PRESSURE: 70 MMHG

## 2019-10-22 PROCEDURE — 99214 OFFICE O/P EST MOD 30 MIN: CPT

## 2019-10-22 PROCEDURE — 93000 ELECTROCARDIOGRAM COMPLETE: CPT

## 2019-10-22 NOTE — HISTORY OF PRESENT ILLNESS
[FreeTextEntry1] : She was admitted to [facility] in July because of shortness of breath and false etc.  She was noted to have pulmonary embolism and was started on heparin and then Coumadin.  She was then sent to nursing home for rehabilitation.  She will therefore a month and then the nursing home wanted to use charge her insulin instead of discharging her insurance and therefore she left nursing home and went home.  After she went home Coumadin was changed to Eliquis 5 mg bid. \par \par She had called Dr. Dobbs's office for appointment.  However it seems the   wanted to get copies of records from her previous pulmonologist who is .  So far no appointment was given.\par \par She also saw Dr. Beard who wants to do colonoscopy.

## 2019-10-22 NOTE — PHYSICAL EXAM
[Normal Appearance] : normal appearance [Well Groomed] : well groomed [No Deformities] : no deformities [General Appearance - In No Acute Distress] : no acute distress [Normal Conjunctiva] : the conjunctiva exhibited no abnormalities [Eyelids - No Xanthelasma] : the eyelids demonstrated no xanthelasmas [Normal Oral Mucosa] : normal oral mucosa [No Oral Pallor] : no oral pallor [No Oral Cyanosis] : no oral cyanosis [Normal Jugular Venous A Waves Present] : normal jugular venous A waves present [Normal Jugular Venous V Waves Present] : normal jugular venous V waves present [No Jugular Venous [place] A Waves] : no jugular venous paris A waves [Respiration, Rhythm And Depth] : normal respiratory rhythm and effort [Exaggerated Use Of Accessory Muscles For Inspiration] : no accessory muscle use [Heart Rate And Rhythm] : heart rate and rhythm were normal [Auscultation Breath Sounds / Voice Sounds] : lungs were clear to auscultation bilaterally [Heart Sounds] : normal S1 and S2 [Systolic grade ___/6] : A grade [unfilled]/6 systolic murmur was heard. [Abdomen Soft] : soft [Abdomen Tenderness] : non-tender [Abnormal Walk] : normal gait [Abdomen Mass (___ Cm)] : no abdominal mass palpated [FreeTextEntry1] : trace edema [Skin Color & Pigmentation] : normal skin color and pigmentation [] : no rash [No Venous Stasis] : no venous stasis [Skin Lesions] : no skin lesions [No Skin Ulcers] : no skin ulcer [No Xanthoma] : no  xanthoma was observed [Mood] : the mood was normal [Affect] : the affect was normal [Oriented To Time, Place, And Person] : oriented to person, place, and time [No Anxiety] : not feeling anxious

## 2019-10-22 NOTE — REASON FOR VISIT
[Follow-Up - Clinic] : a clinic follow-up of [Hyperlipidemia] : hyperlipidemia [Hypertension] : hypertension [FreeTextEntry1] : Pulmonary embolus etc

## 2019-10-22 NOTE — DISCUSSION/SUMMARY
[FreeTextEntry1] : I didn't make any changes in her medications but encouraged her to follow-up with pulmonologist.  Medication list was reconciled.

## 2019-10-22 NOTE — ASSESSMENT
[FreeTextEntry1] : As she is doing well, I did not make any changes in her medications.  I encouraged her to follow-up with the pulmonologist and get evaluated for sleep apnea.

## 2019-11-07 ENCOUNTER — MESSAGE (OUTPATIENT)
Age: 64
End: 2019-11-07

## 2019-11-12 ENCOUNTER — APPOINTMENT (OUTPATIENT)
Dept: INTERNAL MEDICINE | Facility: CLINIC | Age: 64
End: 2019-11-12
Payer: SELF-PAY

## 2019-11-12 PROCEDURE — PCNS1: CPT

## 2019-12-17 ENCOUNTER — APPOINTMENT (OUTPATIENT)
Dept: CARDIOLOGY | Facility: CLINIC | Age: 64
End: 2019-12-17
Payer: MEDICARE

## 2019-12-17 ENCOUNTER — APPOINTMENT (OUTPATIENT)
Dept: INTERNAL MEDICINE | Facility: CLINIC | Age: 64
End: 2019-12-17

## 2019-12-17 VITALS — DIASTOLIC BLOOD PRESSURE: 80 MMHG | SYSTOLIC BLOOD PRESSURE: 120 MMHG

## 2019-12-17 DIAGNOSIS — M54.12 RADICULOPATHY, CERVICAL REGION: ICD-10-CM

## 2019-12-17 DIAGNOSIS — R26.9 UNSPECIFIED ABNORMALITIES OF GAIT AND MOBILITY: ICD-10-CM

## 2019-12-17 DIAGNOSIS — M19.90 UNSPECIFIED OSTEOARTHRITIS, UNSPECIFIED SITE: ICD-10-CM

## 2019-12-17 PROCEDURE — 99214 OFFICE O/P EST MOD 30 MIN: CPT

## 2019-12-17 NOTE — DISCUSSION/SUMMARY
[FreeTextEntry1] : I did not make any changes in medication but recommended that she get MRI of cervical spine.  If MRI is negative then I would to consider possibility of a stress test.  However the stress test is going to be fraught with artifacts due to obesity and large breast. Perhaps CT coronary angiogram might be better.\par \par Also told to double up on efforts at weight loss to help with her overall condition.  She could subsequently get plastic surgery for removal of pannus and the loose fat/skin.

## 2019-12-17 NOTE — HISTORY OF PRESENT ILLNESS
[FreeTextEntry1] : She complains of numbness in her fingers and recently was having pain in the right shoulder then going down the axilla across the chest into the left axilla and then into the left shoulder.  Movements of the neck performed now were associated with pain going from the neck towards the shoulder on both sides.\par \par The chest pain she had was similar to the pain she had with the movements of her neck.\par \par She had bariatric surgery 2 years ago.  From 603 pounds she has now come down to 289.  He continues to lose weight steadily.  During holiday time she gains couple of pounds.  She can only eat small amounts at a time otherwise she vomits.

## 2019-12-17 NOTE — ASSESSMENT
[FreeTextEntry1] : I believe her axilla, shoulder and chest pain is probably from cervical radiculopathy.  I do not think it is cardiac in nature.

## 2019-12-17 NOTE — REASON FOR VISIT
[Hyperlipidemia] : hyperlipidemia [Follow-Up - Clinic] : a clinic follow-up of [FreeTextEntry1] : Obesity, s/p bariatric surgery.  [Hypertension] : hypertension

## 2019-12-17 NOTE — PHYSICAL EXAM
[Normal Appearance] : normal appearance [Well Groomed] : well groomed [No Deformities] : no deformities [General Appearance - In No Acute Distress] : no acute distress [Normal Conjunctiva] : the conjunctiva exhibited no abnormalities [Normal Oral Mucosa] : normal oral mucosa [Eyelids - No Xanthelasma] : the eyelids demonstrated no xanthelasmas [No Oral Cyanosis] : no oral cyanosis [No Oral Pallor] : no oral pallor [Normal Jugular Venous A Waves Present] : normal jugular venous A waves present [Normal Jugular Venous V Waves Present] : normal jugular venous V waves present [No Jugular Venous Paris A Waves] : no jugular venous paris A waves [Exaggerated Use Of Accessory Muscles For Inspiration] : no accessory muscle use [Respiration, Rhythm And Depth] : normal respiratory rhythm and effort [Heart Rate And Rhythm] : heart rate and rhythm were normal [Auscultation Breath Sounds / Voice Sounds] : lungs were clear to auscultation bilaterally [Systolic grade ___/6] : A grade [unfilled]/6 systolic murmur was heard. [Abdomen Soft] : soft [Heart Sounds] : normal S1 and S2 [Abdomen Tenderness] : non-tender [Abnormal Walk] : normal gait [Abdomen Mass (___ Cm)] : no abdominal mass palpated [FreeTextEntry1] : trace edema [Skin Color & Pigmentation] : normal skin color and pigmentation [] : no rash [No Venous Stasis] : no venous stasis [Skin Lesions] : no skin lesions [No Xanthoma] : no  xanthoma was observed [No Skin Ulcers] : no skin ulcer [Oriented To Time, Place, And Person] : oriented to person, place, and time [Affect] : the affect was normal [No Anxiety] : not feeling anxious [Mood] : the mood was normal

## 2020-01-10 ENCOUNTER — MEDICATION RENEWAL (OUTPATIENT)
Age: 65
End: 2020-01-10

## 2020-01-24 ENCOUNTER — APPOINTMENT (OUTPATIENT)
Dept: INTERNAL MEDICINE | Facility: CLINIC | Age: 65
End: 2020-01-24
Payer: MEDICARE

## 2020-01-24 ENCOUNTER — LABORATORY RESULT (OUTPATIENT)
Age: 65
End: 2020-01-24

## 2020-01-24 VITALS
OXYGEN SATURATION: 100 % | SYSTOLIC BLOOD PRESSURE: 150 MMHG | TEMPERATURE: 97.9 F | DIASTOLIC BLOOD PRESSURE: 90 MMHG | RESPIRATION RATE: 18 BRPM | HEIGHT: 66 IN | HEART RATE: 73 BPM

## 2020-01-24 DIAGNOSIS — F51.04 PSYCHOPHYSIOLOGIC INSOMNIA: ICD-10-CM

## 2020-01-24 PROCEDURE — 99214 OFFICE O/P EST MOD 30 MIN: CPT | Mod: 25

## 2020-01-24 PROCEDURE — G0008: CPT

## 2020-01-24 PROCEDURE — 36415 COLL VENOUS BLD VENIPUNCTURE: CPT

## 2020-01-24 PROCEDURE — 90688 IIV4 VACCINE SPLT 0.5 ML IM: CPT

## 2020-01-24 NOTE — PLAN
[FreeTextEntry1] : - Continue with nocturnal supplemental O2\par - Pulmonary follow up for COPD and SONU\par - Trial of trazodone for insomnia\par - Restart amlodipine \par - Continue with all other treatments \par  - Labs done in office\par - Further management pending lab results \par - Short interval follow up for BP check

## 2020-01-24 NOTE — PHYSICAL EXAM
[No Acute Distress] : no acute distress [Well Nourished] : well nourished [Well Developed] : well developed [Well-Appearing] : well-appearing [No Respiratory Distress] : no respiratory distress  [No Accessory Muscle Use] : no accessory muscle use [Clear to Auscultation] : lungs were clear to auscultation bilaterally [Normal Rate] : normal rate  [Regular Rhythm] : with a regular rhythm [Normal S1, S2] : normal S1 and S2 [No Murmur] : no murmur heard [de-identified] : Obese

## 2020-01-24 NOTE — HEALTH RISK ASSESSMENT
[Yes] : Yes [Monthly or less (1 pt)] : Monthly or less (1 point) [1 or 2 (0 pts)] : 1 or 2 (0 points) [One fall no injury in past year] : Patient reported one fall in the past year without injury [1] : 2) Feeling down, depressed, or hopeless for several days (1) [] : No [de-identified] : cardioligst [Audit-CScore] : 1 [de-identified] : walking [de-identified] : low sodium,  [IGT0Aadkh] : 2

## 2020-01-24 NOTE — HISTORY OF PRESENT ILLNESS
[FreeTextEntry8] : Patient presents for evaluation of lethargy of several months in duration. She states that she has not felt well since her hospitalization in July of 2019. She also reports flatus. She reports insomnia. She states that her CPAP broke. She reports using supplemental oxygen at night and she has recurrent episodes of epistaxis. She states that she has not taken her amlodipine today.

## 2020-02-05 LAB
ALBUMIN SERPL ELPH-MCNC: 4.3 G/DL
ALP BLD-CCNC: 136 U/L
ALT SERPL-CCNC: 17 U/L
ANION GAP SERPL CALC-SCNC: 19 MMOL/L
AST SERPL-CCNC: 22 U/L
BASOPHILS # BLD AUTO: 0.01 K/UL
BASOPHILS NFR BLD AUTO: 0.1 %
BILIRUB SERPL-MCNC: 0.2 MG/DL
BUN SERPL-MCNC: 24 MG/DL
CALCIUM SERPL-MCNC: 9.5 MG/DL
CHLORIDE SERPL-SCNC: 111 MMOL/L
CO2 SERPL-SCNC: 15 MMOL/L
CREAT SERPL-MCNC: 1.99 MG/DL
EOSINOPHIL # BLD AUTO: 0.05 K/UL
EOSINOPHIL NFR BLD AUTO: 0.6 %
FERRITIN SERPL-MCNC: 315 NG/ML
FOLATE SERPL-MCNC: 10 NG/ML
GLUCOSE SERPL-MCNC: 95 MG/DL
HCT VFR BLD CALC: 31.3 %
HGB BLD-MCNC: 9.6 G/DL
IMM GRANULOCYTES NFR BLD AUTO: 0.5 %
IRON SATN MFR SERPL: 32 %
IRON SERPL-MCNC: 98 UG/DL
LYMPHOCYTES # BLD AUTO: 2.03 K/UL
LYMPHOCYTES NFR BLD AUTO: 24.6 %
MAGNESIUM SERPL-MCNC: 2 MG/DL
MAN DIFF?: NORMAL
MCHC RBC-ENTMCNC: 30.7 GM/DL
MCHC RBC-ENTMCNC: 31.8 PG
MCV RBC AUTO: 103.6 FL
MONOCYTES # BLD AUTO: 0.48 K/UL
MONOCYTES NFR BLD AUTO: 5.8 %
NEUTROPHILS # BLD AUTO: 5.63 K/UL
NEUTROPHILS NFR BLD AUTO: 68.4 %
PLATELET # BLD AUTO: 264 K/UL
POTASSIUM SERPL-SCNC: 4.7 MMOL/L
PROT SERPL-MCNC: 7.4 G/DL
RBC # BLD: 3.02 M/UL
RBC # FLD: 16.1 %
SODIUM SERPL-SCNC: 145 MMOL/L
TIBC SERPL-MCNC: 310 UG/DL
TRANSFERRIN SERPL-MCNC: 231 MG/DL
TSH SERPL-ACNC: 6.12 UIU/ML
UIBC SERPL-MCNC: 212 UG/DL
VIT B12 SERPL-MCNC: 871 PG/ML
WBC # FLD AUTO: 8.24 K/UL

## 2020-02-26 ENCOUNTER — APPOINTMENT (OUTPATIENT)
Dept: INTERNAL MEDICINE | Facility: CLINIC | Age: 65
End: 2020-02-26

## 2020-03-04 DIAGNOSIS — R53.1 WEAKNESS: ICD-10-CM

## 2020-03-04 DIAGNOSIS — R92.8 OTHER ABNORMAL AND INCONCLUSIVE FINDINGS ON DIAGNOSTIC IMAGING OF BREAST: ICD-10-CM

## 2020-03-10 ENCOUNTER — APPOINTMENT (OUTPATIENT)
Dept: INTERNAL MEDICINE | Facility: CLINIC | Age: 65
End: 2020-03-10

## 2020-05-11 ENCOUNTER — APPOINTMENT (OUTPATIENT)
Dept: INTERNAL MEDICINE | Facility: CLINIC | Age: 65
End: 2020-05-11
Payer: MEDICARE

## 2020-05-11 DIAGNOSIS — J30.2 OTHER SEASONAL ALLERGIC RHINITIS: ICD-10-CM

## 2020-05-11 PROCEDURE — 99441: CPT

## 2020-05-14 ENCOUNTER — RX RENEWAL (OUTPATIENT)
Age: 65
End: 2020-05-14

## 2020-06-01 ENCOUNTER — RX RENEWAL (OUTPATIENT)
Age: 65
End: 2020-06-01

## 2020-07-10 ENCOUNTER — RX RENEWAL (OUTPATIENT)
Age: 65
End: 2020-07-10

## 2020-08-11 ENCOUNTER — APPOINTMENT (OUTPATIENT)
Dept: CARDIOLOGY | Facility: CLINIC | Age: 65
End: 2020-08-11

## 2020-08-12 RX ORDER — ELECTROLYTES/DEXTROSE
31G X 5 MM SOLUTION, ORAL ORAL
Qty: 1 | Refills: 0 | Status: DISCONTINUED | COMMUNITY
Start: 2020-01-24 | End: 2020-08-12

## 2020-10-12 ENCOUNTER — RX RENEWAL (OUTPATIENT)
Age: 65
End: 2020-10-12

## 2020-10-19 ENCOUNTER — APPOINTMENT (OUTPATIENT)
Dept: INTERNAL MEDICINE | Facility: CLINIC | Age: 65
End: 2020-10-19
Payer: MEDICARE

## 2020-10-19 VITALS
HEART RATE: 92 BPM | RESPIRATION RATE: 16 BRPM | WEIGHT: 288 LBS | OXYGEN SATURATION: 97 % | BODY MASS INDEX: 46.28 KG/M2 | TEMPERATURE: 97.4 F | DIASTOLIC BLOOD PRESSURE: 76 MMHG | SYSTOLIC BLOOD PRESSURE: 142 MMHG | HEIGHT: 66 IN

## 2020-10-19 DIAGNOSIS — E53.8 DEFICIENCY OF OTHER SPECIFIED B GROUP VITAMINS: ICD-10-CM

## 2020-10-19 DIAGNOSIS — Z97.5 PRESENCE OF (INTRAUTERINE) CONTRACEPTIVE DEVICE: ICD-10-CM

## 2020-10-19 DIAGNOSIS — E55.9 VITAMIN D DEFICIENCY, UNSPECIFIED: ICD-10-CM

## 2020-10-19 PROCEDURE — 99214 OFFICE O/P EST MOD 30 MIN: CPT | Mod: 25

## 2020-10-19 PROCEDURE — 36415 COLL VENOUS BLD VENIPUNCTURE: CPT

## 2020-10-19 PROCEDURE — 99072 ADDL SUPL MATRL&STAF TM PHE: CPT

## 2020-10-19 NOTE — HEALTH RISK ASSESSMENT
[Yes] : Yes [2 - 3 times a week (3 pts)] : 2 - 3  times a week (3 points) [1 or 2 (0 pts)] : 1 or 2 (0 points) [Never (0 pts)] : Never (0 points) [No] : In the past 12 months have you used drugs other than those required for medical reasons? No [Any fall with injury in past year] : Patient reported fall with injury in the past year [1] : 1) Little interest or pleasure doing things for several days (1) [3] : 2) Feeling down, depressed, or hopeless for nearly every day (3) [] : No [Audit-CScore] : 3 [de-identified] : No [de-identified] : None [de-identified] : low sodium [[id number]] : 4

## 2020-10-19 NOTE — ASSESSMENT
[FreeTextEntry1] : 65 year old female with multiple medical comorbidities now with elevated BP secondary to non-compliance with prescribed medical therapy.

## 2020-10-19 NOTE — PHYSICAL EXAM
[No Acute Distress] : no acute distress [Well Nourished] : well nourished [Well Developed] : well developed [Well-Appearing] : well-appearing [No Respiratory Distress] : no respiratory distress  [No Accessory Muscle Use] : no accessory muscle use [Clear to Auscultation] : lungs were clear to auscultation bilaterally [Normal Rate] : normal rate  [Regular Rhythm] : with a regular rhythm [Normal S1, S2] : normal S1 and S2 [No Murmur] : no murmur heard [Non Tender] : non-tender [Soft] : abdomen soft [Normal Affect] : the affect was normal [Normal Bowel Sounds] : normal bowel sounds [Speech Grossly Normal] : speech grossly normal [Normal Insight/Judgement] : insight and judgment were intact [Normal Mood] : the mood was normal [de-identified] : Obese [de-identified] : Patient wearing protective face mask  [de-identified] : BLE trace +1 non-pitting edema

## 2020-10-19 NOTE — HISTORY OF PRESENT ILLNESS
[de-identified] : Pt. presents for f/u for chronic medical conditions.Pt. reports she has been unable to reach her pulmonologist and her CPAP machine is still broken. Reports wearing oxygen during the day and at night. She reports intermittent compliance with levothyroxine dosing. She did not take her antihypertensive medications this morning. No other complaints at present.  [FreeTextEntry1] : Pt. presents for f/u for chronic medical conditions.

## 2020-10-19 NOTE — PLAN
[FreeTextEntry1] : Pulmonologist referral \par  - Labs done in office\par - Further management pending lab results \par - Will repeat TFTs in 4-6 weeks.

## 2020-10-20 ENCOUNTER — LABORATORY RESULT (OUTPATIENT)
Age: 65
End: 2020-10-20

## 2020-11-02 DIAGNOSIS — F32.1 MAJOR DEPRESSIVE DISORDER, SINGLE EPISODE, MODERATE: ICD-10-CM

## 2020-11-02 LAB
25(OH)D3 SERPL-MCNC: 24.4 NG/ML
ALBUMIN SERPL ELPH-MCNC: 4.1 G/DL
ALP BLD-CCNC: 134 U/L
ALT SERPL-CCNC: 9 U/L
ANION GAP SERPL CALC-SCNC: 14 MMOL/L
AST SERPL-CCNC: 21 U/L
BASOPHILS # BLD AUTO: 0.01 K/UL
BASOPHILS NFR BLD AUTO: 0.2 %
BILIRUB SERPL-MCNC: 0.4 MG/DL
BUN SERPL-MCNC: 28 MG/DL
CALCIUM SERPL-MCNC: 9.3 MG/DL
CHLORIDE SERPL-SCNC: 112 MMOL/L
CHOLEST SERPL-MCNC: 230 MG/DL
CO2 SERPL-SCNC: 18 MMOL/L
CREAT SERPL-MCNC: 2.02 MG/DL
EOSINOPHIL # BLD AUTO: 0.04 K/UL
EOSINOPHIL NFR BLD AUTO: 0.7 %
ESTIMATED AVERAGE GLUCOSE: 100 MG/DL
FERRITIN SERPL-MCNC: 109 NG/ML
FOLATE SERPL-MCNC: 14.6 NG/ML
GLUCOSE SERPL-MCNC: 103 MG/DL
HBA1C MFR BLD HPLC: 5.1 %
HCT VFR BLD CALC: 35.9 %
HDLC SERPL-MCNC: 100 MG/DL
HGB BLD-MCNC: 10.2 G/DL
IMM GRANULOCYTES NFR BLD AUTO: 0.2 %
IRON SATN MFR SERPL: 70 %
IRON SERPL-MCNC: 222 UG/DL
LDLC SERPL CALC-MCNC: 106 MG/DL
LYMPHOCYTES # BLD AUTO: 1.52 K/UL
LYMPHOCYTES NFR BLD AUTO: 26.1 %
MAGNESIUM SERPL-MCNC: 2.1 MG/DL
MAN DIFF?: NORMAL
MCHC RBC-ENTMCNC: 28.4 GM/DL
MCHC RBC-ENTMCNC: 31.9 PG
MCV RBC AUTO: 112.2 FL
METHYLMALONATE SERPL-SCNC: 222 NMOL/L
MONOCYTES # BLD AUTO: 0.37 K/UL
MONOCYTES NFR BLD AUTO: 6.3 %
NEUTROPHILS # BLD AUTO: 3.88 K/UL
NEUTROPHILS NFR BLD AUTO: 66.5 %
NONHDLC SERPL-MCNC: 130 MG/DL
PLATELET # BLD AUTO: 216 K/UL
POTASSIUM SERPL-SCNC: 4.7 MMOL/L
PROT SERPL-MCNC: 7.2 G/DL
RBC # BLD: 3.2 M/UL
RBC # FLD: 15 %
SODIUM SERPL-SCNC: 144 MMOL/L
TIBC SERPL-MCNC: 318 UG/DL
TRANSFERRIN SERPL-MCNC: 267 MG/DL
TRIGL SERPL-MCNC: 118 MG/DL
TSH SERPL-ACNC: 4.59 UIU/ML
UIBC SERPL-MCNC: 96 UG/DL
VIT B12 SERPL-MCNC: >2000 PG/ML
WBC # FLD AUTO: 5.83 K/UL

## 2020-11-03 ENCOUNTER — NON-APPOINTMENT (OUTPATIENT)
Age: 65
End: 2020-11-03

## 2020-11-03 DIAGNOSIS — F19.10 OTHER PSYCHOACTIVE SUBSTANCE ABUSE, UNCOMPLICATED: ICD-10-CM

## 2020-11-18 ENCOUNTER — APPOINTMENT (OUTPATIENT)
Dept: INTERNAL MEDICINE | Facility: CLINIC | Age: 65
End: 2020-11-18

## 2021-01-07 ENCOUNTER — RX RENEWAL (OUTPATIENT)
Age: 66
End: 2021-01-07

## 2021-01-11 ENCOUNTER — LABORATORY RESULT (OUTPATIENT)
Age: 66
End: 2021-01-11

## 2021-01-11 ENCOUNTER — APPOINTMENT (OUTPATIENT)
Dept: INTERNAL MEDICINE | Facility: CLINIC | Age: 66
End: 2021-01-11
Payer: MEDICARE

## 2021-01-11 VITALS
DIASTOLIC BLOOD PRESSURE: 65 MMHG | BODY MASS INDEX: 47.09 KG/M2 | HEART RATE: 82 BPM | TEMPERATURE: 97.2 F | SYSTOLIC BLOOD PRESSURE: 118 MMHG | HEIGHT: 66 IN | OXYGEN SATURATION: 97 % | WEIGHT: 293 LBS

## 2021-01-11 DIAGNOSIS — Z23 ENCOUNTER FOR IMMUNIZATION: ICD-10-CM

## 2021-01-11 PROCEDURE — 99214 OFFICE O/P EST MOD 30 MIN: CPT | Mod: 25

## 2021-01-11 PROCEDURE — 36415 COLL VENOUS BLD VENIPUNCTURE: CPT

## 2021-01-11 PROCEDURE — 99072 ADDL SUPL MATRL&STAF TM PHE: CPT

## 2021-01-11 RX ORDER — AMLODIPINE BESYLATE 5 MG/1
5 TABLET ORAL DAILY
Qty: 90 | Refills: 1 | Status: DISCONTINUED | COMMUNITY
Start: 2019-08-30 | End: 2021-01-11

## 2021-01-11 NOTE — ASSESSMENT
[FreeTextEntry1] : - MAUREEN PEÑA with multiple comorbidities now clinically stable\par - further recommendations pending labs\par - continue current medications\par \par \par htn- bp stable off meds\par will follow

## 2021-01-11 NOTE — HISTORY OF PRESENT ILLNESS
[FreeTextEntry1] : pt here for establish care with a  new physician\par  [de-identified] : pt here for follow up\par would like a medication reconciliation\par \par not taking amlodipine and bp on low side\par will cont off the med for now\par will reassess\par

## 2021-01-15 LAB
ALBUMIN SERPL ELPH-MCNC: 4.2 G/DL
ALP BLD-CCNC: 148 U/L
ALT SERPL-CCNC: 11 U/L
ANION GAP SERPL CALC-SCNC: 12 MMOL/L
AST SERPL-CCNC: 19 U/L
BASOPHILS # BLD AUTO: 0.01 K/UL
BASOPHILS NFR BLD AUTO: 0.1 %
BILIRUB SERPL-MCNC: 0.2 MG/DL
BUN SERPL-MCNC: 22 MG/DL
CALCIUM SERPL-MCNC: 8.8 MG/DL
CHLORIDE SERPL-SCNC: 111 MMOL/L
CHOLEST SERPL-MCNC: 251 MG/DL
CO2 SERPL-SCNC: 15 MMOL/L
CREAT SERPL-MCNC: 1.93 MG/DL
EOSINOPHIL # BLD AUTO: 0.05 K/UL
EOSINOPHIL NFR BLD AUTO: 0.7 %
FOLATE SERPL-MCNC: 5.2 NG/ML
GLUCOSE SERPL-MCNC: 111 MG/DL
HCT VFR BLD CALC: 33.7 %
HDLC SERPL-MCNC: 110 MG/DL
HGB BLD-MCNC: 10.3 G/DL
IMM GRANULOCYTES NFR BLD AUTO: 0.4 %
LDLC SERPL CALC-MCNC: 97 MG/DL
LYMPHOCYTES # BLD AUTO: 1.41 K/UL
LYMPHOCYTES NFR BLD AUTO: 19.2 %
MAN DIFF?: NORMAL
MCHC RBC-ENTMCNC: 30.6 GM/DL
MCHC RBC-ENTMCNC: 33.9 PG
MCV RBC AUTO: 110.9 FL
MONOCYTES # BLD AUTO: 0.35 K/UL
MONOCYTES NFR BLD AUTO: 4.8 %
NEUTROPHILS # BLD AUTO: 5.5 K/UL
NEUTROPHILS NFR BLD AUTO: 74.8 %
NONHDLC SERPL-MCNC: 141 MG/DL
PLATELET # BLD AUTO: 220 K/UL
POTASSIUM SERPL-SCNC: 4.6 MMOL/L
PROT SERPL-MCNC: 7.3 G/DL
RBC # BLD: 3.04 M/UL
RBC # FLD: 17 %
SODIUM SERPL-SCNC: 139 MMOL/L
TRIGL SERPL-MCNC: 220 MG/DL
TSH SERPL-ACNC: 4.22 UIU/ML
VIT B12 SERPL-MCNC: 401 PG/ML
WBC # FLD AUTO: 7.35 K/UL

## 2021-01-26 PROBLEM — Z23 NEED FOR SHINGLES VACCINE: Status: ACTIVE | Noted: 2021-01-26

## 2021-01-26 RX ORDER — ZOSTER VACCINE RECOMBINANT, ADJUVANTED 50 MCG/0.5
50 KIT INTRAMUSCULAR ONCE
Qty: 1 | Refills: 1 | Status: COMPLETED | COMMUNITY
Start: 2021-01-11 | End: 2021-01-26

## 2021-02-10 RX ORDER — SOFT LENS DISINFECTANT
SOLUTION, NON-ORAL MISCELLANEOUS
Qty: 1 | Refills: 0 | Status: ACTIVE | COMMUNITY
Start: 2021-02-08 | End: 1900-01-01

## 2021-03-15 ENCOUNTER — APPOINTMENT (OUTPATIENT)
Dept: INTERNAL MEDICINE | Facility: CLINIC | Age: 66
End: 2021-03-15
Payer: MEDICARE

## 2021-03-15 VITALS
TEMPERATURE: 96.7 F | WEIGHT: 293 LBS | OXYGEN SATURATION: 83 % | BODY MASS INDEX: 47.09 KG/M2 | HEART RATE: 85 BPM | HEIGHT: 66 IN | SYSTOLIC BLOOD PRESSURE: 134 MMHG | DIASTOLIC BLOOD PRESSURE: 83 MMHG

## 2021-03-15 PROCEDURE — 99072 ADDL SUPL MATRL&STAF TM PHE: CPT

## 2021-03-15 PROCEDURE — 99214 OFFICE O/P EST MOD 30 MIN: CPT

## 2021-03-15 NOTE — PHYSICAL EXAM
[No Acute Distress] : no acute distress [No Respiratory Distress] : no respiratory distress  [No Accessory Muscle Use] : no accessory muscle use [Clear to Auscultation] : lungs were clear to auscultation bilaterally [Normal Rate] : normal rate  [Regular Rhythm] : with a regular rhythm [Normal S1, S2] : normal S1 and S2 [Normal Affect] : the affect was normal [Alert and Oriented x3] : oriented to person, place, and time [de-identified] : using a wheelchair

## 2021-03-15 NOTE — HISTORY OF PRESENT ILLNESS
[Other: _____] : [unfilled] [de-identified] : 66 yo F presenting to follow up chronic conditions. She states she is normally compliant with meds but she has not taken some as they were prescribed to the retail pharmacy when they should have been prescribed to her mail order. She would like some blood work to check on her kidney, liver and gout. Otherwise, she feels well and is stable.

## 2021-03-15 NOTE — HEALTH RISK ASSESSMENT
[Yes] : Yes [2 - 4 times a month (2 pts)] : 2-4 times a month (2 points) [3 or 4 (1 pt)] : 3 or 4  (1 point) [Less than monthly (1 pt)] : Less than monthly (1 point) [No] : In the past 12 months have you used drugs other than those required for medical reasons? No [No falls in past year] : Patient reported no falls in the past year [0] : 2) Feeling down, depressed, or hopeless: Not at all (0) [] : No [Audit-CScore] : 4 [de-identified] : Alone [de-identified] : Low Salt [AOG1Yibpe] : 0

## 2021-03-16 LAB — URATE SERPL-MCNC: 10.9 MG/DL

## 2021-03-17 LAB
ALBUMIN SERPL ELPH-MCNC: 4.2 G/DL
ALP BLD-CCNC: 141 U/L
ALT SERPL-CCNC: 8 U/L
ANION GAP SERPL CALC-SCNC: 13 MMOL/L
AST SERPL-CCNC: 19 U/L
BILIRUB SERPL-MCNC: 0.3 MG/DL
BUN SERPL-MCNC: 23 MG/DL
CALCIUM SERPL-MCNC: 9.2 MG/DL
CHLORIDE SERPL-SCNC: 109 MMOL/L
CO2 SERPL-SCNC: 15 MMOL/L
CREAT SERPL-MCNC: 2.11 MG/DL
GLUCOSE SERPL-MCNC: 109 MG/DL
POTASSIUM SERPL-SCNC: 4.3 MMOL/L
PROT SERPL-MCNC: 7.6 G/DL
SODIUM SERPL-SCNC: 137 MMOL/L

## 2021-03-23 ENCOUNTER — APPOINTMENT (OUTPATIENT)
Dept: PULMONOLOGY | Facility: CLINIC | Age: 66
End: 2021-03-23
Payer: MEDICARE

## 2021-03-23 VITALS
SYSTOLIC BLOOD PRESSURE: 117 MMHG | TEMPERATURE: 98.5 F | HEIGHT: 67 IN | OXYGEN SATURATION: 95 % | WEIGHT: 280 LBS | HEART RATE: 93 BPM | BODY MASS INDEX: 43.95 KG/M2 | DIASTOLIC BLOOD PRESSURE: 71 MMHG

## 2021-03-23 DIAGNOSIS — Z82.5 FAMILY HISTORY OF ASTHMA AND OTHER CHRONIC LOWER RESPIRATORY DISEASES: ICD-10-CM

## 2021-03-23 DIAGNOSIS — Z87.898 PERSONAL HISTORY OF OTHER SPECIFIED CONDITIONS: ICD-10-CM

## 2021-03-23 PROCEDURE — 71046 X-RAY EXAM CHEST 2 VIEWS: CPT

## 2021-03-23 PROCEDURE — 99203 OFFICE O/P NEW LOW 30 MIN: CPT | Mod: 25

## 2021-03-23 PROCEDURE — 99072 ADDL SUPL MATRL&STAF TM PHE: CPT

## 2021-03-23 RX ORDER — CHLORHEXIDINE GLUCONATE 4 %
325 (65 FE) LIQUID (ML) TOPICAL TWICE DAILY
Qty: 180 | Refills: 2 | Status: DISCONTINUED | COMMUNITY
Start: 2019-09-16 | End: 2021-03-23

## 2021-03-24 LAB — SARS-COV-2 N GENE NPH QL NAA+PROBE: NOT DETECTED

## 2021-03-24 NOTE — HISTORY OF PRESENT ILLNESS
[Never] : never [TextBox_4] : MAUREEN PEÑA is a 65 year old female who presents from Dr Davalos office\par \par 1) SONU\par initially diagnosed a few years ago, was on CPAP? now off it because machine broke\par s/p gastric sleeve surgery - about2 years ago-  has lost 400lbs since surgery\par SLEEP: bedtime is variable. does not stay asleep. wakes up variable times.\par no caffeine- no coffee/ no soda/ tea\par she is snoring, waking herself up from snoring. son hears her gasping for air. can talk in her sleep. no dream recall.\par trazadone for sleep onset insomnia\par \par \par 2) history of 'respiratory failure" - happed winter 2000, went to a [facility]\par reportedly intubated 3x?  was iN NH for 6 years\par since 2006 living on your own\par has been on 02 since then.  now on trelegy for COPD\par remote of history of pfts\par \par 3) DVT- diagnosed 2 years ago \par on eliquis. no bleeding issues\par reportedly no PE\par diagnosed in [facility]\par \par \par 4) did not get covid vaccine

## 2021-03-24 NOTE — PHYSICAL EXAM
[No Acute Distress] : no acute distress [Well Nourished] : well nourished [Well Developed] : well developed [IV] : Mallampati Class: IV [Normal Rate/Rhythm] : normal rate/rhythm [Normal S1, S2] : normal s1, s2 [No Resp Distress] : no resp distress [No Acc Muscle Use] : no acc muscle use [Clear to Auscultation Bilaterally] : clear to auscultation bilaterally [No Edema] : no edema [No Focal Deficits] : no focal deficits [TextBox_11] : macroglossia with tongue ridges [TextBox_89] : obese [TextBox_132] : ambulates with walker

## 2021-03-24 NOTE — ASSESSMENT
[FreeTextEntry1] : 1) SONU reportedly\par chart review- PSG negative\par will repeat PSG/ split night study\par \par 2) COPD?\par covid swab today\par she will return for formal pfts\par never smoker- but history of second hand exposure and resp failure, unclear etiology?\par will eval with pfts \par \par 3) DVT\par noted on EMR she has a DVT in2 018\par she has been on eliquis\par no bleeding issues\par ckd- unable to do CTA\par \par 4) after the sleep study, pfts , would probably obtain TTE eval for pulm HTN/\par \par f/u with me after formal pfts & sleep study\par all qx answered\par

## 2021-03-30 ENCOUNTER — APPOINTMENT (OUTPATIENT)
Dept: CARDIOLOGY | Facility: CLINIC | Age: 66
End: 2021-03-30
Payer: MEDICARE

## 2021-03-30 ENCOUNTER — NON-APPOINTMENT (OUTPATIENT)
Age: 66
End: 2021-03-30

## 2021-03-30 VITALS — HEART RATE: 100 BPM | WEIGHT: 289 LBS | HEIGHT: 67 IN | BODY MASS INDEX: 45.36 KG/M2 | OXYGEN SATURATION: 95 %

## 2021-03-30 VITALS — SYSTOLIC BLOOD PRESSURE: 142 MMHG | DIASTOLIC BLOOD PRESSURE: 90 MMHG

## 2021-03-30 DIAGNOSIS — I95.1 ORTHOSTATIC HYPOTENSION: ICD-10-CM

## 2021-03-30 PROCEDURE — 93000 ELECTROCARDIOGRAM COMPLETE: CPT

## 2021-03-30 PROCEDURE — 99214 OFFICE O/P EST MOD 30 MIN: CPT

## 2021-03-30 PROCEDURE — 99072 ADDL SUPL MATRL&STAF TM PHE: CPT

## 2021-03-30 NOTE — HISTORY OF PRESENT ILLNESS
[FreeTextEntry1] : She does not feel good.  She has been feeling dizzy.  She feels lightheaded and also as though she is spinning herself.  She gets dizzy when she sits up or stands up or walks.  Sometimes she has to hold onto something.\par \par She had developed VY and some of her medication had been decreased.\par \par During the pandemic her sleep apnea machine broke.  Due to the pandemic  sleep study was not done and she could not get a new machine.  She is now seeing pulmonologist , who is setting up a repeat sleep study.\par \par She gets hip pain.  She is also having some visual problem.  She is seeing things that are not there.  Sometimes she sees double.  She has an appointment to see the ophthalmologist.\par \par Blood test done recently showed absence of any COVID-19 infection.  Sodium was 137, chloride 109, glucose 109, BUN 23 and creatinine 2.11.  Estimated GFR was 98.  Recasted level was 10.9 and hemoglobin is 10.3 and hematocrit 33.7.  Triglycerides 220, total cholesterol 251,  and LDL 97 and non-.

## 2021-03-30 NOTE — REASON FOR VISIT
[Dizziness] : dizziness [Hyperlipidemia] : hyperlipidemia [Hypertension] : hypertension [FreeTextEntry1] : Obesity, SONU arthritis etc

## 2021-03-30 NOTE — DISCUSSION/SUMMARY
[FreeTextEntry1] : On a trial basis I gave her meclizine 25 mg 3 times daily to see if it will help with the spinning sensation and dizziness.\par \par I also strongly recommended low-salt diet and to follow-up with bariatric surgery to see if there is any revision that is recommended for additional weight loss or whether she can be set up with their nutritionist.\par \par I recommended that she follow-up with pulmonologist to get the sleep study and also with ophthalmologist to get eyes evaluated.\par \par Previously her blood pressure was good.  Maybe it is high now because of increased sodium intake.  I told to cut out salt from her diet.  I did not make any changes in the BP medication.  If her blood pressure remains elevated during the next visit then I will adjust her medications.

## 2021-03-30 NOTE — PHYSICAL EXAM
[Normal Appearance] : normal appearance [Well Groomed] : well groomed [No Deformities] : no deformities [General Appearance - In No Acute Distress] : no acute distress [Normal Conjunctiva] : the conjunctiva exhibited no abnormalities [Eyelids - No Xanthelasma] : the eyelids demonstrated no xanthelasmas [Normal Oral Mucosa] : normal oral mucosa [No Oral Pallor] : no oral pallor [No Oral Cyanosis] : no oral cyanosis [Normal Jugular Venous A Waves Present] : normal jugular venous A waves present [Normal Jugular Venous V Waves Present] : normal jugular venous V waves present [No Jugular Venous Paris A Waves] : no jugular venous paris A waves [Respiration, Rhythm And Depth] : normal respiratory rhythm and effort [Exaggerated Use Of Accessory Muscles For Inspiration] : no accessory muscle use [Auscultation Breath Sounds / Voice Sounds] : lungs were clear to auscultation bilaterally [Heart Rate And Rhythm] : heart rate and rhythm were normal [Heart Sounds] : normal S1 and S2 [Systolic grade ___/6] : A grade [unfilled]/6 systolic murmur was heard. [Abdomen Soft] : soft [Abdomen Tenderness] : non-tender [Abdomen Mass (___ Cm)] : no abdominal mass palpated [Abnormal Walk] : normal gait [FreeTextEntry1] : trace edema [Skin Color & Pigmentation] : normal skin color and pigmentation [] : no rash [No Venous Stasis] : no venous stasis [Skin Lesions] : no skin lesions [No Skin Ulcers] : no skin ulcer [No Xanthoma] : no  xanthoma was observed [Oriented To Time, Place, And Person] : oriented to person, place, and time [Affect] : the affect was normal [Mood] : the mood was normal [No Anxiety] : not feeling anxious

## 2021-03-30 NOTE — ASSESSMENT
[FreeTextEntry1] : Her blood pressure is slightly high.  It may be something to do with high sodium in her diet as well as untreated sleep apnea.  She needs to lose weight.\par \par I told her to follow-up with the bariatric surgeon to see if they need to do any revision or whether she can be set up with their nutritionist to help her lose weight.  It 11+ impact on her arthritis and sleep apnea and will blood pressure etc.\par \par She also needs to see an ophthalmologist to rule out eye problems as a cause of her visual disturbances.  If eye checkup is negative then that etiology will have to be pursued.\par \par Part of her dizziness is's a sensation of her spinning.  There is no associated nausea.\par \par She has history of postural hypotension.  Currently she is on Eliquis.
(0) independent

## 2021-04-02 ENCOUNTER — APPOINTMENT (OUTPATIENT)
Dept: PULMONOLOGY | Facility: CLINIC | Age: 66
End: 2021-04-02

## 2021-04-04 LAB — SARS-COV-2 N GENE NPH QL NAA+PROBE: NOT DETECTED

## 2021-04-07 ENCOUNTER — APPOINTMENT (OUTPATIENT)
Dept: PULMONOLOGY | Facility: CLINIC | Age: 66
End: 2021-04-07
Payer: MEDICARE

## 2021-04-07 VITALS
HEART RATE: 75 BPM | SYSTOLIC BLOOD PRESSURE: 127 MMHG | TEMPERATURE: 97.7 F | DIASTOLIC BLOOD PRESSURE: 79 MMHG | OXYGEN SATURATION: 100 %

## 2021-04-07 PROCEDURE — 99072 ADDL SUPL MATRL&STAF TM PHE: CPT

## 2021-04-07 PROCEDURE — 94729 DIFFUSING CAPACITY: CPT

## 2021-04-07 PROCEDURE — 94010 BREATHING CAPACITY TEST: CPT

## 2021-04-07 PROCEDURE — 94727 GAS DIL/WSHOT DETER LNG VOL: CPT

## 2021-05-03 ENCOUNTER — RX RENEWAL (OUTPATIENT)
Age: 66
End: 2021-05-03

## 2021-05-04 ENCOUNTER — APPOINTMENT (OUTPATIENT)
Dept: CARDIOLOGY | Facility: CLINIC | Age: 66
End: 2021-05-04

## 2021-06-11 ENCOUNTER — NON-APPOINTMENT (OUTPATIENT)
Age: 66
End: 2021-06-11

## 2021-06-14 ENCOUNTER — APPOINTMENT (OUTPATIENT)
Dept: INTERNAL MEDICINE | Facility: CLINIC | Age: 66
End: 2021-06-14
Payer: MEDICARE

## 2021-06-14 VITALS
DIASTOLIC BLOOD PRESSURE: 74 MMHG | BODY MASS INDEX: 45.99 KG/M2 | OXYGEN SATURATION: 91 % | SYSTOLIC BLOOD PRESSURE: 136 MMHG | TEMPERATURE: 97.2 F | WEIGHT: 293 LBS | HEIGHT: 67 IN | HEART RATE: 73 BPM

## 2021-06-14 PROCEDURE — 99072 ADDL SUPL MATRL&STAF TM PHE: CPT

## 2021-06-14 PROCEDURE — 99215 OFFICE O/P EST HI 40 MIN: CPT

## 2021-06-25 ENCOUNTER — APPOINTMENT (OUTPATIENT)
Dept: INTERNAL MEDICINE | Facility: CLINIC | Age: 66
End: 2021-06-25
Payer: MEDICARE

## 2021-06-25 PROCEDURE — 99072 ADDL SUPL MATRL&STAF TM PHE: CPT

## 2021-06-25 PROCEDURE — 99212 OFFICE O/P EST SF 10 MIN: CPT

## 2021-06-28 LAB
ALBUMIN SERPL ELPH-MCNC: 4.3 G/DL
ALP BLD-CCNC: 178 U/L
ALT SERPL-CCNC: 10 U/L
ANION GAP SERPL CALC-SCNC: 15 MMOL/L
AST SERPL-CCNC: 19 U/L
BILIRUB SERPL-MCNC: 0.3 MG/DL
BUN SERPL-MCNC: 27 MG/DL
CALCIUM SERPL-MCNC: 8.8 MG/DL
CHLORIDE SERPL-SCNC: 114 MMOL/L
CO2 SERPL-SCNC: 12 MMOL/L
CREAT SERPL-MCNC: 2.48 MG/DL
GLUCOSE SERPL-MCNC: 85 MG/DL
POTASSIUM SERPL-SCNC: 4.4 MMOL/L
PROT SERPL-MCNC: 7.6 G/DL
SODIUM SERPL-SCNC: 141 MMOL/L

## 2021-07-07 ENCOUNTER — NON-APPOINTMENT (OUTPATIENT)
Age: 66
End: 2021-07-07

## 2021-07-16 ENCOUNTER — APPOINTMENT (OUTPATIENT)
Dept: INTERNAL MEDICINE | Facility: CLINIC | Age: 66
End: 2021-07-16
Payer: MEDICARE

## 2021-07-16 VITALS
HEART RATE: 105 BPM | BODY MASS INDEX: 45.99 KG/M2 | OXYGEN SATURATION: 98 % | TEMPERATURE: 97.2 F | SYSTOLIC BLOOD PRESSURE: 108 MMHG | WEIGHT: 293 LBS | HEIGHT: 67 IN | DIASTOLIC BLOOD PRESSURE: 72 MMHG

## 2021-07-16 DIAGNOSIS — I49.9 CARDIAC ARRHYTHMIA, UNSPECIFIED: ICD-10-CM

## 2021-07-16 PROCEDURE — 99215 OFFICE O/P EST HI 40 MIN: CPT

## 2021-07-16 PROCEDURE — 99072 ADDL SUPL MATRL&STAF TM PHE: CPT

## 2021-07-17 PROBLEM — I49.9 ABNORMAL HEART RHYTHM: Status: ACTIVE | Noted: 2021-07-16

## 2021-07-17 NOTE — PHYSICAL EXAM
[No Acute Distress] : no acute distress [Well Developed] : well developed [Well-Appearing] : well-appearing [No Respiratory Distress] : no respiratory distress  [Soft] : abdomen soft [Non-distended] : non-distended [Normal Affect] : the affect was normal [Alert and Oriented x3] : oriented to person, place, and time [de-identified] : abnormal rhythm [de-identified] : uses a wheelchair

## 2021-07-17 NOTE — HEALTH RISK ASSESSMENT
[Yes] : Yes [2 - 4 times a month (2 pts)] : 2-4 times a month (2 points) [3 or 4 (1 pt)] : 3 or 4  (1 point) [Never (0 pts)] : Never (0 points) [No] : In the past 12 months have you used drugs other than those required for medical reasons? No [No falls in past year] : Patient reported no falls in the past year [0] : 2) Feeling down, depressed, or hopeless: Not at all (0) [] : No [de-identified] : Neurologist-Dr. Medina [Audit-CScore] : 3 [de-identified] : None [de-identified] : Low salt [[id number]] : 0

## 2021-07-17 NOTE — ASSESSMENT
[FreeTextEntry1] : Patient not able to stay for the complete length of the visit because of scheduled [employer] transportation. She plans to come in on Monday 7/19 to complete blood work and EKG.

## 2021-07-17 NOTE — HISTORY OF PRESENT ILLNESS
[de-identified] : 65 yo F presenting for follow up. Also has acute abdominal complaints. States that it feels like her food gets stuck in her stomach and she gets nauseous sometimes. No vomiting. No exacerbating or alleviating factors.

## 2021-07-19 ENCOUNTER — APPOINTMENT (OUTPATIENT)
Dept: INTERNAL MEDICINE | Facility: CLINIC | Age: 66
End: 2021-07-19
Payer: MEDICARE

## 2021-07-19 DIAGNOSIS — R74.8 ABNORMAL LEVELS OF OTHER SERUM ENZYMES: ICD-10-CM

## 2021-07-19 PROCEDURE — 99072 ADDL SUPL MATRL&STAF TM PHE: CPT

## 2021-07-19 PROCEDURE — 36415 COLL VENOUS BLD VENIPUNCTURE: CPT

## 2021-07-19 PROCEDURE — 99212 OFFICE O/P EST SF 10 MIN: CPT | Mod: 25

## 2021-07-20 ENCOUNTER — NON-APPOINTMENT (OUTPATIENT)
Age: 66
End: 2021-07-20

## 2021-07-20 LAB — GGT SERPL-CCNC: 223 U/L

## 2021-07-21 ENCOUNTER — NON-APPOINTMENT (OUTPATIENT)
Age: 66
End: 2021-07-21

## 2021-07-21 LAB
HCV AB SER QL: NONREACTIVE
HCV S/CO RATIO: 0.16 S/CO

## 2021-07-27 ENCOUNTER — APPOINTMENT (OUTPATIENT)
Dept: PULMONOLOGY | Facility: CLINIC | Age: 66
End: 2021-07-27
Payer: MEDICARE

## 2021-07-27 ENCOUNTER — APPOINTMENT (OUTPATIENT)
Dept: CARDIOLOGY | Facility: CLINIC | Age: 66
End: 2021-07-27
Payer: MEDICARE

## 2021-07-27 VITALS
DIASTOLIC BLOOD PRESSURE: 70 MMHG | SYSTOLIC BLOOD PRESSURE: 98 MMHG | OXYGEN SATURATION: 95 % | HEART RATE: 77 BPM | TEMPERATURE: 97.5 F | HEIGHT: 67 IN

## 2021-07-27 DIAGNOSIS — I82.4Y2 ACUTE EMBOLISM AND THROMBOSIS OF UNSPECIFIED DEEP VEINS OF LEFT PROXIMAL LOWER EXTREMITY: ICD-10-CM

## 2021-07-27 DIAGNOSIS — R09.02 HYPOXEMIA: ICD-10-CM

## 2021-07-27 PROCEDURE — 99214 OFFICE O/P EST MOD 30 MIN: CPT

## 2021-07-27 PROCEDURE — 93306 TTE W/DOPPLER COMPLETE: CPT

## 2021-07-27 PROCEDURE — 99072 ADDL SUPL MATRL&STAF TM PHE: CPT

## 2021-07-28 PROBLEM — R09.02 HYPOXIA: Status: ACTIVE | Noted: 2019-05-16

## 2021-07-28 PROBLEM — I82.4Y2: Status: ACTIVE | Noted: 2018-08-30

## 2021-07-28 NOTE — HISTORY OF PRESENT ILLNESS
[TextBox_4] : MAUREEN PEÑA is a 66 year old female who presents with\par \par 1) SONU\par repeat PSG\par shows mild SONU\par she is still waking up overnight\par feels tired during the day \par \par 2) history of 'respiratory failure" - happed winter 2000, went to a [facility]\par she has 02 at home\par wants a portable \par DME: [employer]\par PFts done, here to review \par \par 3) DVT- diagnosed 2 years ago \par on eliquis. no bleeding issues\par reportedly no PE\par diagnosed in [facility]\par \par 4) dyspnea ongoing- multifactorial\par

## 2021-07-28 NOTE — CONSULT LETTER
[Dear  ___] : Dear  [unfilled], [Courtesy Letter:] : I had the pleasure of seeing your patient, [unfilled], in my office today. [Please see my note below.] : Please see my note below. [Consult Closing:] : Thank you very much for allowing me to participate in the care of this patient.  If you have any questions, please do not hesitate to contact me. [Sincerely,] : Sincerely, [FreeTextEntry3] : María Lopez D.O.\par [facility]\par [phone]\par [email or web address]\par

## 2021-08-18 ENCOUNTER — NON-APPOINTMENT (OUTPATIENT)
Age: 66
End: 2021-08-18

## 2021-08-23 ENCOUNTER — NON-APPOINTMENT (OUTPATIENT)
Age: 66
End: 2021-08-23

## 2021-08-23 ENCOUNTER — APPOINTMENT (OUTPATIENT)
Dept: CARDIOLOGY | Facility: CLINIC | Age: 66
End: 2021-08-23
Payer: MEDICARE

## 2021-08-23 VITALS — BODY MASS INDEX: 44.48 KG/M2 | HEART RATE: 89 BPM | OXYGEN SATURATION: 82 % | WEIGHT: 284 LBS

## 2021-08-23 VITALS — DIASTOLIC BLOOD PRESSURE: 66 MMHG | SYSTOLIC BLOOD PRESSURE: 132 MMHG

## 2021-08-23 DIAGNOSIS — G47.33 OBSTRUCTIVE SLEEP APNEA (ADULT) (PEDIATRIC): ICD-10-CM

## 2021-08-23 DIAGNOSIS — K21.9 GASTRO-ESOPHAGEAL REFLUX DISEASE W/OUT ESOPHAGITIS: ICD-10-CM

## 2021-08-23 DIAGNOSIS — Z98.84 BARIATRIC SURGERY STATUS: ICD-10-CM

## 2021-08-23 PROCEDURE — 93000 ELECTROCARDIOGRAM COMPLETE: CPT

## 2021-08-23 PROCEDURE — 99214 OFFICE O/P EST MOD 30 MIN: CPT

## 2021-08-23 NOTE — ASSESSMENT
[FreeTextEntry1] : She is hemodynamically stable.  She is morbidly obese and needs to lose significant weight.  Dysphagia also needs to be reevaluated.  She wanted to know if at her age she could have another bariatric procedure.  Results of testing done at [facility] are awaited.

## 2021-08-23 NOTE — DISCUSSION/SUMMARY
[FreeTextEntry1] : I  strongly advised her to see bariatric surgeon for the dysphagia and also for reevaluation to see if she needs any additional procedures weight loss.  I added an nifedipine to help delay worsening of aortic regurgitation and need for valve procedure.\par \par

## 2021-08-23 NOTE — HISTORY OF PRESENT ILLNESS
[FreeTextEntry1] : Denies any chest pain palpitation or dizziness.  She states that she has lost about 9 pounds.  She states that when she eats some food and gets stuck.  Once that happens even water does not "going down".\par \par Last week she had test done to rule out obstruction.  Apparently it looks like she had ultrasound.  Results are awaited.

## 2021-08-23 NOTE — PHYSICAL EXAM
[No Acute Distress] : no acute distress [Obese] : obese [Normal Conjunctiva] : normal conjunctiva [No Carotid Bruit] : no carotid bruit [Normal Venous Pressure] : normal venous pressure [Normal S1, S2] : normal S1, S2 [No Murmur] : no murmur [No Rub] : no rub [No Gallop] : no gallop [Clear Lung Fields] : clear lung fields [Good Air Entry] : good air entry [No Respiratory Distress] : no respiratory distress  [Soft] : abdomen soft [Non Tender] : non-tender [No Masses/organomegaly] : no masses/organomegaly [Normal Bowel Sounds] : normal bowel sounds [Normal Gait] : normal gait [No Edema] : no edema [No Cyanosis] : no cyanosis [No Clubbing] : no clubbing [No Varicosities] : no varicosities [No Rash] : no rash [No Skin Lesions] : no skin lesions [Moves all extremities] : moves all extremities [No Focal Deficits] : no focal deficits [Normal Speech] : normal speech [Alert and Oriented] : alert and oriented [Normal memory] : normal memory

## 2021-09-08 ENCOUNTER — APPOINTMENT (OUTPATIENT)
Dept: INTERNAL MEDICINE | Facility: CLINIC | Age: 66
End: 2021-09-08
Payer: MEDICARE

## 2021-09-08 VITALS
WEIGHT: 284 LBS | BODY MASS INDEX: 44.57 KG/M2 | DIASTOLIC BLOOD PRESSURE: 79 MMHG | SYSTOLIC BLOOD PRESSURE: 128 MMHG | TEMPERATURE: 97.2 F | OXYGEN SATURATION: 91 % | HEART RATE: 81 BPM | HEIGHT: 67 IN

## 2021-09-08 DIAGNOSIS — R19.8 OTHER SPECIFIED SYMPTOMS AND SIGNS INVOLVING THE DIGESTIVE SYSTEM AND ABDOMEN: ICD-10-CM

## 2021-09-08 PROCEDURE — 99214 OFFICE O/P EST MOD 30 MIN: CPT

## 2021-09-09 PROBLEM — R19.8 ABDOMINAL COMPLAINTS: Status: ACTIVE | Noted: 2021-07-16

## 2021-09-09 NOTE — HISTORY OF PRESENT ILLNESS
[de-identified] : 67 yo F presenting for follow up. Continues to have abdominal complaints. Still feels like her food gets stuck in her stomach and she gets nauseous sometimes associated with vomiting. No exacerbating or alleviating factors. \par \par Followed up discussion of another bariatric surgery as recommended by Dr. Henderson. She is unsure about getting another one done at her age and she remembers having a reaction to anesthesia on the operating table.  \par \par She follows with Dr. Willis for nephrology, regarding her CKD.

## 2021-09-09 NOTE — HEALTH RISK ASSESSMENT
[Yes] : Yes [2 - 4 times a month (2 pts)] : 2-4 times a month (2 points) [3 or 4 (1 pt)] : 3 or 4  (1 point) [Never (0 pts)] : Never (0 points) [No] : In the past 12 months have you used drugs other than those required for medical reasons? No [No falls in past year] : Patient reported no falls in the past year [0] : 2) Feeling down, depressed, or hopeless: Not at all (0) [] : No [de-identified] : Neurologist [Audit-CScore] : 3 [de-identified] : None [de-identified] : Low sodium [[id number]] : 0

## 2021-09-17 ENCOUNTER — NON-APPOINTMENT (OUTPATIENT)
Age: 66
End: 2021-09-17

## 2021-09-22 ENCOUNTER — RX CHANGE (OUTPATIENT)
Age: 66
End: 2021-09-22

## 2021-09-23 ENCOUNTER — APPOINTMENT (OUTPATIENT)
Dept: CARDIOLOGY | Facility: CLINIC | Age: 66
End: 2021-09-23

## 2021-11-10 ENCOUNTER — APPOINTMENT (OUTPATIENT)
Dept: INTERNAL MEDICINE | Facility: CLINIC | Age: 66
End: 2021-11-10
Payer: MEDICARE

## 2021-11-10 ENCOUNTER — APPOINTMENT (OUTPATIENT)
Dept: CARDIOLOGY | Facility: CLINIC | Age: 66
End: 2021-11-10
Payer: MEDICARE

## 2021-11-10 VITALS
OXYGEN SATURATION: 99 % | HEART RATE: 85 BPM | WEIGHT: 266 LBS | BODY MASS INDEX: 41.75 KG/M2 | HEIGHT: 67 IN | DIASTOLIC BLOOD PRESSURE: 77 MMHG | SYSTOLIC BLOOD PRESSURE: 150 MMHG | TEMPERATURE: 97.3 F

## 2021-11-10 VITALS — DIASTOLIC BLOOD PRESSURE: 80 MMHG | SYSTOLIC BLOOD PRESSURE: 120 MMHG

## 2021-11-10 DIAGNOSIS — R60.0 LOCALIZED EDEMA: ICD-10-CM

## 2021-11-10 DIAGNOSIS — R55 SYNCOPE AND COLLAPSE: ICD-10-CM

## 2021-11-10 DIAGNOSIS — I25.10 ATHEROSCLEROTIC HEART DISEASE OF NATIVE CORONARY ARTERY W/OUT ANGINA PECTORIS: ICD-10-CM

## 2021-11-10 PROCEDURE — 99214 OFFICE O/P EST MOD 30 MIN: CPT

## 2021-11-10 NOTE — HISTORY OF PRESENT ILLNESS
[de-identified] : 67 yo F here with concerns of syncope. Recently admitted to [place] and subsequent rehab for syncopal episodes. Denies leg weakness, chest pain or palpitations. Notes dizziness and lightheadedness as her only associated symptoms. Has chronic SOB. States seizures have been ruled out with prior EEG. No history of bradycardia. When asked if anything was revealed via telemetry inpatient, she denied being on telemetry (?).\par \par Had similar/same complaints in March 2021 and was prescribed meclizine but does not remember being prescribed/taking it. Has a history of postural hypotension which may be the cause of the syncope.

## 2021-11-10 NOTE — PHYSICAL EXAM
[No Acute Distress] : no acute distress [No Respiratory Distress] : no respiratory distress  [No Accessory Muscle Use] : no accessory muscle use [Normal Affect] : the affect was normal [Alert and Oriented x3] : oriented to person, place, and time [de-identified] : wheelchair

## 2021-11-10 NOTE — HEALTH RISK ASSESSMENT
[Yes] : Yes [No] : In the past 12 months have you used drugs other than those required for medical reasons? No [No falls in past year] : Patient reported no falls in the past year [0] : 2) Feeling down, depressed, or hopeless: Not at all (0) [] : No [de-identified] : No [de-identified] : Nursing home from first  September to October 27  [de-identified] : None [de-identified] : Low sodium [[id number]] : 0

## 2021-11-23 PROBLEM — R60.0 BILATERAL EDEMA OF LOWER EXTREMITY: Status: ACTIVE | Noted: 2017-04-07

## 2021-11-23 PROBLEM — I25.10 CAD (CORONARY ARTERY DISEASE): Status: ACTIVE | Noted: 2017-04-07

## 2021-11-23 NOTE — HISTORY OF PRESENT ILLNESS
[FreeTextEntry1] : On September 10 she started feeling lightheaded and dizzy and fell.  She was taken to [facility] ER.  She was there for 2 days.  Apparently all the tests were negative.  The did not have any diagnoses.  Because she was unsteady and she lives alone and she was afraid that she would fall down she was sent to [facility] for rehab.  She was discharged on 27 October.  Currently she has no symptoms.  She is on the same medications.

## 2021-11-23 NOTE — DISCUSSION/SUMMARY
[FreeTextEntry1] : She is doing well.  At this time I did not make any changes in her medications.  She has lost about 18 pounds with diet.  I encouraged her to continue losing more.

## 2021-11-23 NOTE — ASSESSMENT
[FreeTextEntry1] : She is stable.  I am not sure as to the reason for her fall.  I wanted to get EKG.  However because her access a ride came she had to leave and her EKG was not done.

## 2021-12-16 NOTE — PHYSICAL EXAM
[No Acute Distress] : no acute distress [No Respiratory Distress] : no respiratory distress  [Normal Affect] : the affect was normal [Alert and Oriented x3] : oriented to person, place, and time [de-identified] : obese [de-identified] : using a wheelchair

## 2021-12-16 NOTE — HISTORY OF PRESENT ILLNESS
[FreeTextEntry8] : 64 yo F with multiple co-morbidities including HTN, COPD and CKD stage 4 presenting with multiple complaints of dizziness, vision problems and inability to function with ADLs. She is interested in having a health aide at home to assist her a few times a week. She has a son who lives nearby who helps out but would like more help. She was seen in the past for dizziness and was noted to have postural hypotension, but states she has some ringing in her ears. Her vision problems consist of floaters and dark spots. She wears glasses.

## 2021-12-16 NOTE — HEALTH RISK ASSESSMENT
[Yes] : Yes [2 - 4 times a month (2 pts)] : 2-4 times a month (2 points) [3 or 4 (1 pt)] : 3 or 4  (1 point) [Never (0 pts)] : Never (0 points) [No] : In the past 12 months have you used drugs other than those required for medical reasons? No [No falls in past year] : Patient reported no falls in the past year [0] : 2) Feeling down, depressed, or hopeless: Not at all (0) [] : No [de-identified] : Cardiology, Pulmonologist [Audit-CScore] : 3 [de-identified] : None [de-identified] : Low salt [[id number]] : 0

## 2022-01-03 ENCOUNTER — APPOINTMENT (OUTPATIENT)
Dept: PULMONOLOGY | Facility: CLINIC | Age: 67
End: 2022-01-03

## 2022-02-10 ENCOUNTER — APPOINTMENT (OUTPATIENT)
Dept: INTERNAL MEDICINE | Facility: CLINIC | Age: 67
End: 2022-02-10
Payer: COMMERCIAL

## 2022-02-10 ENCOUNTER — LABORATORY RESULT (OUTPATIENT)
Age: 67
End: 2022-02-10

## 2022-02-10 VITALS
DIASTOLIC BLOOD PRESSURE: 79 MMHG | HEART RATE: 78 BPM | TEMPERATURE: 97 F | HEIGHT: 67 IN | SYSTOLIC BLOOD PRESSURE: 135 MMHG | OXYGEN SATURATION: 95 %

## 2022-02-10 DIAGNOSIS — E66.01 MORBID (SEVERE) OBESITY DUE TO EXCESS CALORIES: ICD-10-CM

## 2022-02-10 DIAGNOSIS — Z74.2 NEED FOR ASSISTANCE AT HOME AND NO OTHER HOUSEHOLD MEMBER ABLE TO RENDER CARE: ICD-10-CM

## 2022-02-10 DIAGNOSIS — M10.9 GOUT, UNSPECIFIED: ICD-10-CM

## 2022-02-10 PROCEDURE — 36415 COLL VENOUS BLD VENIPUNCTURE: CPT

## 2022-02-10 PROCEDURE — 99215 OFFICE O/P EST HI 40 MIN: CPT | Mod: 25

## 2022-02-10 PROCEDURE — 99396 PREV VISIT EST AGE 40-64: CPT | Mod: 25

## 2022-02-10 RX ORDER — ZOSTER VACCINE RECOMBINANT, ADJUVANTED 50 MCG/0.5
50 KIT INTRAMUSCULAR ONCE
Qty: 1 | Refills: 1 | Status: DISCONTINUED | COMMUNITY
Start: 2021-01-26 | End: 2022-02-10

## 2022-02-10 RX ORDER — TRAZODONE HYDROCHLORIDE 50 MG/1
50 TABLET ORAL
Qty: 90 | Refills: 1 | Status: DISCONTINUED | COMMUNITY
Start: 2020-01-24 | End: 2022-02-10

## 2022-02-10 RX ORDER — SERTRALINE 25 MG/1
25 TABLET, FILM COATED ORAL DAILY
Qty: 90 | Refills: 1 | Status: DISCONTINUED | COMMUNITY
Start: 2020-11-02 | End: 2022-02-10

## 2022-02-10 NOTE — HISTORY OF PRESENT ILLNESS
[de-identified] : 67 yo F here for CPE.\par \par 1) morbid obesity\par refusing to be weighed today because of fear of falling on the scale\par wants physical therapy\par \par 2) epistaxis\par 3 weeks prior- thick, clotted blood for 2 consecuative days\par continues to see some minor bleeding when blowing her nose\par no abnormal uterine bleeding and no longer menstruates \par one episode of rectal bleeding in the past while on DOAC but does not remember exact date and assumed it was from straining\par currently on Eliquis for PE (2019- unprovoked?) in the setting of history positive for DVT in 2018 (also on Eliquis at that time)\par \par 3) CKD IV\par CrCl 43.74 mL/min\par \par 4) COPD\par has O2 tank and CPAP\par cancelled appointment with Dr. Lopez due to feeling too sick and having no help\par \par 5) home care services\par needs a nursing aide at home

## 2022-02-10 NOTE — HEALTH RISK ASSESSMENT
[Good] : ~his/her~  mood as  good [Never] : Never [Yes] : Yes [2 - 3 times a week (3 pts)] : 2 - 3  times a week (3 points) [1 or 2 (0 pts)] : 1 or 2 (0 points) [Never (0 pts)] : Never (0 points) [No] : In the past 12 months have you used drugs other than those required for medical reasons? No [One fall no injury in past year] : Patient reported one fall in the past year without injury [0] : 1) Little interest or pleasure doing things: Not at all (0) [1] : 2) Feeling down, depressed, or hopeless for several days (1) [Patient reported mammogram was abnormal] : Patient reported mammogram was abnormal [With Family] : lives with family [Single] : single [Fully functional (bathing, dressing, toileting, transferring, walking, feeding)] : Fully functional (bathing, dressing, toileting, transferring, walking, feeding) [Fully functional (using the telephone, shopping, preparing meals, housekeeping, doing laundry, using] : Fully functional and needs no help or supervision to perform IADLs (using the telephone, shopping, preparing meals, housekeeping, doing laundry, using transportation, managing medications and managing finances) [Reports changes in hearing] : Reports changes in hearing [Reports changes in vision] : Reports changes in vision [Reports changes in dental health] : Reports changes in dental health [de-identified] : nurologist [Audit-CScore] : 3 [de-identified] : healty [[id number]] : 1 [Change in mental status noted] : No change in mental status noted [Smoke Detector] : no smoke detector [Carbon Monoxide Detector] : no carbon monoxide detector [MammogramDate] : 02/2020 [ColonoscopyDate] : 01/2020

## 2022-02-10 NOTE — PHYSICAL EXAM
[No Acute Distress] : no acute distress [Well-Appearing] : well-appearing [No Lymphadenopathy] : no lymphadenopathy [No Respiratory Distress] : no respiratory distress  [No Accessory Muscle Use] : no accessory muscle use [Clear to Auscultation] : lungs were clear to auscultation bilaterally [Normal Rate] : normal rate  [Regular Rhythm] : with a regular rhythm [Normal S1, S2] : normal S1 and S2 [No Spinal Tenderness] : no spinal tenderness [Normal Affect] : the affect was normal [Alert and Oriented x3] : oriented to person, place, and time [de-identified] : morbidly obese [de-identified] : walks with a walker

## 2022-02-10 NOTE — COUNSELING
[Potential consequences of obesity discussed] : Potential consequences of obesity discussed [Benefits of weight loss discussed] : Benefits of weight loss discussed [Encouraged to maintain food diary] : Encouraged to maintain food diary [Encouraged to increase physical activity] : Encouraged to increase physical activity [Encouraged to use exercise tracking device] : Encouraged to use exercise tracking device [Weigh Self Weekly] : weigh self weekly [Decrease Portions] : decrease portions [____ min/wk Activity] : [unfilled] min/wk activity [Keep Food Diary] : keep food diary [Inadequate social support] : Inadequate social support [FreeTextEntry3] : physical therapy exercises

## 2022-02-17 ENCOUNTER — APPOINTMENT (OUTPATIENT)
Dept: CARDIOLOGY | Facility: CLINIC | Age: 67
End: 2022-02-17
Payer: MEDICARE

## 2022-02-17 LAB
25(OH)D3 SERPL-MCNC: 10.7 NG/ML
ALBUMIN SERPL ELPH-MCNC: 4.2 G/DL
ALP BLD-CCNC: 182 U/L
ALT SERPL-CCNC: 9 U/L
ANION GAP SERPL CALC-SCNC: 15 MMOL/L
AST SERPL-CCNC: 18 U/L
BASOPHILS # BLD AUTO: 0.02 K/UL
BASOPHILS NFR BLD AUTO: 0.3 %
BILIRUB SERPL-MCNC: 0.3 MG/DL
BUN SERPL-MCNC: 31 MG/DL
CALCIUM SERPL-MCNC: 8.4 MG/DL
CHLORIDE SERPL-SCNC: 112 MMOL/L
CHOLEST SERPL-MCNC: 219 MG/DL
CO2 SERPL-SCNC: 14 MMOL/L
CREAT SERPL-MCNC: 2.33 MG/DL
EOSINOPHIL # BLD AUTO: 0.05 K/UL
EOSINOPHIL NFR BLD AUTO: 0.8 %
ESTIMATED AVERAGE GLUCOSE: 103 MG/DL
GLUCOSE SERPL-MCNC: 111 MG/DL
HBA1C MFR BLD HPLC: 5.2 %
HCT VFR BLD CALC: 31.8 %
HDLC SERPL-MCNC: 91 MG/DL
HGB BLD-MCNC: 9.6 G/DL
IMM GRANULOCYTES NFR BLD AUTO: 0.5 %
LDLC SERPL CALC-MCNC: NORMAL MG/DL
LYMPHOCYTES # BLD AUTO: 1.76 K/UL
LYMPHOCYTES NFR BLD AUTO: 27 %
MAN DIFF?: NORMAL
MCHC RBC-ENTMCNC: 30.2 GM/DL
MCHC RBC-ENTMCNC: 31 PG
MCV RBC AUTO: 102.6 FL
MONOCYTES # BLD AUTO: 0.26 K/UL
MONOCYTES NFR BLD AUTO: 4 %
NEUTROPHILS # BLD AUTO: 4.4 K/UL
NEUTROPHILS NFR BLD AUTO: 67.4 %
NONHDLC SERPL-MCNC: 127 MG/DL
PLATELET # BLD AUTO: 253 K/UL
POTASSIUM SERPL-SCNC: 4.1 MMOL/L
PROT SERPL-MCNC: 7.5 G/DL
RBC # BLD: 3.1 M/UL
RBC # FLD: 18.2 %
SODIUM SERPL-SCNC: 141 MMOL/L
TRIGL SERPL-MCNC: 431 MG/DL
TSH SERPL-ACNC: 5.43 UIU/ML
URATE SERPL-MCNC: 9.8 MG/DL
VIT B12 SERPL-MCNC: 265 PG/ML
WBC # FLD AUTO: 6.52 K/UL

## 2022-02-17 PROCEDURE — 93306 TTE W/DOPPLER COMPLETE: CPT

## 2022-02-17 RX ORDER — FLUTICASONE FUROATE, UMECLIDINIUM BROMIDE AND VILANTEROL TRIFENATATE 100; 62.5; 25 UG/1; UG/1; UG/1
100-62.5-25 POWDER RESPIRATORY (INHALATION)
Qty: 1 | Refills: 0 | Status: COMPLETED | COMMUNITY
Start: 2021-03-15 | End: 2022-02-17

## 2022-04-13 ENCOUNTER — NON-APPOINTMENT (OUTPATIENT)
Age: 67
End: 2022-04-13

## 2022-04-18 ENCOUNTER — APPOINTMENT (OUTPATIENT)
Dept: INTERNAL MEDICINE | Facility: CLINIC | Age: 67
End: 2022-04-18

## 2022-04-21 ENCOUNTER — NON-APPOINTMENT (OUTPATIENT)
Age: 67
End: 2022-04-21

## 2022-04-22 ENCOUNTER — LABORATORY RESULT (OUTPATIENT)
Age: 67
End: 2022-04-22

## 2022-04-22 ENCOUNTER — APPOINTMENT (OUTPATIENT)
Dept: INTERNAL MEDICINE | Facility: CLINIC | Age: 67
End: 2022-04-22
Payer: MEDICARE

## 2022-04-22 ENCOUNTER — NON-APPOINTMENT (OUTPATIENT)
Age: 67
End: 2022-04-22

## 2022-04-22 VITALS
OXYGEN SATURATION: 97 % | DIASTOLIC BLOOD PRESSURE: 63 MMHG | HEART RATE: 78 BPM | TEMPERATURE: 97.2 F | HEIGHT: 67 IN | SYSTOLIC BLOOD PRESSURE: 110 MMHG

## 2022-04-22 DIAGNOSIS — R42 DIZZINESS AND GIDDINESS: ICD-10-CM

## 2022-04-22 DIAGNOSIS — J45.909 UNSPECIFIED ASTHMA, UNCOMPLICATED: ICD-10-CM

## 2022-04-22 DIAGNOSIS — I27.20 PULMONARY HYPERTENSION, UNSPECIFIED: ICD-10-CM

## 2022-04-22 DIAGNOSIS — H53.9 UNSPECIFIED VISUAL DISTURBANCE: ICD-10-CM

## 2022-04-22 DIAGNOSIS — Z99.81 DEPENDENCE ON SUPPLEMENTAL OXYGEN: ICD-10-CM

## 2022-04-22 PROCEDURE — 99214 OFFICE O/P EST MOD 30 MIN: CPT | Mod: 25

## 2022-04-22 PROCEDURE — 36415 COLL VENOUS BLD VENIPUNCTURE: CPT

## 2022-04-22 RX ORDER — FLUTICASONE FUROATE, UMECLIDINIUM BROMIDE AND VILANTEROL TRIFENATATE 100; 62.5; 25 UG/1; UG/1; UG/1
100-62.5-25 POWDER RESPIRATORY (INHALATION)
Qty: 1 | Refills: 6 | Status: DISCONTINUED | COMMUNITY
Start: 2021-07-27 | End: 2022-04-22

## 2022-04-22 NOTE — REVIEW OF SYSTEMS
[Shortness Of Breath] : shortness of breath [Dizziness] : dizziness [Fainting] : fainting [Unsteady Walking] : ataxia [Anxiety] : anxiety

## 2022-04-23 ENCOUNTER — LABORATORY RESULT (OUTPATIENT)
Age: 67
End: 2022-04-23

## 2022-04-25 PROBLEM — R42 DIZZINESS: Status: ACTIVE | Noted: 2019-06-21

## 2022-04-25 NOTE — ASSESSMENT
[FreeTextEntry1] : 66 year old female presents for f/u dizziness and multiple comorbidities.\par Renewed rx to [facility] per patient request will follow-up with [facility].\par Labs drawn in-office\par Further management pending lab results\par Reviewed primary care and specialist notes.

## 2022-04-25 NOTE — HISTORY OF PRESENT ILLNESS
[FreeTextEntry8] : 66 year old female presents for persistent dizziness. PSHx bariatric surgery. Refused weight cites concerns about falling.\par Reports that she did not receive meclizine from [facility].\par Patient living alone, several falls, concerned about safety.\par In agreement that she need HHA. Last chart note shows Dr. Davalos initiated paperwork for HHA, unclear if that has been processed\par \par Last night patient was watching tv and lost vision in both eyes. Previously evaluated by ophthalmology, no ophthalmologic explanation of symptoms.\par Neurology, Dr. Carlo Kan, suggests possible stroke events. Patient last visit 08/16/21 did not return for 1 month f/u. Patient to complete MRI stated she is still awaiting authorization.\par \par Patient also followed by pulmonology, Dr. Lopez.\par Uses home stationary oxygen and CPAP. Wants portable oxygen, initiated by Dr. Lopez. Could not complete 6 minute walk test with patient.\par Rx trelegy, also has combivent prescription. States that trelegy was d/c by insurance. Dr. Lopez did not prescribe combination therapy.\par Recommended nephrology consultation, symptoms may also be related to fluid overload.\par \par Cardiology evaluated 11/10/2021 last visit\par Echo mild-moderate aortic regurg, mild diastolic dysfunction stage 1\par Could not complete EKG at the time.\par Did not complete 3 month f/u

## 2022-04-25 NOTE — PHYSICAL EXAM
[Normal] : normal rate, regular rhythm, normal S1 and S2 and no murmur heard [de-identified] : using wheelchair

## 2022-04-25 NOTE — HEALTH RISK ASSESSMENT
[Never] : Never [Yes] : Yes [2 - 3 times a week (3 pts)] : 2 - 3  times a week (3 points) [1 or 2 (0 pts)] : 1 or 2 (0 points) [Never (0 pts)] : Never (0 points) [No] : In the past 12 months have you used drugs other than those required for medical reasons? No [Two or more falls in past year] : Patient reported two or more falls in the past year [0] : 1) Little interest or pleasure doing things: Not at all (0) [1] : 2) Feeling down, depressed, or hopeless for several days (1) [Audit-CScore] : 3 [de-identified] : None [de-identified] : Poor diet [[id number]] : 1

## 2022-04-26 LAB
25(OH)D3 SERPL-MCNC: 6.7 NG/ML
ALBUMIN SERPL ELPH-MCNC: 4 G/DL
ALP BLD-CCNC: 151 U/L
ALT SERPL-CCNC: 9 U/L
ANION GAP SERPL CALC-SCNC: 14 MMOL/L
APPEARANCE: ABNORMAL
AST SERPL-CCNC: 16 U/L
BASOPHILS # BLD AUTO: 0 K/UL
BASOPHILS NFR BLD AUTO: 0 %
BILIRUB SERPL-MCNC: 0.3 MG/DL
BILIRUBIN URINE: NEGATIVE
BLOOD URINE: NEGATIVE
BUN SERPL-MCNC: 27 MG/DL
CALCIUM SERPL-MCNC: 7.9 MG/DL
CHLORIDE SERPL-SCNC: 113 MMOL/L
CHOLEST SERPL-MCNC: 242 MG/DL
CO2 SERPL-SCNC: 13 MMOL/L
COLOR: COLORLESS
CREAT SERPL-MCNC: 2.57 MG/DL
EGFR: 20 ML/MIN/1.73M2
EOSINOPHIL # BLD AUTO: 0 K/UL
EOSINOPHIL NFR BLD AUTO: 0 %
ESTIMATED AVERAGE GLUCOSE: 97 MG/DL
FOLATE SERPL-MCNC: 9.5 NG/ML
GGT SERPL-CCNC: 217 U/L
GLUCOSE QUALITATIVE U: NEGATIVE
GLUCOSE SERPL-MCNC: 108 MG/DL
HBA1C MFR BLD HPLC: 5 %
HCT VFR BLD CALC: 31.5 %
HDLC SERPL-MCNC: 109 MG/DL
HGB BLD-MCNC: 9.2 G/DL
KETONES URINE: NEGATIVE
LDLC SERPL CALC-MCNC: 110 MG/DL
LEUKOCYTE ESTERASE URINE: ABNORMAL
LYMPHOCYTES # BLD AUTO: 1.68 K/UL
LYMPHOCYTES NFR BLD AUTO: 26 %
MAN DIFF?: NORMAL
MCHC RBC-ENTMCNC: 29.2 GM/DL
MCHC RBC-ENTMCNC: 32.4 PG
MCV RBC AUTO: 110.9 FL
MONOCYTES # BLD AUTO: 0.16 K/UL
MONOCYTES NFR BLD AUTO: 2.4 %
NEUTROPHILS # BLD AUTO: 4.64 K/UL
NEUTROPHILS NFR BLD AUTO: 71.6 %
NITRITE URINE: NEGATIVE
NONHDLC SERPL-MCNC: 133 MG/DL
PH URINE: 6
PLATELET # BLD AUTO: 204 K/UL
POTASSIUM SERPL-SCNC: 4.1 MMOL/L
PROT SERPL-MCNC: 7.4 G/DL
PROTEIN URINE: ABNORMAL
RBC # BLD: 2.84 M/UL
RBC # FLD: 16.9 %
SODIUM SERPL-SCNC: 140 MMOL/L
SPECIFIC GRAVITY URINE: 1.01
TRIGL SERPL-MCNC: 114 MG/DL
TSH SERPL-ACNC: 7.86 UIU/ML
UROBILINOGEN URINE: NORMAL
VIT B12 SERPL-MCNC: 483 PG/ML
WBC # FLD AUTO: 6.48 K/UL

## 2022-05-11 ENCOUNTER — NON-APPOINTMENT (OUTPATIENT)
Age: 67
End: 2022-05-11

## 2022-05-12 ENCOUNTER — NON-APPOINTMENT (OUTPATIENT)
Age: 67
End: 2022-05-12

## 2022-05-12 ENCOUNTER — APPOINTMENT (OUTPATIENT)
Dept: PULMONOLOGY | Facility: CLINIC | Age: 67
End: 2022-05-12
Payer: MEDICARE

## 2022-05-12 ENCOUNTER — LABORATORY RESULT (OUTPATIENT)
Age: 67
End: 2022-05-12

## 2022-05-12 VITALS
HEIGHT: 68 IN | OXYGEN SATURATION: 97 % | RESPIRATION RATE: 15 BRPM | BODY MASS INDEX: 40.32 KG/M2 | DIASTOLIC BLOOD PRESSURE: 65 MMHG | WEIGHT: 266 LBS | TEMPERATURE: 97.1 F | HEART RATE: 80 BPM | SYSTOLIC BLOOD PRESSURE: 97 MMHG

## 2022-05-12 PROCEDURE — 99214 OFFICE O/P EST MOD 30 MIN: CPT | Mod: 25

## 2022-05-12 PROCEDURE — 71046 X-RAY EXAM CHEST 2 VIEWS: CPT

## 2022-05-12 NOTE — HISTORY OF PRESENT ILLNESS
[Never] : never [TextBox_4] : MAUREEN PEÑA is a 66 year old female who presents for dyspnea f/u\par \par she has not been seen in almost a year\par \par 1) SONU- had a repeat PSG\par not usign her CPAP machine \par shows mild SONU \par \par 2) history of 'respiratory failure" - happed winter 2000, went to a [facility]\par she has 02 at home\par DME: [facility]\par PFts done- last year show moderate obstruction based on erum with moderate/severe reduction in the dlco not corrected\par she is rx: trelegy\par has not used it  in months  so she has been using combivent\par  \par \par 3) DVT- diagnosed 2 years ago \par on eliquis. no bleeding issues\par reportedly no PE\par diagnosed in [facility]\par \par 4) dyspnea ongoing- multifactorial\par denies smoking\par \par \par today we tried to do pfts but she appeared winded, weak, \par recovered in the exam room\par vitals stable \par

## 2022-05-12 NOTE — PHYSICAL EXAM
[Well Nourished] : well nourished [Well Developed] : well developed [Normal Rate/Rhythm] : normal rate/rhythm [Normal S1, S2] : normal s1, s2 [No Resp Distress] : no resp distress [Clear to Auscultation Bilaterally] : clear to auscultation bilaterally [Oriented x3] : oriented x3

## 2022-05-12 NOTE — ASSESSMENT
[FreeTextEntry1] : her dyspnea is multifactorial\par based on vitals today- no need for oxygen\par her chest xray does not indicate any acute cardiopulmonary disease\par i suggested ER but she refused\par we will need to f/u on cpap use? \par she needs trelegy- new rx sent into her mail away pharmacy\par i believe she needs cardiac evaluation\par she reports having oxygen at home.

## 2022-05-13 ENCOUNTER — NON-APPOINTMENT (OUTPATIENT)
Age: 67
End: 2022-05-13

## 2022-05-13 LAB
ALBUMIN SERPL ELPH-MCNC: 4.2 G/DL
ALP BLD-CCNC: 163 U/L
ALT SERPL-CCNC: 10 U/L
ANION GAP SERPL CALC-SCNC: 17 MMOL/L
AST SERPL-CCNC: 20 U/L
BASOPHILS # BLD AUTO: 0.02 K/UL
BASOPHILS NFR BLD AUTO: 0.2 %
BILIRUB SERPL-MCNC: 0.5 MG/DL
BUN SERPL-MCNC: 25 MG/DL
CALCIUM SERPL-MCNC: 8 MG/DL
CHLORIDE SERPL-SCNC: 111 MMOL/L
CO2 SERPL-SCNC: 12 MMOL/L
COVID-19 SPIKE DOMAIN ANTIBODY INTERPRETATION: POSITIVE
CREAT SERPL-MCNC: 2.51 MG/DL
DEPRECATED D DIMER PPP IA-ACNC: <150 NG/ML DDU
EGFR: 21 ML/MIN/1.73M2
EOSINOPHIL # BLD AUTO: 0.04 K/UL
EOSINOPHIL NFR BLD AUTO: 0.5 %
GLUCOSE SERPL-MCNC: 112 MG/DL
HCT VFR BLD CALC: 31.8 %
HGB BLD-MCNC: 10 G/DL
IMM GRANULOCYTES NFR BLD AUTO: 0.5 %
LYMPHOCYTES # BLD AUTO: 1.69 K/UL
LYMPHOCYTES NFR BLD AUTO: 19.9 %
MAN DIFF?: NORMAL
MCHC RBC-ENTMCNC: 31.4 GM/DL
MCHC RBC-ENTMCNC: 34.2 PG
MCV RBC AUTO: 108.9 FL
MONOCYTES # BLD AUTO: 0.29 K/UL
MONOCYTES NFR BLD AUTO: 3.4 %
NEUTROPHILS # BLD AUTO: 6.43 K/UL
NEUTROPHILS NFR BLD AUTO: 75.5 %
NT-PROBNP SERPL-MCNC: 1857 PG/ML
PLATELET # BLD AUTO: 255 K/UL
POTASSIUM SERPL-SCNC: 3.8 MMOL/L
PROT SERPL-MCNC: 7.3 G/DL
RBC # BLD: 2.92 M/UL
RBC # FLD: 15.5 %
SARS-COV-2 AB SERPL IA-ACNC: >250 U/ML
SODIUM SERPL-SCNC: 140 MMOL/L
WBC # FLD AUTO: 8.51 K/UL

## 2022-05-16 ENCOUNTER — NON-APPOINTMENT (OUTPATIENT)
Age: 67
End: 2022-05-16

## 2022-05-16 ENCOUNTER — APPOINTMENT (OUTPATIENT)
Dept: INTERNAL MEDICINE | Facility: CLINIC | Age: 67
End: 2022-05-16
Payer: MEDICARE

## 2022-05-16 PROCEDURE — 99442: CPT

## 2022-05-19 ENCOUNTER — NON-APPOINTMENT (OUTPATIENT)
Age: 67
End: 2022-05-19

## 2022-05-24 ENCOUNTER — APPOINTMENT (OUTPATIENT)
Dept: INTERNAL MEDICINE | Facility: CLINIC | Age: 67
End: 2022-05-24

## 2022-05-26 ENCOUNTER — INPATIENT (INPATIENT)
Facility: HOSPITAL | Age: 67
LOS: 4 days | Discharge: HOME HEALTH | DRG: 286 | End: 2022-05-31
Attending: INTERNAL MEDICINE | Admitting: INTERNAL MEDICINE
Payer: MEDICARE

## 2022-05-26 VITALS
RESPIRATION RATE: 17 BRPM | SYSTOLIC BLOOD PRESSURE: 129 MMHG | DIASTOLIC BLOOD PRESSURE: 64 MMHG | HEART RATE: 63 BPM | OXYGEN SATURATION: 98 % | TEMPERATURE: 98 F

## 2022-05-26 DIAGNOSIS — D64.9 ANEMIA, UNSPECIFIED: ICD-10-CM

## 2022-05-26 DIAGNOSIS — E03.9 HYPOTHYROIDISM, UNSPECIFIED: ICD-10-CM

## 2022-05-26 DIAGNOSIS — Z41.9 ENCOUNTER FOR PROCEDURE FOR PURPOSES OTHER THAN REMEDYING HEALTH STATE, UNSPECIFIED: Chronic | ICD-10-CM

## 2022-05-26 DIAGNOSIS — R06.02 SHORTNESS OF BREATH: ICD-10-CM

## 2022-05-26 DIAGNOSIS — Z98.84 BARIATRIC SURGERY STATUS: Chronic | ICD-10-CM

## 2022-05-26 DIAGNOSIS — I10 ESSENTIAL (PRIMARY) HYPERTENSION: ICD-10-CM

## 2022-05-26 DIAGNOSIS — Z29.9 ENCOUNTER FOR PROPHYLACTIC MEASURES, UNSPECIFIED: ICD-10-CM

## 2022-05-26 DIAGNOSIS — J44.9 CHRONIC OBSTRUCTIVE PULMONARY DISEASE, UNSPECIFIED: ICD-10-CM

## 2022-05-26 DIAGNOSIS — R09.89 OTHER SPECIFIED SYMPTOMS AND SIGNS INVOLVING THE CIRCULATORY AND RESPIRATORY SYSTEMS: ICD-10-CM

## 2022-05-26 DIAGNOSIS — M10.9 GOUT, UNSPECIFIED: ICD-10-CM

## 2022-05-26 DIAGNOSIS — I48.0 PAROXYSMAL ATRIAL FIBRILLATION: ICD-10-CM

## 2022-05-26 DIAGNOSIS — N18.30 CHRONIC KIDNEY DISEASE, STAGE 3 UNSPECIFIED: ICD-10-CM

## 2022-05-26 LAB
ALBUMIN SERPL ELPH-MCNC: 4 G/DL — SIGNIFICANT CHANGE UP (ref 3.3–5)
ALP SERPL-CCNC: 216 U/L — HIGH (ref 40–120)
ALT FLD-CCNC: 22 U/L — SIGNIFICANT CHANGE UP (ref 10–45)
ANION GAP SERPL CALC-SCNC: 14 MMOL/L — SIGNIFICANT CHANGE UP (ref 5–17)
ANISOCYTOSIS BLD QL: SLIGHT — SIGNIFICANT CHANGE UP
AST SERPL-CCNC: 37 U/L — SIGNIFICANT CHANGE UP (ref 10–40)
BASOPHILS # BLD AUTO: 0 K/UL — SIGNIFICANT CHANGE UP (ref 0–0.2)
BASOPHILS NFR BLD AUTO: 0 % — SIGNIFICANT CHANGE UP (ref 0–2)
BILIRUB SERPL-MCNC: 0.2 MG/DL — SIGNIFICANT CHANGE UP (ref 0.2–1.2)
BUN SERPL-MCNC: 25 MG/DL — HIGH (ref 7–23)
CALCIUM SERPL-MCNC: 6.9 MG/DL — LOW (ref 8.4–10.5)
CHLORIDE SERPL-SCNC: 110 MMOL/L — HIGH (ref 96–108)
CO2 SERPL-SCNC: 14 MMOL/L — LOW (ref 22–31)
CREAT SERPL-MCNC: 2.6 MG/DL — HIGH (ref 0.5–1.3)
EGFR: 20 ML/MIN/1.73M2 — LOW
EOSINOPHIL # BLD AUTO: 0 K/UL — SIGNIFICANT CHANGE UP (ref 0–0.5)
EOSINOPHIL NFR BLD AUTO: 0 % — SIGNIFICANT CHANGE UP (ref 0–6)
GIANT PLATELETS BLD QL SMEAR: PRESENT — SIGNIFICANT CHANGE UP
GLUCOSE SERPL-MCNC: 114 MG/DL — HIGH (ref 70–99)
HCT VFR BLD CALC: 29.2 % — LOW (ref 34.5–45)
HGB BLD-MCNC: 8.9 G/DL — LOW (ref 11.5–15.5)
LYMPHOCYTES # BLD AUTO: 1.52 K/UL — SIGNIFICANT CHANGE UP (ref 1–3.3)
LYMPHOCYTES # BLD AUTO: 22.6 % — SIGNIFICANT CHANGE UP (ref 13–44)
MACROCYTES BLD QL: SLIGHT — SIGNIFICANT CHANGE UP
MAGNESIUM SERPL-MCNC: 1.5 MG/DL — LOW (ref 1.6–2.6)
MANUAL SMEAR VERIFICATION: SIGNIFICANT CHANGE UP
MCHC RBC-ENTMCNC: 30.5 GM/DL — LOW (ref 32–36)
MCHC RBC-ENTMCNC: 32.7 PG — SIGNIFICANT CHANGE UP (ref 27–34)
MCV RBC AUTO: 107.4 FL — HIGH (ref 80–100)
MICROCYTES BLD QL: SLIGHT — SIGNIFICANT CHANGE UP
MONOCYTES # BLD AUTO: 0.47 K/UL — SIGNIFICANT CHANGE UP (ref 0–0.9)
MONOCYTES NFR BLD AUTO: 7 % — SIGNIFICANT CHANGE UP (ref 2–14)
NEUTROPHILS # BLD AUTO: 4.74 K/UL — SIGNIFICANT CHANGE UP (ref 1.8–7.4)
NEUTROPHILS NFR BLD AUTO: 70.4 % — SIGNIFICANT CHANGE UP (ref 43–77)
NT-PROBNP SERPL-SCNC: 1052 PG/ML — HIGH (ref 0–300)
OVALOCYTES BLD QL SMEAR: SLIGHT — SIGNIFICANT CHANGE UP
PLAT MORPH BLD: NORMAL — SIGNIFICANT CHANGE UP
PLATELET # BLD AUTO: 236 K/UL — SIGNIFICANT CHANGE UP (ref 150–400)
POIKILOCYTOSIS BLD QL AUTO: SLIGHT — SIGNIFICANT CHANGE UP
POLYCHROMASIA BLD QL SMEAR: SLIGHT — SIGNIFICANT CHANGE UP
POTASSIUM SERPL-MCNC: 3.8 MMOL/L — SIGNIFICANT CHANGE UP (ref 3.5–5.3)
POTASSIUM SERPL-SCNC: 3.8 MMOL/L — SIGNIFICANT CHANGE UP (ref 3.5–5.3)
PROT SERPL-MCNC: 7.4 G/DL — SIGNIFICANT CHANGE UP (ref 6–8.3)
RBC # BLD: 2.72 M/UL — LOW (ref 3.8–5.2)
RBC # FLD: 15 % — HIGH (ref 10.3–14.5)
RBC BLD AUTO: ABNORMAL
SARS-COV-2 RNA SPEC QL NAA+PROBE: SIGNIFICANT CHANGE UP
SCHISTOCYTES BLD QL AUTO: SLIGHT — SIGNIFICANT CHANGE UP
SODIUM SERPL-SCNC: 138 MMOL/L — SIGNIFICANT CHANGE UP (ref 135–145)
TROPONIN T, HIGH SENSITIVITY RESULT: 33 NG/L — SIGNIFICANT CHANGE UP (ref 0–51)
WBC # BLD: 6.74 K/UL — SIGNIFICANT CHANGE UP (ref 3.8–10.5)
WBC # FLD AUTO: 6.74 K/UL — SIGNIFICANT CHANGE UP (ref 3.8–10.5)

## 2022-05-26 PROCEDURE — 71045 X-RAY EXAM CHEST 1 VIEW: CPT | Mod: 26

## 2022-05-26 PROCEDURE — 71250 CT THORAX DX C-: CPT | Mod: 26,MA

## 2022-05-26 PROCEDURE — 99223 1ST HOSP IP/OBS HIGH 75: CPT

## 2022-05-26 PROCEDURE — 99285 EMERGENCY DEPT VISIT HI MDM: CPT

## 2022-05-26 RX ORDER — MAGNESIUM SULFATE 500 MG/ML
2 VIAL (ML) INJECTION ONCE
Refills: 0 | Status: COMPLETED | OUTPATIENT
Start: 2022-05-26 | End: 2022-05-26

## 2022-05-26 RX ORDER — ONDANSETRON 8 MG/1
4 TABLET, FILM COATED ORAL EVERY 8 HOURS
Refills: 0 | Status: DISCONTINUED | OUTPATIENT
Start: 2022-05-26 | End: 2022-05-31

## 2022-05-26 RX ORDER — ACETAMINOPHEN 500 MG
650 TABLET ORAL EVERY 6 HOURS
Refills: 0 | Status: DISCONTINUED | OUTPATIENT
Start: 2022-05-26 | End: 2022-05-31

## 2022-05-26 RX ORDER — ALLOPURINOL 300 MG
300 TABLET ORAL DAILY
Refills: 0 | Status: DISCONTINUED | OUTPATIENT
Start: 2022-05-26 | End: 2022-05-31

## 2022-05-26 RX ORDER — APIXABAN 2.5 MG/1
5 TABLET, FILM COATED ORAL
Refills: 0 | Status: DISCONTINUED | OUTPATIENT
Start: 2022-05-26 | End: 2022-05-26

## 2022-05-26 RX ORDER — LEVOTHYROXINE SODIUM 125 MCG
100 TABLET ORAL DAILY
Refills: 0 | Status: DISCONTINUED | OUTPATIENT
Start: 2022-05-26 | End: 2022-05-28

## 2022-05-26 RX ORDER — LANOLIN ALCOHOL/MO/W.PET/CERES
3 CREAM (GRAM) TOPICAL AT BEDTIME
Refills: 0 | Status: DISCONTINUED | OUTPATIENT
Start: 2022-05-26 | End: 2022-05-31

## 2022-05-26 RX ORDER — FOLIC ACID 0.8 MG
1 TABLET ORAL DAILY
Refills: 0 | Status: DISCONTINUED | OUTPATIENT
Start: 2022-05-26 | End: 2022-05-31

## 2022-05-26 RX ORDER — PANTOPRAZOLE SODIUM 20 MG/1
40 TABLET, DELAYED RELEASE ORAL
Refills: 0 | Status: DISCONTINUED | OUTPATIENT
Start: 2022-05-26 | End: 2022-05-31

## 2022-05-26 RX ADMIN — Medication 25 GRAM(S): at 19:40

## 2022-05-26 NOTE — ED ADULT NURSE NOTE - OBJECTIVE STATEMENT
Pt is a 66 Y F, A&Ox4, uses wheelchair at baseline, hx of COPD/asthma, presenting to the ED by EMS c/o SOB and non exertional chest discomfort. Pt reports symptom worsening over the last few months, saw cardiologist on 5/23, failed stress test, was recommended to come to ER directly from MD office, pt declined. Pt reports N/V/D as well over the past 3 days. Pt denies any pain, dizziness, weakness, or urinary changes. Pt airway patent, breathing spontaneously and unlabored on 3lpm O2 by NC at rest, slightly labored on exertion, no crackles/wheezed noted, skin warm, dry, consistent w/ ethnicity, no pedal edema noted, pt obese, abd soft, nontender, rounded, no guarding noted . Pt resting comfortably, safety and comfort measures provided, bed locked and in lowest position, call bell within reach, awaiting MD consult

## 2022-05-26 NOTE — ED PROVIDER NOTE - PHYSICAL EXAMINATION
Vital signs reviewed  GENERAL: Patient nontoxic appearing, NAD  HEAD: NCAT  EYES: Anicteric  ENT: MMM  NECK: Supple, non tender  RESPIRATORY: on 3L nc, lungs clear.   CARDIOVASCULAR: Regular rate and rhythm  ABDOMEN: Soft. Nondistended. Nontender. No guarding or rebound. No CVA tenderness.  MUSCULOSKELETAL/EXTREMITIES: Brisk cap refill. 2+ radial pulses. 1+ leg edema.  SKIN:  Warm and dry  NEURO: AAOx3. No gross FND.  PSYCHIATRIC: Cooperative. Affect appropriate.

## 2022-05-26 NOTE — H&P ADULT - ASSESSMENT
66F w/ pmhx of CODP on 3l nc sometimes at home, htn, Afib, DVT on eliquis, CKD, gout, HLD p/w worsening sob and positive stress test with plans for [facility] 66F w/ pmhx of COPD on 3L nc sometimes at home, HTN, Afib, DVT on eliquis, CKD, gout, HLD p/w worsening sob and positive stress test with plans for [facility]

## 2022-05-26 NOTE — H&P ADULT - NSHPADDITIONALINFOADULT_GEN_ALL_CORE
I was asked to see this patient by the hospitalist in charge. Dr. Murphy to assume care for patient in AM and thereafter

## 2022-05-26 NOTE — ED ADULT NURSE REASSESSMENT NOTE - NS ED NURSE REASSESS COMMENT FT1
Pt endorses needing to have a BM, pt easily SOB and unsteady on feet at baseline, was given the choice of a bedpan or diaper, pt declined, attending MD Archuleta from admitting at bedside, as per MD SAPP pt to attempt to use commode

## 2022-05-26 NOTE — ED ADULT NURSE REASSESSMENT NOTE - NS ED NURSE REASSESS COMMENT FT1
Pt assisted to commode at bedside, pt appeared to have mildly increased SOB on exertion but able to manage w/ assistance, moved back to bed, reattached to the monitor, pt safety maintained

## 2022-05-26 NOTE — ED PROVIDER NOTE - CLINICAL SUMMARY MEDICAL DECISION MAKING FREE TEXT BOX
[place] ddx likely unstable angina pending EKGs/CXR/cardiac monitor/labs  2) reassess. admission for cath in am.

## 2022-05-26 NOTE — ED ADULT NURSE NOTE - NSICDXPASTMEDICALHX_GEN_ALL_CORE_FT
PAST MEDICAL HISTORY:  Anemia     Essential hypertension     Gout     History of COPD On 3LNC sometimes at home    SONU on CPAP     Paroxysmal atrial fibrillation     Stage 3 chronic kidney disease

## 2022-05-26 NOTE — H&P ADULT - PROBLEM SELECTOR PLAN 1
Recent positive stress test. 5/23 TTE EF 5055%, mild hypertrophy, moderate aortic regurgitation. Tentative plan for [facility] in AM  -Telemetry  -Pt not sure if she is actually taking atorvastatin at home  -F/u cardiology recommendations Recent positive stress test. 5/23 TTE EF 5055%, mild hypertrophy, moderate aortic regurgitation. Tentative plan for [facility] in AM  -Telemetry  -Pt not sure if she is actually taking atorvastatin at home  -F/u cardiology recommendations  -Consider nephrology consult given CKD and need for IV contrast

## 2022-05-26 NOTE — H&P ADULT - PROBLEM/PLAN-7
Referred by: Dolores Yo DO; Medical Diagnosis (from order):    Diagnosis Information      Diagnosis    338.4 (ICD-9-CM) - G89.4 (ICD-10-CM) - Chronic pain disorder    719.41, 338.29 (ICD-9-CM) - M25.512, G89.29 (ICD-10-CM) - Chronic left shoulder pain    715.89 (ICD-9-CM) - M15.8 (ICD-10-CM) - Other osteoarthritis involving multiple joints              ED Diagnosis   1. Chronic pain of both shoulders     2. Decreased range of motion of shoulder, unspecified laterality     3. Shoulder weakness         Initial Evaluation    Visit:  1   Treatment Diagnosis: left: shoulder, right: shoulder symptoms with increased pain/symptoms, impaired posture, impaired strength, impaired scapulohumeral rhythm, impaired range of motion, impaired tissue mobility, impaired mobility, impaired activity tolerance, impaired motor function/performance/coordination  Date of onset: January 2021  Chart reviewed at time of initial evaluation (relevant co-morbidities, allergies, tests and medications listed): Patient Active Problem List:     PVD (peripheral vascular disease) (CMS/[facility])     DJD (degenerative joint disease)     Chronic systolic CHF (congestive heart failure) (CMS/HCC)     Chronic pain disorder     Low back pain without sciatica     Morbid obesity with body mass index of 40.0-44.9 in adult (CMS/HCC)     Peripheral polyneuropathy     Type 2 diabetes mellitus without complication (CMS/HCC)     Non-ischemic cardiomyopathy (CMS/HCC)     CKD (chronic kidney disease) stage 3, GFR 30-59 ml/min (CMS/HCC)     Nonischemic cardiomyopathy (CMS/HCC)     Chronic left shoulder pain     Essential hypertension     Status post bilateral knee replacements     History of left hip replacement    SUBJECTIVE                                                                                                             Patient is a pleasant 74 y/o female that presents with a referral for Left shoulder pain, however she states that she has bilateral  shoulder pain and bilateral elbow pain due to arthritic changes. She mentions she was previously seen in physical therapy for Left shoulder pain and had improvements but that she lost her home exercise sheet and regressed. She had injections that helped for about 3 weeks but the pain returned and she was informed by another provider that she is not a candidate for a shoulder replacement. Was working with a nurse practitioner who prescribed a topical which helps for a short period of time and she is using it 3 times a day. She has a history of bilateral total knee arthroplasty and a total hip arthroplasty along with widespread osteoarthritis. She notes that she has painful cracking and popping. Her main goal is to get back to using her walker and being more mobile without the use of an electric wheelchair.   Pain / Symptoms:  Pain/symptom is: constant  Pain rating (out of 10): Current: 3 ; Best: 1Location: Posterior and lateral shoulder from glenohumeral joint to elbow.    Quality / Description: ache.  Alleviating Factors: prescription medications, topical agents/patch.   Progression since onset: no change  Function:   Limitations / Exacerbation Factors: pain, difficulty, grooming/hygiene/self-care activities, driving/riding in a vehicle, house/yard work, standing tasks, sitting tasks, reaching, lifting/carrying, walking and standing  Prior Level of Function: declining function, therefore referred to therapy,  Patient Goals: decreased pain, Be able to walk short distances with walker, reach with no pain.     Prior treatment: injections, outpatient PT  Discharged from hospital, home health, or skilled nursing facility in last 30 days: yes  Home Environment:   Patient lives with significant other  Type of home: single level home  Assistance available: consistent and as needed  Feels safe at home/work/school.  2 or more falls or an unexplained fall with injury in the last year:  No    OBJECTIVE                                                                                                                    Hand Dominance: right-handed;     Observation:   Cervical: forward head  Shoulder: rounded  Spine: increased thoracic kyphosis  Observed Gait:     Unable to assess gait due to patient using a power wheelchair.     Range of Motion (ROM)   (degrees unless noted; active unless noted; norms in ( ); negative=lacking to 0, positive=beyond 0)   Shoulder:     - Flexion (180):        • Left: 56 pain         • Right: 70 pain     - Abduction (180):        • Left: 61 pain         • Right: 78 pain    - Behind Back Internal Rotation:        • Left: posterior hip pain        • Right: Posterior hip pain    - Behind the Head External Rotation:        • Left: Left ear pain        • Right: posterior head pain  Details / Comments: Unable to assess passive range of motion due to pain and guarding.   Shoulder hiking compensation present with shoulder flexion and abduction.     Strength  (out of 5 unless noted, standard test position unless noted, lbs tested with hand held dynamometer)   Shoulder:     - Flexion:         • Left: 3+, pain         • Right: 3+, pain     - Extension:           • Left: 4+        • Right: 4+    - Abduction:        • Left: 3, pain        • Right: 3, pain    - Adduction:           • Left: pain and 3+        • Right: pain and 4    - Internal Rotation:          • Left (at 0°): pain, 3+        • Right (at 0°): pain, 4    - External Rotation:         • Left (at 0°): pain, 3        • Right (at 0°) :3, pain, 3+   Comments / Details: Strength testing done in neutral with arm at side due to range of motion limitations.       Palpation:   Left Upper Extremity: Levator Scapulae: hypertonic; Upper Trapezius: hypertonic and tender; Infraspinatus: hypertonic; Supraspinatus: hypertonic; Thoracic Paraspinals: hypertonic; lateral tenderness     Right Upper Extremity: Levator Scapulae: hypertonic; Upper Trapezius: hypertonic and tender;  Infraspinatus: hypertonic; Supraspinatus: hypertonic; Thoracic Paraspinals: hypertonic; lateral tenderness  Spine - Left: Cervical Paraspinals:hypertonic;   Spine - Right: Cervical Paraspinals:hypertonic;     Joint Play:   Comments/Details: Unable to assess due to patient not being able to get into testing position.    Special Tests:  Empty Can: Left: positive Right: positive  Chambers-Wolf: Left: positive Right: positive    Outcome Measures:   Quick Disabilities of the Arm, Shoulder and Hand: QuickDash Total Score: 65.91  (scored 0-100; a higher score indicates greater disability) see flowsheet for additional documentation  TREATMENT                                                                                                                initial evaluation completed    Neuromuscular Re-Education:  Shoulder Flexion Overhead with Dowel - 1 x daily - 7 x weekly - 2-3 sets - 10 reps  Seated Shoulder Abduction AAROM with Dowel - 1 x daily - 7 x weekly - 2-3 sets - 10 reps  Seated Shoulder External Rotation AAROM with Dowel - 1 x daily - 7 x weekly - 2-3 sets - 10 reps  Supine Isometric Shoulder Flexion x 10   Seated Scapular Retraction x 10  Education and cueing for preventing shoulder hiking compensation    Skilled input: verbal instruction/cues and tactile instruction/cues    Writer verbally educated and received verbal consent for hand placement, positioning of patient, and techniques to be performed today from patient for therapist position for techniques and hand placement and palpation for techniques as described above and how they are pertinent to the patient's plan of care.    Home Exercise Program: Access Code: [email or web address]  URL: [email or web address]  Date: 08/31/2021  Prepared by: Romario Youssef    Exercises  Shoulder Flexion Overhead with Dowel - 1 x daily - 7 x weekly - 2-3 sets - 10 reps  Seated Shoulder Abduction AAROM with Dowel - 1 x daily - 7 x weekly - 2-3 sets - 10  reps  Seated Shoulder External Rotation AAROM with Dowel - 1 x daily - 7 x weekly - 2-3 sets - 10 reps  Supine Isometric Shoulder Flexion - 1 x daily - 7 x weekly - 2-3 sets - 10 reps  Seated Scapular Retraction - 1 x daily - 7 x weekly - 2-3 sets - 10 reps     ASSESSMENT                                                                                                           75 year old female patient has reported functional limitations listed above impacted by signs and symptoms consistent with below   • Involved:       - left shoulder      - right shoulder   • Symptoms/impairments: increased pain/symptoms, impaired posture, impaired strength, impaired scapulohumeral rhythm, impaired range of motion, impaired tissue mobility, impaired mobility, impaired activity tolerance and impaired motor function/performance/coordination  Shonna presents today for physical therapy initial evaluation for Left shoulder pain, however she does have pain bilaterally and at bilateral elbows. She would like to focus on her shoulders though since they are the most limiting. Her signs and symptoms seem to be due to arthritic changes within the glenohumeral joints with audible crepitus that is painful, weakness, and significant deficits with range of motion in all directions. This is impacting her ability to perform household daily tasks due to her inability to perform adequate reaching along with her ambulation status using a walker because she can't tolerate weight bearing onto her upper extremities. She is currently using a power wheelchair for all mobility inside and outside of her home. Patient will benefit from skilled physical therapy services in order to address deficits noted above and return to prior level of functioning.     Prognosis: patient will benefit from skilled therapy  Rehabilitative potential is: good  Predicted patient presentation: Moderate (evolving) - Patient comorbidities and complexities, as defined above, may  have varying impact on steady progress for prescribed plan of care.  Patient Education:   Who will be receiving education: patient  Are they ready to learn: yes  Preferred learning style: demonstration, verbal and written  Barriers to learning: no barriers apparent at this time  Results of above outlined education: Verbalizes understanding      PLAN                                                                                                                         The following skilled interventions to be implemented to achieve goals listed below:  Activities of Daily Living/Self Care (08949)  Gait Training (55345)  Manual Therapy (57543)  Neuromuscular Re-Education (98717)  Therapeutic Activity (40927)  Therapeutic Exercise (46846)  Electrical Stimulation (03273//18175)  Heat/Cold (08558)    Frequency / Duration: 2 times per week tapering as patient progresses for an estimated total of 6 visits for 4 weeks    Patient involved in and agreed to plan of care and goals.  Suggestions for next session as indicated: Progress per plan of care      GOALS                                                                                                                           Patient to achieve a minimum of 90% range of motion of non-affected arm at the glenohumeral joint in all planes.  Patient to reduce pain to 2/10 or less.  Patient to achieve symmetrical manual muscle test in all planes of the glenohumeral joint.   The above improvements in impairments to assist in obtaining goals listed below  Long Term Goals: to be met by end of plan of care  1. Patient will complete independent upper body dressing and bathing with the affected limb at the same capacity as the non affected limb.  2. Patient will reach above head with proper scapulohumeral rhythm and without reported difficulty for completion of household tasks such as cleaning/dusting or storing light items overhead such as dishes.  3. Patient will be able to  lift 10 pounds from waist to eye level with proper body mechanics to assist with lifting household items overhead for home maintenance projects.    4. Patient will demonstrate ability to ambulate using her walker with tolerable pain/discomfort at her shoulders in order to improve functional mobility in her home.       Therapy procedure time and total treatment time can be found documented on the Time Entry flowsheet   DISPLAY PLAN FREE TEXT

## 2022-05-26 NOTE — ED PROVIDER NOTE - OBJECTIVE STATEMENT
67 y/o F copd on 3l nc, htn, dm, on eliquis afib p/w worsening sob. patient states she went to her cardiologist dr bautista who did an outpatient stress which was positive. pt endorsed she felt near syncopal and sob x few weeks. sent to the ed for cath in AM. otherwise pt denies any fever, chills, cp, palpitations, abdominal pain, v/d, numbness, back pain

## 2022-05-26 NOTE — H&P ADULT - NSICDXPASTMEDICALHX_GEN_ALL_CORE_FT
PAST MEDICAL HISTORY:  Essential hypertension     Gout     History of COPD On 3LNC sometimes at home    Paroxysmal atrial fibrillation      PAST MEDICAL HISTORY:  Anemia     Essential hypertension     Gout     History of COPD On 3LNC sometimes at home    SONU on CPAP     Paroxysmal atrial fibrillation     Stage 3 chronic kidney disease

## 2022-05-26 NOTE — H&P ADULT - HISTORY OF PRESENT ILLNESS
66F w/ pmhx of CODP on 3l nc sometimes at home, htn, Afib, DVT on eliquis, CKD, gout, HLD p/w worsening sob. Patient states she went to her cardiologist Dr. Donald who did an outpatient stress which was positive. She endorsed that she felt near syncopal and sob x few weeks. Was visiting office today and was sent to the ed for cath in AM by ambulance. Otherwise pt denies any fever, chills, cp, palpitations, abdominal pain, v/d, numbness, back pain. Wants to have a BM currently.    In ER: Given Mg 2gm IV

## 2022-05-26 NOTE — ED PROVIDER NOTE - NS ED ROS FT
Constitutional: No fever, chills.  Eyes:  No visual changes  ENMT:  No neck pain  Cardiac:  No chest pain  Respiratory:  No cough, +SOB  GI:  No nausea, vomiting, diarrhea, abdominal pain.  :  No dysuria, hematuria  MS:  No back pain.  Neuro:  +lightheadedness  Skin:  No skin rash  Except as documented in the HPI,  all other systems are negative.

## 2022-05-26 NOTE — ED ADULT NURSE NOTE - INTERVENTIONS DEFINITIONS
[place] to call system/Call bell, personal items and telephone within reach/Instruct patient to call for assistance/Non-slip footwear when patient is off stretcher/Physically safe environment: no spills, clutter or unnecessary equipment/Stretcher in lowest position, wheels locked, appropriate side rails in place/Provide visual cue, wrist band, yellow gown, etc./Reinforce activity limits and safety measures with patient and family/Provide visual clues: red socks

## 2022-05-26 NOTE — H&P ADULT - NSHPPHYSICALEXAM_GEN_ALL_CORE
Vital Signs Last 24 Hrs  T(C): 36.6 (05-26-22 @ 17:15), Max: 36.9 (05-26-22 @ 15:57)  T(F): 97.9 (05-26-22 @ 17:15), Max: 98.4 (05-26-22 @ 15:57)  HR: 70 (05-26-22 @ 17:15) (63 - 70)  BP: 139/65 (05-26-22 @ 17:15) (129/64 - 139/65)  BP(mean): --  RR: 18 (05-26-22 @ 17:15) (17 - 18)  SpO2: 100% (05-26-22 @ 17:15) (98% - 100%)

## 2022-05-26 NOTE — H&P ADULT - NSHPLABSRESULTS_GEN_ALL_CORE
I have reviewed the labs, imaging and ekg. EKG with NSR HR 69, QTc 473 1st degree block, non-specific ST segment findings. CXR w/ prominent hilar markings

## 2022-05-27 LAB
ALBUMIN SERPL ELPH-MCNC: 3.8 G/DL — SIGNIFICANT CHANGE UP (ref 3.3–5)
ALP SERPL-CCNC: 224 U/L — HIGH (ref 40–120)
ALT FLD-CCNC: 21 U/L — SIGNIFICANT CHANGE UP (ref 10–45)
ANION GAP SERPL CALC-SCNC: 16 MMOL/L — SIGNIFICANT CHANGE UP (ref 5–17)
AST SERPL-CCNC: 32 U/L — SIGNIFICANT CHANGE UP (ref 10–40)
BASOPHILS # BLD AUTO: 0.01 K/UL — SIGNIFICANT CHANGE UP (ref 0–0.2)
BASOPHILS NFR BLD AUTO: 0.2 % — SIGNIFICANT CHANGE UP (ref 0–2)
BILIRUB SERPL-MCNC: 0.3 MG/DL — SIGNIFICANT CHANGE UP (ref 0.2–1.2)
BUN SERPL-MCNC: 23 MG/DL — SIGNIFICANT CHANGE UP (ref 7–23)
CALCIUM SERPL-MCNC: 7.1 MG/DL — LOW (ref 8.4–10.5)
CHLORIDE SERPL-SCNC: 110 MMOL/L — HIGH (ref 96–108)
CO2 SERPL-SCNC: 14 MMOL/L — LOW (ref 22–31)
CREAT SERPL-MCNC: 2.53 MG/DL — HIGH (ref 0.5–1.3)
EGFR: 21 ML/MIN/1.73M2 — LOW
EOSINOPHIL # BLD AUTO: 0.1 K/UL — SIGNIFICANT CHANGE UP (ref 0–0.5)
EOSINOPHIL NFR BLD AUTO: 1.7 % — SIGNIFICANT CHANGE UP (ref 0–6)
GLUCOSE BLDC GLUCOMTR-MCNC: 121 MG/DL — HIGH (ref 70–99)
GLUCOSE BLDC GLUCOMTR-MCNC: 149 MG/DL — HIGH (ref 70–99)
GLUCOSE SERPL-MCNC: 99 MG/DL — SIGNIFICANT CHANGE UP (ref 70–99)
HCT VFR BLD CALC: 30.7 % — LOW (ref 34.5–45)
HCV AB S/CO SERPL IA: 0.15 S/CO — SIGNIFICANT CHANGE UP (ref 0–0.99)
HCV AB SERPL-IMP: SIGNIFICANT CHANGE UP
HGB BLD-MCNC: 9.3 G/DL — LOW (ref 11.5–15.5)
IMM GRANULOCYTES NFR BLD AUTO: 0.3 % — SIGNIFICANT CHANGE UP (ref 0–1.5)
LYMPHOCYTES # BLD AUTO: 1.57 K/UL — SIGNIFICANT CHANGE UP (ref 1–3.3)
LYMPHOCYTES # BLD AUTO: 26.6 % — SIGNIFICANT CHANGE UP (ref 13–44)
MAGNESIUM SERPL-MCNC: 1.8 MG/DL — SIGNIFICANT CHANGE UP (ref 1.6–2.6)
MCHC RBC-ENTMCNC: 30.3 GM/DL — LOW (ref 32–36)
MCHC RBC-ENTMCNC: 32.3 PG — SIGNIFICANT CHANGE UP (ref 27–34)
MCV RBC AUTO: 106.6 FL — HIGH (ref 80–100)
MONOCYTES # BLD AUTO: 0.28 K/UL — SIGNIFICANT CHANGE UP (ref 0–0.9)
MONOCYTES NFR BLD AUTO: 4.7 % — SIGNIFICANT CHANGE UP (ref 2–14)
NEUTROPHILS # BLD AUTO: 3.92 K/UL — SIGNIFICANT CHANGE UP (ref 1.8–7.4)
NEUTROPHILS NFR BLD AUTO: 66.5 % — SIGNIFICANT CHANGE UP (ref 43–77)
NRBC # BLD: 0 /100 WBCS — SIGNIFICANT CHANGE UP (ref 0–0)
PLATELET # BLD AUTO: 214 K/UL — SIGNIFICANT CHANGE UP (ref 150–400)
POTASSIUM SERPL-MCNC: 3.4 MMOL/L — LOW (ref 3.5–5.3)
POTASSIUM SERPL-SCNC: 3.4 MMOL/L — LOW (ref 3.5–5.3)
PROT SERPL-MCNC: 7.6 G/DL — SIGNIFICANT CHANGE UP (ref 6–8.3)
RBC # BLD: 2.88 M/UL — LOW (ref 3.8–5.2)
RBC # FLD: 14.8 % — HIGH (ref 10.3–14.5)
SODIUM SERPL-SCNC: 140 MMOL/L — SIGNIFICANT CHANGE UP (ref 135–145)
WBC # BLD: 5.9 K/UL — SIGNIFICANT CHANGE UP (ref 3.8–10.5)
WBC # FLD AUTO: 5.9 K/UL — SIGNIFICANT CHANGE UP (ref 3.8–10.5)

## 2022-05-27 PROCEDURE — 93458 L HRT ARTERY/VENTRICLE ANGIO: CPT | Mod: 26

## 2022-05-27 PROCEDURE — 93010 ELECTROCARDIOGRAM REPORT: CPT

## 2022-05-27 PROCEDURE — 99152 MOD SED SAME PHYS/QHP 5/>YRS: CPT

## 2022-05-27 RX ORDER — ALBUTEROL 90 UG/1
1 AEROSOL, METERED ORAL
Refills: 0 | Status: DISCONTINUED | OUTPATIENT
Start: 2022-05-27 | End: 2022-05-31

## 2022-05-27 RX ORDER — IPRATROPIUM/ALBUTEROL SULFATE 18-103MCG
0 AEROSOL WITH ADAPTER (GRAM) INHALATION
Qty: 0 | Refills: 0 | DISCHARGE

## 2022-05-27 RX ORDER — SODIUM CHLORIDE 9 MG/ML
250 INJECTION INTRAMUSCULAR; INTRAVENOUS; SUBCUTANEOUS
Refills: 0 | Status: DISCONTINUED | OUTPATIENT
Start: 2022-05-27 | End: 2022-05-27

## 2022-05-27 RX ORDER — FLUTICASONE PROPIONATE 50 MCG
1 SPRAY, SUSPENSION NASAL
Refills: 0 | Status: DISCONTINUED | OUTPATIENT
Start: 2022-05-27 | End: 2022-05-31

## 2022-05-27 RX ORDER — POTASSIUM CHLORIDE 20 MEQ
20 PACKET (EA) ORAL
Refills: 0 | Status: COMPLETED | OUTPATIENT
Start: 2022-05-27 | End: 2022-05-27

## 2022-05-27 RX ORDER — ALBUTEROL 90 UG/1
0 AEROSOL, METERED ORAL
Qty: 0 | Refills: 0 | DISCHARGE

## 2022-05-27 RX ORDER — FOLIC ACID 0.8 MG
1 TABLET ORAL
Qty: 0 | Refills: 0 | DISCHARGE

## 2022-05-27 RX ORDER — AMLODIPINE BESYLATE 2.5 MG/1
1 TABLET ORAL
Qty: 0 | Refills: 0 | DISCHARGE

## 2022-05-27 RX ORDER — LEVOTHYROXINE SODIUM 125 MCG
1 TABLET ORAL
Qty: 0 | Refills: 0 | DISCHARGE

## 2022-05-27 RX ORDER — PREGABALIN 225 MG/1
1 CAPSULE ORAL
Qty: 0 | Refills: 0 | DISCHARGE

## 2022-05-27 RX ORDER — SODIUM CHLORIDE 9 MG/ML
250 INJECTION INTRAMUSCULAR; INTRAVENOUS; SUBCUTANEOUS ONCE
Refills: 0 | Status: COMPLETED | OUTPATIENT
Start: 2022-05-27 | End: 2022-05-27

## 2022-05-27 RX ORDER — ALLOPURINOL 300 MG
1 TABLET ORAL
Qty: 0 | Refills: 0 | DISCHARGE

## 2022-05-27 RX ORDER — COLCHICINE 0.6 MG
0 TABLET ORAL
Qty: 0 | Refills: 0 | DISCHARGE

## 2022-05-27 RX ADMIN — Medication 1 SPRAY(S): at 17:11

## 2022-05-27 RX ADMIN — PANTOPRAZOLE SODIUM 40 MILLIGRAM(S): 20 TABLET, DELAYED RELEASE ORAL at 05:15

## 2022-05-27 RX ADMIN — Medication 20 MILLIEQUIVALENT(S): at 17:11

## 2022-05-27 RX ADMIN — Medication 20 MILLIEQUIVALENT(S): at 15:38

## 2022-05-27 RX ADMIN — Medication 1 MILLIGRAM(S): at 12:30

## 2022-05-27 RX ADMIN — SODIUM CHLORIDE 500 MILLILITER(S): 9 INJECTION INTRAMUSCULAR; INTRAVENOUS; SUBCUTANEOUS at 14:00

## 2022-05-27 RX ADMIN — Medication 100 MICROGRAM(S): at 05:12

## 2022-05-27 RX ADMIN — Medication 300 MILLIGRAM(S): at 12:30

## 2022-05-27 NOTE — PHARMACOTHERAPY INTERVENTION NOTE - COMMENTS
Medication reconciliation completed. Please refer to specifics in home medication list (outpatient medication review). Medications verified with patient and [facility].    Home Medications:  allopurinol 300 mg oral tablet: 1 tab(s) orally once a day   atorvastatin 80 mg oral tablet: 1 tab(s) orally once a day   colchicine 0.6 mg oral tablet: 1 tab(s) orally once a day, As Needed   Eliquis 5 mg oral tablet: 1 tab(s) orally 2 times a day   fluticasone 50 mcg/inh nasal spray: 1 spray(s) nasal once a day   folic acid 1 mg oral tablet: 1 tab(s) orally once a day   levothyroxine 100 mcg (0.1 mg) oral tablet: 1 tab(s) orally once a day   NIFEdipine 30 mg oral tablet, extended release: 1 tab(s) orally once a day  omeprazole 40 mg oral delayed release capsule: 1 cap(s) orally once a day   Trelegy Ellipta 100 mcg-62.5 mcg-25 mcg/inh inhalation powder: 1 puff(s) inhaled once a day   Vitamin B12 1000 mcg/mL injectable solution: 1  injectable once a month     Notes:  1) Patient is on atorvastatin and Trelegy Ellipta. Recommend reconciling with atorvastatin, Symbicort, and Spiriva Handihaler.  2) Patient states that she should not be on nifedipine because it makes her blood pressure drop and she has been having falls.  3) Patient states that she is on vitamin B12 injection once a month and would like to switch to tablets instead.    Time spent: 10 minutes     Kalpesh FarrellD  PGY-1 Pharmacy Resident  [facility] #[id number]

## 2022-05-27 NOTE — CHART NOTE - NSCHARTNOTEFT_GEN_A_CORE
pt only given IV bolus of 250cc to prevent OLAMIDE, but unable to get full hydration protocol given mild fluid vol ol and plan to start diuretics after cath today.

## 2022-05-27 NOTE — PROGRESS NOTE ADULT - ASSESSMENT
66F w/ pmhx of COPD on 3L nc sometimes at home, HTN, Afib, DVT on eliquis, CKD, gout, HLD p/w worsening sob and positive stress test    SOB (shortness of breath).   - Recent positive stress test. 5/23 TTE EF 5055%, mild hypertrophy, moderate aortic regurgitation.   - s/p LHC with no obstruction.  - Telemetry  - F/u cardiology recommendations  - nephrology consult Dr. Worthy    COPD, severe.   - On 3LNC at home which she states she does not always use. Has CPAP  - On Trelegy at home  - Cont. supplemental 02 as needed  - Pt with wheelchair at bedside.    Paroxysmal atrial fibrillation.   - Restart eliquis after cath in am.    Essential hypertension.   - Trend BP    Stage 3 chronic kidney disease.   - Crt 2.60 close to baseline  - Trend BMP  - Renal dose medications.  - Nephrology evaluation    Anemia.   - Hgb 8-10 at baseline  - Trend cbc.    Gout.   - Cont. allopurinol.    Hypothyroid.   - Cont. levothyroxine.    DVT prophylaxis.   - DVT PPx  - Pt on eliquis.    Jason Murphy MD phone [phone]

## 2022-05-27 NOTE — PROGRESS NOTE ADULT - SUBJECTIVE AND OBJECTIVE BOX
Patient is a 66y old  Female who presents with a chief complaint of Abnormal stress test (27 May 2022 07:06)      SUBJECTIVE / OVERNIGHT EVENTS: Comfortable without new complaints. s/p cath diagnostic.   Review of Systems  chest pain no  palpitations no  sob no  nausea no  headache no    MEDICATIONS  (STANDING):  ALBUTerol    90 MICROgram(s) HFA Inhaler 1 Puff(s) Inhalation two times a day  allopurinol 300 milliGRAM(s) Oral daily  fluticasone propionate 50 MICROgram(s)/spray Nasal Spray 1 Spray(s) Both Nostrils two times a day  folic acid 1 milliGRAM(s) Oral daily  levothyroxine 100 MICROGram(s) Oral daily  pantoprazole    Tablet 40 milliGRAM(s) Oral before breakfast    MEDICATIONS  (PRN):  acetaminophen     Tablet .. 650 milliGRAM(s) Oral every 6 hours PRN Temp greater or equal to 38C (100.4F), Mild Pain (1 - 3)  melatonin 3 milliGRAM(s) Oral at bedtime PRN Insomnia  ondansetron Injectable 4 milliGRAM(s) IV Push every 8 hours PRN Nausea and/or Vomiting      Vital Signs Last 24 Hrs  T(C): 36.9 (27 May 2022 20:37), Max: 37.3 (27 May 2022 00:40)  T(F): 98.5 (27 May 2022 20:37), Max: 99.2 (27 May 2022 00:40)  HR: 67 (27 May 2022 20:37) (57 - 90)  BP: 100/54 (27 May 2022 20:37) (100/54 - 146/64)  BP(mean): 81 (26 May 2022 21:01) (81 - 81)  RR: 18 (27 May 2022 20:37) (15 - 25)  SpO2: 100% (27 May 2022 20:37) (98% - 100%)    PHYSICAL EXAM:  GENERAL: NAD, well-developed  HEAD:  Atraumatic, Normocephalic  EYES: EOMI, PERRLA, conjunctiva and sclera clear  NECK: Supple, No JVD  CHEST/LUNG: Clear to auscultation bilaterally; No wheeze  HEART: Regular rate and rhythm; No murmurs, rubs, or gallops  ABDOMEN: Soft, Nontender, Nondistended; Bowel sounds present  EXTREMITIES:  2+ Peripheral Pulses, No clubbing, cyanosis, or edema  PSYCH: AAOx3  NEUROLOGY: non-focal  SKIN: No rashes or lesions    LABS:                        9.3    5.90  )-----------( 214      ( 27 May 2022 07:10 )             30.7     05-27    140  |  110<H>  |  23  ----------------------------<  99  3.4<L>   |  14<L>  |  2.53<H>    Ca    7.1<L>      27 May 2022 07:15  Mg     1.8     05-27    TPro  7.6  /  Alb  3.8  /  TBili  0.3  /  DBili  x   /  AST  32  /  ALT  21  /  AlkPhos  224<H>  05-27                RADIOLOGY & ADDITIONAL TESTS:    Imaging Personally Reviewed:    Consultant(s) Notes Reviewed:      Care Discussed with Consultants/Other Providers:

## 2022-05-27 NOTE — PATIENT PROFILE ADULT - FALL HARM RISK - HARM RISK INTERVENTIONS
Assistance with ambulation/Assistance OOB with selected safe patient handling equipment/Communicate Risk of Fall with Harm to all staff/Discuss with provider need for PT consult/Monitor gait and stability/Provide patient with walking aids - walker, cane, crutches/Reinforce activity limits and safety measures with patient and family/Tailored Fall Risk Interventions/Use of alarms - bed, chair and/or voice tab/Visual Cue: Yellow wristband and red socks/Bed in lowest position, wheels locked, appropriate side rails in place/Call bell, personal items and telephone in reach/Instruct patient to call for assistance before getting out of bed or chair/Non-slip footwear when patient is out of bed/[place] to call system/Physically safe environment - no spills, clutter or unnecessary equipment/Purposeful Proactive Rounding/Room/bathroom lighting operational, light cord in reach

## 2022-05-27 NOTE — CONSULT NOTE ADULT - SUBJECTIVE AND OBJECTIVE BOX
CHIEF COMPLAINT: sob    HISTORY OF PRESENT ILLNESS:  66F w/ pmhx of CODP on 3l nc sometimes at home, htn, Afib, DVT on eliquis, CKD, gout, HLD p/w worsening sob. Patient states she went to her cardiologist Dr. Donald who did an outpatient stress which was positive. She endorsed that she felt near syncopal and sob x few weeks. Was visiting office today and was sent to the ed for cath in AM by ambulance. Otherwise pt denies any fever, chills, cp, palpitations, abdominal pain, v/d, numbness, back pain. Wants to have a BM currently.      Allergies    No Known Allergies    Intolerances    	    MEDICATIONS:        acetaminophen     Tablet .. 650 milliGRAM(s) Oral every 6 hours PRN  melatonin 3 milliGRAM(s) Oral at bedtime PRN  ondansetron Injectable 4 milliGRAM(s) IV Push every 8 hours PRN    pantoprazole    Tablet 40 milliGRAM(s) Oral before breakfast    allopurinol 300 milliGRAM(s) Oral daily  levothyroxine 100 MICROGram(s) Oral daily    folic acid 1 milliGRAM(s) Oral daily      PAST MEDICAL & SURGICAL HISTORY:  History of COPD  On 3LNC sometimes at home      Paroxysmal atrial fibrillation      Essential hypertension      Gout      SONU on CPAP      Stage 3 chronic kidney disease      Anemia      H/O bariatric surgery          FAMILY HISTORY:  No pertinent family history in first degree relatives        SOCIAL HISTORY:    former smoker. indep in adl    REVIEW OF SYSTEMS:  See HPI, otherwise complete 10 point review of systems negative    [ ] All others negative	      PHYSICAL EXAM:  T(C): 36.4 (05-27-22 @ 05:05), Max: 37.3 (05-27-22 @ 00:40)  HR: 63 (05-27-22 @ 05:05) (63 - 90)  BP: 134/72 (05-27-22 @ 05:05) (129/64 - 144/78)  RR: 18 (05-27-22 @ 05:05) (17 - 20)  SpO2: 100% (05-27-22 @ 05:05) (98% - 100%)  Wt(kg): --  I&O's Summary      Appearance: No Acute Distress	  HEENT:  Normal oral mucosa, PERRL, EOMI	  Cardiovascular: Normal S1 S2, No JVD, No murmurs/rubs/gallops  Respiratory: Lungs clear to auscultation bilaterally  Gastrointestinal:  Soft, Non-tender, + BS	  Skin: No rashes, No ecchymoses, No cyanosis	  Neurologic: Non-focal  Extremities: No clubbing, cyanosis or edema  Vascular: Peripheral pulses palpable 2+ bilaterally  Psychiatry: A & O x 3, Mood & affect appropriate    Laboratory Data:	 	    CBC Full  -  ( 26 May 2022 17:27 )  WBC Count : 6.74 K/uL  Hemoglobin : 8.9 g/dL  Hematocrit : 29.2 %  Platelet Count - Automated : 236 K/uL  Mean Cell Volume : 107.4 fl  Mean Cell Hemoglobin : 32.7 pg  Mean Cell Hemoglobin Concentration : 30.5 gm/dL  Auto Neutrophil # : 4.74 K/uL  Auto Lymphocyte # : 1.52 K/uL  Auto Monocyte # : 0.47 K/uL  Auto Eosinophil # : 0.00 K/uL  Auto Basophil # : 0.00 K/uL  Auto Neutrophil % : 70.4 %  Auto Lymphocyte % : 22.6 %  Auto Monocyte % : 7.0 %  Auto Eosinophil % : 0.0 %  Auto Basophil % : 0.0 %    05-26    138  |  110<H>  |  25<H>  ----------------------------<  114<H>  3.8   |  14<L>  |  2.60<H>    Ca    6.9<L>      26 May 2022 17:27  Mg     1.5     05-26    TPro  7.4  /  Alb  4.0  /  TBili  0.2  /  DBili  x   /  AST  37  /  ALT  22  /  AlkPhos  216<H>  05-26      proBNP: Serum Pro-Brain Natriuretic Peptide: 1052 pg/mL (05-26 @ 17:27)    Lipid Profile:   HgA1c:   TSH:       CARDIAC MARKERS:            Interpretation of Telemetry: 	    ECG:  	nsr lvh apcs  RADIOLOGY:  OTHER: 	    PREVIOUS DIAGNOSTIC TESTING:    [ ] Echocardiogram:  [ ] Catheterization:  [ ] Stress Test:  	    Assessment:  unstable angina  pos nst  acute on chronic diastolic HF  pAF  hx of PE/DVT  dizziness  CKD  COPD  HTN  HLD    Recs:  cardiac stable  no e/o acs by ecg or biomarkers  mildly vol up. plan to initiate diuretics after cardiac cath. check 2d echo  cw antiplateles, statin and antianginals  hold AC in anticipation of LHC. resume when able  tele monitoring given c/o near syncope  resume bronchodilators  renal follow up  Advanced care planning forms were discussed. Code status including forceful chest compressions, defibrillation and intubation were discussed. The risks benefits and alternatives to pertinent cardiac procedures and interventions were discussed in detail and all questions were answered. Duration: 15 minutes.      Greater than 90 minutes spent on total encounter; more than 50% of the visit was spent counseling and/or coordinating care by the attending physician.   	  Prakash Rees MD   Cardiovascular Diseases  [phone]

## 2022-05-28 LAB
ANION GAP SERPL CALC-SCNC: 13 MMOL/L — SIGNIFICANT CHANGE UP (ref 5–17)
BUN SERPL-MCNC: 19 MG/DL — SIGNIFICANT CHANGE UP (ref 7–23)
CALCIUM SERPL-MCNC: 7.1 MG/DL — LOW (ref 8.4–10.5)
CHLORIDE SERPL-SCNC: 112 MMOL/L — HIGH (ref 96–108)
CO2 SERPL-SCNC: 17 MMOL/L — LOW (ref 22–31)
CREAT SERPL-MCNC: 2.54 MG/DL — HIGH (ref 0.5–1.3)
EGFR: 21 ML/MIN/1.73M2 — LOW
GLUCOSE SERPL-MCNC: 93 MG/DL — SIGNIFICANT CHANGE UP (ref 70–99)
HCT VFR BLD CALC: 29.7 % — LOW (ref 34.5–45)
HGB BLD-MCNC: 8.9 G/DL — LOW (ref 11.5–15.5)
MCHC RBC-ENTMCNC: 30 GM/DL — LOW (ref 32–36)
MCHC RBC-ENTMCNC: 32.7 PG — SIGNIFICANT CHANGE UP (ref 27–34)
MCV RBC AUTO: 109.2 FL — HIGH (ref 80–100)
NRBC # BLD: 0 /100 WBCS — SIGNIFICANT CHANGE UP (ref 0–0)
PLATELET # BLD AUTO: 190 K/UL — SIGNIFICANT CHANGE UP (ref 150–400)
POTASSIUM SERPL-MCNC: 3.9 MMOL/L — SIGNIFICANT CHANGE UP (ref 3.5–5.3)
POTASSIUM SERPL-SCNC: 3.9 MMOL/L — SIGNIFICANT CHANGE UP (ref 3.5–5.3)
RBC # BLD: 2.72 M/UL — LOW (ref 3.8–5.2)
RBC # FLD: 15 % — HIGH (ref 10.3–14.5)
SODIUM SERPL-SCNC: 142 MMOL/L — SIGNIFICANT CHANGE UP (ref 135–145)
TSH SERPL-MCNC: 7.98 UIU/ML — HIGH (ref 0.27–4.2)
WBC # BLD: 4.25 K/UL — SIGNIFICANT CHANGE UP (ref 3.8–10.5)
WBC # FLD AUTO: 4.25 K/UL — SIGNIFICANT CHANGE UP (ref 3.8–10.5)

## 2022-05-28 RX ORDER — APIXABAN 2.5 MG/1
5 TABLET, FILM COATED ORAL
Refills: 0 | Status: DISCONTINUED | OUTPATIENT
Start: 2022-05-28 | End: 2022-05-31

## 2022-05-28 RX ORDER — SODIUM BICARBONATE 1 MEQ/ML
650 SYRINGE (ML) INTRAVENOUS
Refills: 0 | Status: DISCONTINUED | OUTPATIENT
Start: 2022-05-28 | End: 2022-05-30

## 2022-05-28 RX ORDER — BUMETANIDE 0.25 MG/ML
0.5 INJECTION INTRAMUSCULAR; INTRAVENOUS DAILY
Refills: 0 | Status: DISCONTINUED | OUTPATIENT
Start: 2022-05-28 | End: 2022-05-28

## 2022-05-28 RX ORDER — LEVOTHYROXINE SODIUM 125 MCG
125 TABLET ORAL DAILY
Refills: 0 | Status: DISCONTINUED | OUTPATIENT
Start: 2022-05-28 | End: 2022-05-31

## 2022-05-28 RX ORDER — DARBEPOETIN ALFA IN POLYSORBAT 200MCG/0.4
80 PEN INJECTOR (ML) SUBCUTANEOUS ONCE
Refills: 0 | Status: COMPLETED | OUTPATIENT
Start: 2022-05-28 | End: 2022-05-30

## 2022-05-28 RX ORDER — BUMETANIDE 0.25 MG/ML
1 INJECTION INTRAMUSCULAR; INTRAVENOUS DAILY
Refills: 0 | Status: DISCONTINUED | OUTPATIENT
Start: 2022-05-29 | End: 2022-05-31

## 2022-05-28 RX ORDER — CALCITRIOL 0.5 UG/1
0.25 CAPSULE ORAL DAILY
Refills: 0 | Status: DISCONTINUED | OUTPATIENT
Start: 2022-05-28 | End: 2022-05-31

## 2022-05-28 RX ADMIN — PANTOPRAZOLE SODIUM 40 MILLIGRAM(S): 20 TABLET, DELAYED RELEASE ORAL at 06:45

## 2022-05-28 RX ADMIN — Medication 650 MILLIGRAM(S): at 18:38

## 2022-05-28 RX ADMIN — Medication 100 MICROGRAM(S): at 06:44

## 2022-05-28 RX ADMIN — Medication 1 SPRAY(S): at 18:38

## 2022-05-28 RX ADMIN — Medication 1 SPRAY(S): at 06:45

## 2022-05-28 RX ADMIN — Medication 1 MILLIGRAM(S): at 11:58

## 2022-05-28 RX ADMIN — Medication 300 MILLIGRAM(S): at 11:58

## 2022-05-28 RX ADMIN — APIXABAN 5 MILLIGRAM(S): 2.5 TABLET, FILM COATED ORAL at 18:38

## 2022-05-28 NOTE — PROGRESS NOTE ADULT - ASSESSMENT
66F w/ pmhx of COPD on 3L nc sometimes at home, HTN, Afib, DVT on eliquis, CKD, gout, HLD p/w worsening sob and positive stress test    SOB (shortness of breath).   - Recent positive stress test. 5/23 TTE EF 5055%, mild hypertrophy, moderate aortic regurgitation.   - s/p LHC with no obstruction.  - Telemetry  - F/u cardiology recommendations  - nephrology consult Dr. Worthy    COPD, severe.   - On 3LNC at home which she states she does not always use. Has CPAP  - On Trelegy at home  - Cont. supplemental 02 as needed  - Pt with wheelchair at bedside.    Paroxysmal atrial fibrillation.   - Restart eliquis after cath in am.    Essential hypertension.   - Trend BP    Stage 3 chronic kidney disease.   - Crt 2.60 close to baseline  - Trend BMP  - Renal dose medications.  - Nephrology evaluation    Anemia.   - Hgb 8-10 at baseline  - Trend cbc.    Gout.   - Cont. allopurinol.    Hypothyroid.   - Cont. levothyroxine. Increase to 125 mcg daily.    Dizziness  - MR head     DVT prophylaxis.   - DVT PPx  - Pt on eliquis.    Jason Murphy MD phone [phone]

## 2022-05-28 NOTE — PROGRESS NOTE ADULT - SUBJECTIVE AND OBJECTIVE BOX
Patient is a 66y old  Female who presents with a chief complaint of Abnormal stress test (28 May 2022 14:12)      SUBJECTIVE / OVERNIGHT EVENTS: c/o dizziness   Review of Systems  chest pain no  palpitations no  sob no  nausea no  headache no    MEDICATIONS  (STANDING):  ALBUTerol    90 MICROgram(s) HFA Inhaler 1 Puff(s) Inhalation two times a day  allopurinol 300 milliGRAM(s) Oral daily  apixaban 5 milliGRAM(s) Oral two times a day  buMETAnide 0.5 milliGRAM(s) Oral daily  fluticasone propionate 50 MICROgram(s)/spray Nasal Spray 1 Spray(s) Both Nostrils two times a day  folic acid 1 milliGRAM(s) Oral daily  levothyroxine 100 MICROGram(s) Oral daily  pantoprazole    Tablet 40 milliGRAM(s) Oral before breakfast    MEDICATIONS  (PRN):  acetaminophen     Tablet .. 650 milliGRAM(s) Oral every 6 hours PRN Temp greater or equal to 38C (100.4F), Mild Pain (1 - 3)  melatonin 3 milliGRAM(s) Oral at bedtime PRN Insomnia  ondansetron Injectable 4 milliGRAM(s) IV Push every 8 hours PRN Nausea and/or Vomiting      Vital Signs Last 24 Hrs  T(C): 36.4 (28 May 2022 11:53), Max: 36.9 (27 May 2022 20:37)  T(F): 97.5 (28 May 2022 11:53), Max: 98.5 (27 May 2022 20:37)  HR: 86 (28 May 2022 11:53) (57 - 86)  BP: 123/73 (28 May 2022 11:53) (100/54 - 134/64)  BP(mean): --  RR: 18 (28 May 2022 11:53) (15 - 20)  SpO2: 100% (28 May 2022 11:53) (100% - 100%)    PHYSICAL EXAM:  GENERAL: NAD, well-developed  HEAD:  Atraumatic, Normocephalic  EYES: EOMI, PERRLA, conjunctiva and sclera clear  NECK: Supple, No JVD  CHEST/LUNG: Clear to auscultation bilaterally; No wheeze  HEART: Regular rate and rhythm; No murmurs, rubs, or gallops  ABDOMEN: Soft, Nontender, Nondistended; Bowel sounds present  EXTREMITIES:  2+ Peripheral Pulses, No clubbing, cyanosis, or edema  PSYCH: AAOx3  NEUROLOGY: non-focal  SKIN: No rashes or lesions    LABS:                        8.9    4.25  )-----------( 190      ( 28 May 2022 07:56 )             29.7     05-28    142  |  112<H>  |  19  ----------------------------<  93  3.9   |  17<L>  |  2.54<H>    Ca    7.1<L>      28 May 2022 07:56  Mg     1.8     05-27    TPro  7.6  /  Alb  3.8  /  TBili  0.3  /  DBili  x   /  AST  32  /  ALT  21  /  AlkPhos  224<H>  05-27                RADIOLOGY & ADDITIONAL TESTS:    Imaging Personally Reviewed:    Consultant(s) Notes Reviewed:      Care Discussed with Consultants/Other Providers:

## 2022-05-28 NOTE — CONSULT NOTE ADULT - SUBJECTIVE AND OBJECTIVE BOX
Patient is a 66y old  Female who presents with a chief complaint of Abnormal stress test (28 May 2022 08:59)      HPI:  66F w/ pmhx of CODP on 3l nc sometimes at home, htn, Afib, DVT on eliquis, CKD, gout, HLD p/w worsening sob. Patient states she went to her cardiologist Dr. Donald who did an outpatient stress which was positive. She endorsed that she felt near syncopal and sob x few weeks. Was visiting office today and was sent to the ed for cath.  Patient is  s/p C with normal coronaries. Nephrology consulted for elevated creatinine .  Otherwise pt denies any fever, chills, cp, palpitations, abdominal pain, v/d, numbness, back pain.   In ER: Given Mg 2gm IV (26 May 2022 20:58)      PAST MEDICAL & SURGICAL HISTORY:  HTN (hypertension)      Gout      Hypothyroid      Asthma      HSV (herpes simplex virus) infection      SOB (shortness of breath)  R/T inhalation of chemicals at work ( [employer] )      History of COPD  On 3LNC sometimes at home      Paroxysmal atrial fibrillation      Essential hypertension      Gout      SONU on CPAP      Stage 3 chronic kidney disease      Anemia      Surgery, elective  right hand - 3 fingers repairs      H/O bariatric surgery          MEDICATIONS  (STANDING):  ALBUTerol    90 MICROgram(s) HFA Inhaler 1 Puff(s) Inhalation two times a day  allopurinol 300 milliGRAM(s) Oral daily  apixaban 5 milliGRAM(s) Oral two times a day  buMETAnide 0.5 milliGRAM(s) Oral daily  fluticasone propionate 50 MICROgram(s)/spray Nasal Spray 1 Spray(s) Both Nostrils two times a day  folic acid 1 milliGRAM(s) Oral daily  levothyroxine 100 MICROGram(s) Oral daily  pantoprazole    Tablet 40 milliGRAM(s) Oral before breakfast      Allergies    No Known Allergies    Intolerances        SOCIAL HISTORY:  Denies ETOh,Smoking,     FAMILY HISTORY:  No pertinent family history in first degree relatives        REVIEW OF SYSTEMS:  CONSTITUTIONAL: No weakness, fevers or chills  EYES/ENT: No visual changes;  No vertigo or throat pain   NECK: No pain or stiffness  RESPIRATORY: No cough, wheezing, hemoptysis; No shortness of breath  CARDIOVASCULAR: No chest pain or palpitations  GASTROINTESTINAL: No abdominal or epigastric pain. No nausea, vomiting, or hematemesis; No diarrhea or constipation. No melena or hematochezia.  GENITOURINARY: No dysuria, frequency or hematuria  NEUROLOGICAL: No numbness or weakness  SKIN: No itching, burning, rashes, or lesions   All other review of systems is negative unless indicated above.    VITAL:  T(C): , Max: 36.9 (05-27-22 @ 20:37)  T(F): , Max: 98.5 (05-27-22 @ 20:37)  HR: 86 (05-28-22 @ 11:53)  BP: 123/73 (05-28-22 @ 11:53)  BP(mean): --  RR: 18 (05-28-22 @ 11:53)  SpO2: 100% (05-28-22 @ 11:53)  Wt(kg): --    PHYSICAL EXAM:  Constitutional: NAD, Alert  HEENT: NCAT, MMM  Neck: Supple, No JVD  Respiratory: CTA-b/l  Cardiovascular: RRR s1s2, no m/r/g  Gastrointestinal: BS+, soft, NT/ND  Extremities: No peripheral edema b/l  Neurological: no focal deficits; strength grossly intact  Back: no CVAT b/l  Skin: No rashes, no nevi    LABS:                        8.9    4.25  )-----------( 190      ( 28 May 2022 07:56 )             29.7     Na(142)/K(3.9)/Cl(112)/HCO3(17)/BUN(19)/Cr(2.54)Glu(93)/Ca(7.1)/Mg(--)/PO4(--)    05-28 @ 07:56  Na(140)/K(3.4)/Cl(110)/HCO3(14)/BUN(23)/Cr(2.53)Glu(99)/Ca(7.1)/Mg(1.8)/PO4(--)    05-27 @ 07:15  Na(138)/K(3.8)/Cl(110)/HCO3(14)/BUN(25)/Cr(2.60)Glu(114)/Ca(6.9)/Mg(1.5)/PO4(--)    05-26 @ 17:27            IMAGING:    ASSESSMENT:      RECOMMEND:      Thank you for involving [facility] in this patient's care.    With warm regards,    Ayah Henley MD   [facility]  Office: [phone]           Patient is a 66y old  Female who presents with a chief complaint of Abnormal stress test (28 May 2022 08:59)      HPI:  66F w/ pmhx of CODP on 3l nc sometimes at home, htn, Afib, DVT on eliquis, CKD, gout, HLD presented with  worsening sob. Patient states she went to her cardiologist Dr. Donald who did an outpatient stress which was positive. She endorsed that she felt near syncopal and sob x few weeks at the office visit  and was sent to the ed for cath.  Patient is now  s/p C on 5/27/2022  with normal coronaries. Nephrology consulted for elevated creatinine. Patient states that she has kidney dysfunction. She has followed up with Dr. Paulino Willis/Nephrologist occasionally in the past. No chronic NSAID use. Otherwise pt denies any fever, chills, cp, palpitations, abdominal pain, v/d, numbness, back pain. States she was urinating less         PAST MEDICAL & SURGICAL HISTORY:  HTN (hypertension)      Gout      Hypothyroid      Asthma      HSV (herpes simplex virus) infection      SOB (shortness of breath)  R/T inhalation of chemicals at work ( [employer] )      History of COPD  On 3LNC sometimes at home      Paroxysmal atrial fibrillation      Essential hypertension      Gout      SONU on CPAP      Stage 3 chronic kidney disease      Anemia      Surgery, elective  right hand - 3 fingers repairs      H/O bariatric surgery          MEDICATIONS  (STANDING):  ALBUTerol    90 MICROgram(s) HFA Inhaler 1 Puff(s) Inhalation two times a day  allopurinol 300 milliGRAM(s) Oral daily  apixaban 5 milliGRAM(s) Oral two times a day  buMETAnide 0.5 milliGRAM(s) Oral daily  fluticasone propionate 50 MICROgram(s)/spray Nasal Spray 1 Spray(s) Both Nostrils two times a day  folic acid 1 milliGRAM(s) Oral daily  levothyroxine 100 MICROGram(s) Oral daily  pantoprazole    Tablet 40 milliGRAM(s) Oral before breakfast      Allergies    No Known Allergies    Intolerances        SOCIAL HISTORY:  Denies ETOh,Smoking,     FAMILY HISTORY:  No pertinent family history in first degree relatives        REVIEW OF SYSTEMS:  CONSTITUTIONAL: No weakness, fevers or chills  EYES/ENT: No visual changes;  No vertigo or throat pain   NECK: No pain or stiffness  RESPIRATORY: No cough, wheezing, hemoptysis; No shortness of breath  CARDIOVASCULAR: No chest pain or palpitations  GASTROINTESTINAL: No abdominal or epigastric pain. No nausea, vomiting, or hematemesis; No diarrhea or constipation. No melena or hematochezia.  GENITOURINARY: No dysuria, frequency or hematuria  NEUROLOGICAL: No numbness or weakness  SKIN: No itching, burning, rashes, or lesions   All other review of systems is negative unless indicated above.    VITAL:  T(C): , Max: 36.9 (05-27-22 @ 20:37)  T(F): , Max: 98.5 (05-27-22 @ 20:37)  HR: 86 (05-28-22 @ 11:53)  BP: 123/73 (05-28-22 @ 11:53)  BP(mean): --  RR: 18 (05-28-22 @ 11:53)  SpO2: 100% (05-28-22 @ 11:53)  Wt(kg): --    PHYSICAL EXAM:  Constitutional: NAD, Alert  HEENT: NCAT, MMM  Neck: Supple, No JVD  Respiratory: CTA-b/l  Cardiovascular: RRR s1s2, no m/r/g  Gastrointestinal: BS+, soft, NT/ND  Extremities: No peripheral edema b/l  Urology- External catheter     LABS:                        8.9    4.25  )-----------( 190      ( 28 May 2022 07:56 )             29.7     Na(142)/K(3.9)/Cl(112)/HCO3(17)/BUN(19)/Cr(2.54)Glu(93)/Ca(7.1)/Mg(--)/PO4(--)    05-28 @ 07:56  Na(140)/K(3.4)/Cl(110)/HCO3(14)/BUN(23)/Cr(2.53)Glu(99)/Ca(7.1)/Mg(1.8)/PO4(--)    05-27 @ 07:15  Na(138)/K(3.8)/Cl(110)/HCO3(14)/BUN(25)/Cr(2.60)Glu(114)/Ca(6.9)/Mg(1.5)/PO4(--)    05-26 @ 17:27            IMAGING:    ASSESSMENT:  66F w/ pmhx of CODP on 3l nc sometimes at home, htn, Afib, DVT on eliquis, CKD, gout, HLD presented with  worsening sob now s/p C with normal coronaries on 5/27/2022     CKD vs VY - baseline Cr ?   Mild clinical hypervolemia  Blood pressure controlled  Gap acidosis due to CKD/VY   Anemia of CKD   s/p [facility] with normal coronaries on 5/27/2022     RECOMMEND:  Increase bumex from .5 > 1 mg daily   Start sodium bicarbonate 650 mg BID  Give Aranesp 80 mcg subcutaneous one dose today    Check urine protein   Dose meds for CR CL 20 ml/min   Check post void bladder scan   Thank you for involving [facility] in this patient's care.    With warm regards,    Ayah Henley MD   [facility]  Office: [phone]           Patient is a 66y old  Female who presents with a chief complaint of Abnormal stress test (28 May 2022 08:59)      HPI:  66F w/ pmhx of CODP on 3l nc sometimes at home, htn, Afib, DVT on eliquis, CKD, gout, HLD presented with  worsening sob. Patient states she went to her cardiologist Dr. Donald who did an outpatient stress which was positive. She endorsed that she felt near syncopal and sob x few weeks at the office visit  and was sent to the ed for cath.  Patient is now  s/p C on 5/27/2022  with normal coronaries. Nephrology consulted for elevated creatinine. Patient states that she has kidney dysfunction. She has followed up with Dr. Paulino Willis/Nephrologist occasionally in the past. No chronic NSAID use. Otherwise pt denies any fever, chills, cp, palpitations, abdominal pain, v/d, numbness, back pain. States she was urinating less         PAST MEDICAL & SURGICAL HISTORY:  HTN (hypertension)      Gout      Hypothyroid      Asthma      HSV (herpes simplex virus) infection      SOB (shortness of breath)  R/T inhalation of chemicals at work ( [employer] )      History of COPD  On 3LNC sometimes at home      Paroxysmal atrial fibrillation      Essential hypertension      Gout      SONU on CPAP      Stage 3 chronic kidney disease      Anemia      Surgery, elective  right hand - 3 fingers repairs      H/O bariatric surgery          MEDICATIONS  (STANDING):  ALBUTerol    90 MICROgram(s) HFA Inhaler 1 Puff(s) Inhalation two times a day  allopurinol 300 milliGRAM(s) Oral daily  apixaban 5 milliGRAM(s) Oral two times a day  buMETAnide 0.5 milliGRAM(s) Oral daily  fluticasone propionate 50 MICROgram(s)/spray Nasal Spray 1 Spray(s) Both Nostrils two times a day  folic acid 1 milliGRAM(s) Oral daily  levothyroxine 100 MICROGram(s) Oral daily  pantoprazole    Tablet 40 milliGRAM(s) Oral before breakfast      Allergies    No Known Allergies    Intolerances        SOCIAL HISTORY:  Denies ETOh,Smoking,     FAMILY HISTORY:  No pertinent family history in first degree relatives        REVIEW OF SYSTEMS:  CONSTITUTIONAL: No weakness, fevers or chills  EYES/ENT: No visual changes;  No vertigo or throat pain   NECK: No pain or stiffness  RESPIRATORY: No cough, wheezing, hemoptysis; No shortness of breath  CARDIOVASCULAR: No chest pain or palpitations  GASTROINTESTINAL: No abdominal or epigastric pain. No nausea, vomiting, or hematemesis; No diarrhea or constipation. No melena or hematochezia.  GENITOURINARY: No dysuria, frequency or hematuria  NEUROLOGICAL: No numbness or weakness  SKIN: No itching, burning, rashes, or lesions   All other review of systems is negative unless indicated above.    VITAL:  T(C): , Max: 36.9 (05-27-22 @ 20:37)  T(F): , Max: 98.5 (05-27-22 @ 20:37)  HR: 86 (05-28-22 @ 11:53)  BP: 123/73 (05-28-22 @ 11:53)  BP(mean): --  RR: 18 (05-28-22 @ 11:53)  SpO2: 100% (05-28-22 @ 11:53)  Wt(kg): --    PHYSICAL EXAM:  Constitutional: NAD, Alert  HEENT: NCAT, MMM  Neck: Supple, No JVD  Respiratory: CTA-b/l  Cardiovascular: RRR s1s2, no m/r/g  Gastrointestinal: BS+, soft, NT/ND  Extremities: No peripheral edema b/l  Urology- External catheter     LABS:                        8.9    4.25  )-----------( 190      ( 28 May 2022 07:56 )             29.7     Na(142)/K(3.9)/Cl(112)/HCO3(17)/BUN(19)/Cr(2.54)Glu(93)/Ca(7.1)/Mg(--)/PO4(--)    05-28 @ 07:56  Na(140)/K(3.4)/Cl(110)/HCO3(14)/BUN(23)/Cr(2.53)Glu(99)/Ca(7.1)/Mg(1.8)/PO4(--)    05-27 @ 07:15  Na(138)/K(3.8)/Cl(110)/HCO3(14)/BUN(25)/Cr(2.60)Glu(114)/Ca(6.9)/Mg(1.5)/PO4(--)    05-26 @ 17:27            IMAGING:    ASSESSMENT:  66F w/ pmhx of CODP on 3l nc sometimes at home, htn, Afib, DVT on eliquis, CKD, gout, HLD presented with  worsening sob now s/p C with normal coronaries on 5/27/2022     CKD vs VY - baseline Cr ?   Mild clinical hypervolemia  Blood pressure controlled  Gap acidosis due to CKD/VY   Anemia of CKD   s/p C with normal coronaries on 5/27/2022     RECOMMEND:  Increase bumex from .5 > 1 mg daily   Start sodium bicarbonate 650 mg BID  Give Aranesp 80 mcg subcutaneous one dose today    Check urine protein   Dose meds for CR CL 20 ml/min   calcitriol .25 mcg MWF PO    Thank you for involving [facility] in this patient's care.    With warm regards,    Ayah Henley MD   [facility]  Office: [phone]

## 2022-05-28 NOTE — PROGRESS NOTE ADULT - SUBJECTIVE AND OBJECTIVE BOX
Cardiovascular Disease Progress Note    Overnight events: No acute events overnight.  s/p lhc, normal cors. no cp/sob/palps  Otherwise review of systems negative    Objective Findings:  T(C): 36.3 (05-28-22 @ 04:29), Max: 36.9 (05-27-22 @ 20:37)  HR: 63 (05-28-22 @ 04:29) (57 - 67)  BP: 119/51 (05-28-22 @ 04:29) (100/54 - 146/64)  RR: 18 (05-28-22 @ 04:29) (15 - 25)  SpO2: 100% (05-28-22 @ 04:29) (100% - 100%)  Wt(kg): --  Daily Height in cm: 170.18 (28 May 2022 08:04)    Daily       Physical Exam:  Gen: NAD  HEENT: EOMI  CV: RRR, normal S1 + S2, no m/r/g  Lungs: CTAB  Abd: soft, non-tender  Ext: No edema    Telemetry: nsr apc    Laboratory Data:                        8.9    4.25  )-----------( 190      ( 28 May 2022 07:56 )             29.7     05-28    142  |  112<H>  |  19  ----------------------------<  93  3.9   |  17<L>  |  2.54<H>    Ca    7.1<L>      28 May 2022 07:56  Mg     1.8     05-27    TPro  7.6  /  Alb  3.8  /  TBili  0.3  /  DBili  x   /  AST  32  /  ALT  21  /  AlkPhos  224<H>  05-27              Inpatient Medications:  MEDICATIONS  (STANDING):  ALBUTerol    90 MICROgram(s) HFA Inhaler 1 Puff(s) Inhalation two times a day  allopurinol 300 milliGRAM(s) Oral daily  buMETAnide 0.5 milliGRAM(s) Oral daily  fluticasone propionate 50 MICROgram(s)/spray Nasal Spray 1 Spray(s) Both Nostrils two times a day  folic acid 1 milliGRAM(s) Oral daily  levothyroxine 100 MICROGram(s) Oral daily  pantoprazole    Tablet 40 milliGRAM(s) Oral before breakfast      Assessment:  unstable angina  pos nst  acute on chronic diastolic HF  pAF  hx of PE/DVT  dizziness  CKD  COPD  HTN  HLD    Recs:  cardiac stable  no e/o acs by ecg or biomarkers. s/p lhc normal cors  mildly vol up. start bumex 0.5mg qd. check 2d echo  resume AC for hx of DVT/PE  tele monitoring given c/o near syncope  resume bronchodilators  renal follow up        Over 35 minutes spent on total encounter; more than 50% of the visit was spent counseling and/or coordinating care by the attending physician.      Prakash Rees MD   Cardiovascular Disease  [phone]

## 2022-05-29 LAB
ANION GAP SERPL CALC-SCNC: 13 MMOL/L — SIGNIFICANT CHANGE UP (ref 5–17)
BUN SERPL-MCNC: 18 MG/DL — SIGNIFICANT CHANGE UP (ref 7–23)
CALCIUM SERPL-MCNC: 7.3 MG/DL — LOW (ref 8.4–10.5)
CHLORIDE SERPL-SCNC: 114 MMOL/L — HIGH (ref 96–108)
CO2 SERPL-SCNC: 16 MMOL/L — LOW (ref 22–31)
CREAT SERPL-MCNC: 2.32 MG/DL — HIGH (ref 0.5–1.3)
EGFR: 23 ML/MIN/1.73M2 — LOW
GLUCOSE SERPL-MCNC: 85 MG/DL — SIGNIFICANT CHANGE UP (ref 70–99)
HCT VFR BLD CALC: 30.1 % — LOW (ref 34.5–45)
HGB BLD-MCNC: 9.1 G/DL — LOW (ref 11.5–15.5)
MCHC RBC-ENTMCNC: 30.2 GM/DL — LOW (ref 32–36)
MCHC RBC-ENTMCNC: 32.9 PG — SIGNIFICANT CHANGE UP (ref 27–34)
MCV RBC AUTO: 108.7 FL — HIGH (ref 80–100)
NRBC # BLD: 0 /100 WBCS — SIGNIFICANT CHANGE UP (ref 0–0)
PLATELET # BLD AUTO: 183 K/UL — SIGNIFICANT CHANGE UP (ref 150–400)
POTASSIUM SERPL-MCNC: 3.7 MMOL/L — SIGNIFICANT CHANGE UP (ref 3.5–5.3)
POTASSIUM SERPL-SCNC: 3.7 MMOL/L — SIGNIFICANT CHANGE UP (ref 3.5–5.3)
RBC # BLD: 2.77 M/UL — LOW (ref 3.8–5.2)
RBC # FLD: 14.8 % — HIGH (ref 10.3–14.5)
SODIUM SERPL-SCNC: 143 MMOL/L — SIGNIFICANT CHANGE UP (ref 135–145)
WBC # BLD: 4.67 K/UL — SIGNIFICANT CHANGE UP (ref 3.8–10.5)
WBC # FLD AUTO: 4.67 K/UL — SIGNIFICANT CHANGE UP (ref 3.8–10.5)

## 2022-05-29 PROCEDURE — 70551 MRI BRAIN STEM W/O DYE: CPT | Mod: 26

## 2022-05-29 RX ORDER — SIMETHICONE 80 MG/1
80 TABLET, CHEWABLE ORAL ONCE
Refills: 0 | Status: COMPLETED | OUTPATIENT
Start: 2022-05-29 | End: 2022-05-29

## 2022-05-29 RX ADMIN — ONDANSETRON 4 MILLIGRAM(S): 8 TABLET, FILM COATED ORAL at 14:04

## 2022-05-29 RX ADMIN — PANTOPRAZOLE SODIUM 40 MILLIGRAM(S): 20 TABLET, DELAYED RELEASE ORAL at 05:39

## 2022-05-29 RX ADMIN — APIXABAN 5 MILLIGRAM(S): 2.5 TABLET, FILM COATED ORAL at 17:00

## 2022-05-29 RX ADMIN — Medication 1 SPRAY(S): at 05:38

## 2022-05-29 RX ADMIN — CALCITRIOL 0.25 MICROGRAM(S): 0.5 CAPSULE ORAL at 11:21

## 2022-05-29 RX ADMIN — Medication 1 MILLIGRAM(S): at 11:21

## 2022-05-29 RX ADMIN — Medication 650 MILLIGRAM(S): at 17:03

## 2022-05-29 RX ADMIN — APIXABAN 5 MILLIGRAM(S): 2.5 TABLET, FILM COATED ORAL at 05:40

## 2022-05-29 RX ADMIN — Medication 300 MILLIGRAM(S): at 11:21

## 2022-05-29 RX ADMIN — SIMETHICONE 80 MILLIGRAM(S): 80 TABLET, CHEWABLE ORAL at 18:17

## 2022-05-29 RX ADMIN — Medication 1 SPRAY(S): at 17:00

## 2022-05-29 RX ADMIN — Medication 3 MILLIGRAM(S): at 21:56

## 2022-05-29 RX ADMIN — BUMETANIDE 1 MILLIGRAM(S): 0.25 INJECTION INTRAMUSCULAR; INTRAVENOUS at 05:39

## 2022-05-29 RX ADMIN — Medication 650 MILLIGRAM(S): at 05:39

## 2022-05-29 RX ADMIN — Medication 125 MICROGRAM(S): at 05:40

## 2022-05-29 NOTE — PROGRESS NOTE ADULT - ASSESSMENT
ASSESSMENT:  66F w/ pmhx of CODP on 3l nc sometimes at home, htn, Afib, DVT on eliquis, CKD, gout, HLD presented with  worsening sob now s/p C with normal coronaries on 5/27/2022     CKD vs VY - baseline Cr ?   Mild clinical hypervolemia  Blood pressure controlled  Gap acidosis due to CKD/VY   Anemia of CKD   s/p [facility] with normal coronaries on 5/27/2022     RECOMMEND:  Continue 1 mg po daily   Continue sodium bicarbonate 650 mg BID  Give Aranesp 80 mcg subcutaneous one dose today    Check urine protein   Calcitriol .25 mcg po MWF   Dose meds for CR CL 20 ml/min     SANDRA HernandezC  [facility]  [phone]      ASSESSMENT:  66F w/ pmhx of CODP on 3l nc sometimes at home, htn, Afib, DVT on eliquis, CKD, gout, HLD presented with  worsening sob now s/p C with normal coronaries on 5/27/2022     CKD vs VY - baseline Cr ?   Mild clinical hypervolemia  Blood pressure controlled  Gap acidosis due to CKD/VY   Anemia of CKD   s/p [facility] with normal coronaries on 5/27/2022     RECOMMEND:  Continue Bumex 1 mg po daily   Continue sodium bicarbonate 650 mg BID  Give Aranesp 80 mcg subcutaneous one dose today    Check urine protein   Calcitriol .25 mcg po MWF   Dose meds for CR CL 20 ml/min     Lucrecia San NP-C  [facility]  [phone]

## 2022-05-29 NOTE — PROGRESS NOTE ADULT - SUBJECTIVE AND OBJECTIVE BOX
Patient is a 66y old  Female who presents with a chief complaint of Abnormal stress test (29 May 2022 08:05)      SUBJECTIVE / OVERNIGHT EVENTS: c/o dizziness  Review of Systems  chest pain no  palpitations no  sob no  nausea no  headache no    MEDICATIONS  (STANDING):  ALBUTerol    90 MICROgram(s) HFA Inhaler 1 Puff(s) Inhalation two times a day  allopurinol 300 milliGRAM(s) Oral daily  apixaban 5 milliGRAM(s) Oral two times a day  buMETAnide 1 milliGRAM(s) Oral daily  calcitriol   Capsule 0.25 MICROGram(s) Oral daily  darbepoetin Injectable ViaL 80 MICROGram(s) SubCutaneous once  fluticasone propionate 50 MICROgram(s)/spray Nasal Spray 1 Spray(s) Both Nostrils two times a day  folic acid 1 milliGRAM(s) Oral daily  levothyroxine 125 MICROGram(s) Oral daily  pantoprazole    Tablet 40 milliGRAM(s) Oral before breakfast  sodium bicarbonate 650 milliGRAM(s) Oral two times a day    MEDICATIONS  (PRN):  acetaminophen     Tablet .. 650 milliGRAM(s) Oral every 6 hours PRN Temp greater or equal to 38C (100.4F), Mild Pain (1 - 3)  melatonin 3 milliGRAM(s) Oral at bedtime PRN Insomnia  ondansetron Injectable 4 milliGRAM(s) IV Push every 8 hours PRN Nausea and/or Vomiting      Vital Signs Last 24 Hrs  T(C): 36.4 (29 May 2022 11:16), Max: 37 (28 May 2022 20:58)  T(F): 97.5 (29 May 2022 11:16), Max: 98.6 (28 May 2022 20:58)  HR: 73 (29 May 2022 11:16) (67 - 73)  BP: 136/68 (29 May 2022 11:16) (107/62 - 136/68)  BP(mean): --  RR: 18 (29 May 2022 11:16) (18 - 18)  SpO2: 100% (29 May 2022 11:16) (100% - 100%)    PHYSICAL EXAM:  GENERAL: NAD, well-developed  HEAD:  Atraumatic, Normocephalic  EYES: EOMI, PERRLA, conjunctiva and sclera clear  NECK: Supple, No JVD  CHEST/LUNG: Clear to auscultation bilaterally; No wheeze  HEART: Regular rate and rhythm; No murmurs, rubs, or gallops  ABDOMEN: Soft, Nontender, Nondistended; Bowel sounds present  EXTREMITIES:  2+ Peripheral Pulses, No clubbing, cyanosis, or edema  PSYCH: AAOx3  NEUROLOGY: non-focal  SKIN: No rashes or lesions    LABS:                        9.1    4.67  )-----------( 183      ( 29 May 2022 06:14 )             30.1     05-29    143  |  114<H>  |  18  ----------------------------<  85  3.7   |  16<L>  |  2.32<H>    Ca    7.3<L>      29 May 2022 06:14                  RADIOLOGY & ADDITIONAL TESTS:    Imaging Personally Reviewed:    Consultant(s) Notes Reviewed:      Care Discussed with Consultants/Other Providers:

## 2022-05-29 NOTE — PROGRESS NOTE ADULT - ASSESSMENT
66F w/ pmhx of COPD on 3L nc sometimes at home, HTN, Afib, DVT on eliquis, CKD, gout, HLD p/w worsening sob and positive stress test    SOB (shortness of breath).   - Recent positive stress test. 5/23 TTE EF 5055%, mild hypertrophy, moderate aortic regurgitation.   - s/p LHC with no obstruction.  - Telemetry  - F/u cardiology recommendations  - nephrology follow    COPD, severe.   - On 3LNC at home which she states she does not always use. Has CPAP  - On Trelegy at home  - Cont. supplemental 02 as needed  - Pt with wheelchair at bedside.    Paroxysmal atrial fibrillation.   - Restart eliquis.    Essential hypertension.   - Trend BP    Stage 3 chronic kidney disease.   - Crt 2.60 close to baseline  - Trend BMP  - Renal dose medications.  - Nephrology evaluation    Anemia.   - Hgb 8-10 at baseline  - Trend cbc.    Gout.   - Cont. allopurinol.    Hypothyroid.   - Cont. levothyroxine. Increase to 125 mcg daily.    Dizziness  - MR head pending     DVT prophylaxis.   - DVT PPx  - Pt on eliquis.    Jason Murphy MD phone [phone]

## 2022-05-29 NOTE — PROGRESS NOTE ADULT - SUBJECTIVE AND OBJECTIVE BOX
NEPHROLOGY     Patient seen and examined.    MEDICATIONS  (STANDING):  ALBUTerol    90 MICROgram(s) HFA Inhaler 1 Puff(s) Inhalation two times a day  allopurinol 300 milliGRAM(s) Oral daily  apixaban 5 milliGRAM(s) Oral two times a day  buMETAnide 1 milliGRAM(s) Oral daily  calcitriol   Capsule 0.25 MICROGram(s) Oral daily  darbepoetin Injectable ViaL 80 MICROGram(s) SubCutaneous once  fluticasone propionate 50 MICROgram(s)/spray Nasal Spray 1 Spray(s) Both Nostrils two times a day  folic acid 1 milliGRAM(s) Oral daily  levothyroxine 125 MICROGram(s) Oral daily  pantoprazole    Tablet 40 milliGRAM(s) Oral before breakfast  sodium bicarbonate 650 milliGRAM(s) Oral two times a day    VITALS:  T(C): , Max: 37 (05-28-22 @ 20:58)  T(F): , Max: 98.6 (05-28-22 @ 20:58)  HR: 69 (05-29-22 @ 04:31)  BP: 107/62 (05-29-22 @ 04:31)  RR: 18 (05-29-22 @ 04:31)  SpO2: 100% (05-29-22 @ 04:31)    I and O's:    05-28 @ 07:01  -  05-29 @ 07:00  --------------------------------------------------------  IN: 860 mL / OUT: 150 mL / NET: 710 mL    PHYSICAL EXAM:  Constitutional: NAD, Alert  HEENT: NCAT, MMM  Neck: Supple, No JVD  Respiratory: CTA-b/l  Cardiovascular: RRR s1s2, no m/r/g  Gastrointestinal: BS+, soft, NT/ND  Extremities: No peripheral edema b/l  Urology- + External catheter     LABS:                        9.1    4.67  )-----------( 183      ( 29 May 2022 06:14 )             30.1     05-29    143  |  114<H>  |  18  ----------------------------<  85  3.7   |  16<L>  |  2.32<H>    Ca    7.3<L>      29 May 2022 06:14   NEPHROLOGY     Patient seen and examined resting comfortably in bed states she just finished breakfast. She reports of some lightheadedness, denies pain, no sob, comfortable on NC, currently in no acute distress.     MEDICATIONS  (STANDING):  ALBUTerol    90 MICROgram(s) HFA Inhaler 1 Puff(s) Inhalation two times a day  allopurinol 300 milliGRAM(s) Oral daily  apixaban 5 milliGRAM(s) Oral two times a day  buMETAnide 1 milliGRAM(s) Oral daily  calcitriol   Capsule 0.25 MICROGram(s) Oral daily  darbepoetin Injectable ViaL 80 MICROGram(s) SubCutaneous once  fluticasone propionate 50 MICROgram(s)/spray Nasal Spray 1 Spray(s) Both Nostrils two times a day  folic acid 1 milliGRAM(s) Oral daily  levothyroxine 125 MICROGram(s) Oral daily  pantoprazole    Tablet 40 milliGRAM(s) Oral before breakfast  sodium bicarbonate 650 milliGRAM(s) Oral two times a day    VITALS:  T(C): , Max: 37 (05-28-22 @ 20:58)  T(F): , Max: 98.6 (05-28-22 @ 20:58)  HR: 69 (05-29-22 @ 04:31)  BP: 107/62 (05-29-22 @ 04:31)  RR: 18 (05-29-22 @ 04:31)  SpO2: 100% (05-29-22 @ 04:31)    I and O's:    05-28 @ 07:01  -  05-29 @ 07:00  --------------------------------------------------------  IN: 860 mL / OUT: 150 mL / NET: 710 mL    PHYSICAL EXAM:  Constitutional: NAD, Alert  HEENT: NCAT, MMM  Neck: Supple, No JVD  Respiratory: CTA-b/l  Cardiovascular: RRR s1s2, no m/r/g  Gastrointestinal: BS+, soft, NT/ND  Extremities: No peripheral edema b/l  Urology- + Ext catheter     LABS:                        9.1    4.67  )-----------( 183      ( 29 May 2022 06:14 )             30.1     05-29    143  |  114<H>  |  18  ----------------------------<  85  3.7   |  16<L>  |  2.32<H>    Ca    7.3<L>      29 May 2022 06:14

## 2022-05-30 LAB
ANION GAP SERPL CALC-SCNC: 13 MMOL/L — SIGNIFICANT CHANGE UP (ref 5–17)
APPEARANCE UR: ABNORMAL
BACTERIA # UR AUTO: ABNORMAL
BILIRUB UR-MCNC: NEGATIVE — SIGNIFICANT CHANGE UP
BUN SERPL-MCNC: 18 MG/DL — SIGNIFICANT CHANGE UP (ref 7–23)
CALCIUM SERPL-MCNC: 7.3 MG/DL — LOW (ref 8.4–10.5)
CHLORIDE SERPL-SCNC: 112 MMOL/L — HIGH (ref 96–108)
CO2 SERPL-SCNC: 16 MMOL/L — LOW (ref 22–31)
COLOR SPEC: YELLOW — SIGNIFICANT CHANGE UP
CREAT ?TM UR-MCNC: 136 MG/DL — SIGNIFICANT CHANGE UP
CREAT SERPL-MCNC: 2.17 MG/DL — HIGH (ref 0.5–1.3)
DIFF PNL FLD: NEGATIVE — SIGNIFICANT CHANGE UP
EGFR: 25 ML/MIN/1.73M2 — LOW
EPI CELLS # UR: 12 /HPF — HIGH
GLUCOSE SERPL-MCNC: 91 MG/DL — SIGNIFICANT CHANGE UP (ref 70–99)
GLUCOSE UR QL: NEGATIVE — SIGNIFICANT CHANGE UP
HCT VFR BLD CALC: 27.6 % — LOW (ref 34.5–45)
HGB BLD-MCNC: 8.2 G/DL — LOW (ref 11.5–15.5)
HYALINE CASTS # UR AUTO: 1 /LPF — SIGNIFICANT CHANGE UP (ref 0–2)
KETONES UR-MCNC: NEGATIVE — SIGNIFICANT CHANGE UP
LEUKOCYTE ESTERASE UR-ACNC: ABNORMAL
MCHC RBC-ENTMCNC: 29.7 GM/DL — LOW (ref 32–36)
MCHC RBC-ENTMCNC: 32.3 PG — SIGNIFICANT CHANGE UP (ref 27–34)
MCV RBC AUTO: 108.7 FL — HIGH (ref 80–100)
NITRITE UR-MCNC: NEGATIVE — SIGNIFICANT CHANGE UP
NRBC # BLD: 0 /100 WBCS — SIGNIFICANT CHANGE UP (ref 0–0)
PH UR: 6 — SIGNIFICANT CHANGE UP (ref 5–8)
PLATELET # BLD AUTO: 178 K/UL — SIGNIFICANT CHANGE UP (ref 150–400)
POTASSIUM SERPL-MCNC: 4.1 MMOL/L — SIGNIFICANT CHANGE UP (ref 3.5–5.3)
POTASSIUM SERPL-SCNC: 4.1 MMOL/L — SIGNIFICANT CHANGE UP (ref 3.5–5.3)
PROT ?TM UR-MCNC: 28 MG/DL — HIGH (ref 0–12)
PROT ?TM UR-MCNC: 29 MG/DL — HIGH (ref 0–12)
PROT UR-MCNC: ABNORMAL
PROT/CREAT UR-RTO: 0.2 RATIO — SIGNIFICANT CHANGE UP (ref 0–0.2)
RBC # BLD: 2.54 M/UL — LOW (ref 3.8–5.2)
RBC # FLD: 14.8 % — HIGH (ref 10.3–14.5)
RBC CASTS # UR COMP ASSIST: 0 /HPF — SIGNIFICANT CHANGE UP (ref 0–4)
SODIUM SERPL-SCNC: 141 MMOL/L — SIGNIFICANT CHANGE UP (ref 135–145)
SP GR SPEC: 1.01 — SIGNIFICANT CHANGE UP (ref 1.01–1.02)
UROBILINOGEN FLD QL: NEGATIVE — SIGNIFICANT CHANGE UP
WBC # BLD: 5.4 K/UL — SIGNIFICANT CHANGE UP (ref 3.8–10.5)
WBC # FLD AUTO: 5.4 K/UL — SIGNIFICANT CHANGE UP (ref 3.8–10.5)
WBC UR QL: 17 /HPF — HIGH (ref 0–5)

## 2022-05-30 RX ORDER — SODIUM BICARBONATE 1 MEQ/ML
650 SYRINGE (ML) INTRAVENOUS THREE TIMES A DAY
Refills: 0 | Status: DISCONTINUED | OUTPATIENT
Start: 2022-05-30 | End: 2022-05-31

## 2022-05-30 RX ADMIN — Medication 650 MILLIGRAM(S): at 21:34

## 2022-05-30 RX ADMIN — CALCITRIOL 0.25 MICROGRAM(S): 0.5 CAPSULE ORAL at 13:23

## 2022-05-30 RX ADMIN — APIXABAN 5 MILLIGRAM(S): 2.5 TABLET, FILM COATED ORAL at 05:28

## 2022-05-30 RX ADMIN — PANTOPRAZOLE SODIUM 40 MILLIGRAM(S): 20 TABLET, DELAYED RELEASE ORAL at 05:28

## 2022-05-30 RX ADMIN — Medication 1 SPRAY(S): at 05:28

## 2022-05-30 RX ADMIN — Medication 1 SPRAY(S): at 18:28

## 2022-05-30 RX ADMIN — Medication 125 MICROGRAM(S): at 05:28

## 2022-05-30 RX ADMIN — APIXABAN 5 MILLIGRAM(S): 2.5 TABLET, FILM COATED ORAL at 18:28

## 2022-05-30 RX ADMIN — Medication 80 MICROGRAM(S): at 18:28

## 2022-05-30 RX ADMIN — Medication 1 MILLIGRAM(S): at 13:23

## 2022-05-30 RX ADMIN — Medication 3 MILLIGRAM(S): at 23:55

## 2022-05-30 RX ADMIN — Medication 650 MILLIGRAM(S): at 05:28

## 2022-05-30 RX ADMIN — Medication 650 MILLIGRAM(S): at 13:23

## 2022-05-30 RX ADMIN — BUMETANIDE 1 MILLIGRAM(S): 0.25 INJECTION INTRAMUSCULAR; INTRAVENOUS at 05:28

## 2022-05-30 RX ADMIN — Medication 300 MILLIGRAM(S): at 13:23

## 2022-05-30 NOTE — PROGRESS NOTE ADULT - ASSESSMENT
66F w/ pmhx of COPD on 3L nc sometimes at home, HTN, Afib, DVT on eliquis, CKD, gout, HLD p/w worsening sob and positive stress test    SOB (shortness of breath).   - Recent positive stress test. 5/23 TTE EF 5055%, mild hypertrophy, moderate aortic regurgitation.   - s/p LHC with no obstruction.  - Telemetry  - F/u cardiology recommendations  - nephrology follow    COPD, severe.   - On 3LNC at home which she states she does not always use. Has CPAP  - On Trelegy at home  - Cont. supplemental 02 as needed  - Pt with wheelchair at bedside.    Paroxysmal atrial fibrillation.   - eliquis.    Essential hypertension.   - Trend BP    Stage 3 chronic kidney disease.   - Crt 2.60 close to baseline  - Trend BMP  - Renal dose medications.  - Nephrology evaluation    Anemia.   - Hgb 8-10 at baseline  - Trend cbc.    Gout.   - Cont. allopurinol.    Hypothyroid.   - Cont. levothyroxine. Increase to 125 mcg daily.    Dizziness  - MR head with no acute findings.    DVT prophylaxis.   - DVT PPx  - Pt on eliquis.    DCP home.     Jason Murphy MD phone [phone]

## 2022-05-30 NOTE — PROGRESS NOTE ADULT - ASSESSMENT
ASSESSMENT:  66F w/ pmhx of CODP on 3l nc sometimes at home, htn, Afib, DVT on eliquis, CKD, gout, HLD presented with  worsening sob now s/p [facility] with normal coronaries on 5/27/2022     CKD vs VY - baseline Cr ?   Mild clinical hypervolemia  Blood pressure controlled  Gap acidosis due to CKD/VY   Anemia of CKD   s/p [facility] with normal coronaries on 5/27/2022     RECOMMEND:  Continue Bumex 1 mg po daily   Increase sodium bicarbonate 650 mg to tid  Give Aranesp 80 mcg subcutaneous one dose today  (not given yesterday)  Check urine protein   Calcitriol .25 mcg po MWF   Dose meds for CR CL 20 ml/min '    Lucrecia San NP-C  [facility]  [phone]

## 2022-05-30 NOTE — PROGRESS NOTE ADULT - SUBJECTIVE AND OBJECTIVE BOX
Patient is a 66y old  Female who presents with a chief complaint of Abnormal stress test (30 May 2022 09:03)      SUBJECTIVE / OVERNIGHT EVENTS: feels better. Less dizziness.   Review of Systems  chest pain no  palpitations no  sob no  nausea no  headache no    MEDICATIONS  (STANDING):  ALBUTerol    90 MICROgram(s) HFA Inhaler 1 Puff(s) Inhalation two times a day  allopurinol 300 milliGRAM(s) Oral daily  apixaban 5 milliGRAM(s) Oral two times a day  buMETAnide 1 milliGRAM(s) Oral daily  calcitriol   Capsule 0.25 MICROGram(s) Oral daily  darbepoetin Injectable ViaL 80 MICROGram(s) SubCutaneous once  fluticasone propionate 50 MICROgram(s)/spray Nasal Spray 1 Spray(s) Both Nostrils two times a day  folic acid 1 milliGRAM(s) Oral daily  levothyroxine 125 MICROGram(s) Oral daily  pantoprazole    Tablet 40 milliGRAM(s) Oral before breakfast  sodium bicarbonate 650 milliGRAM(s) Oral three times a day    MEDICATIONS  (PRN):  acetaminophen     Tablet .. 650 milliGRAM(s) Oral every 6 hours PRN Temp greater or equal to 38C (100.4F), Mild Pain (1 - 3)  melatonin 3 milliGRAM(s) Oral at bedtime PRN Insomnia  ondansetron Injectable 4 milliGRAM(s) IV Push every 8 hours PRN Nausea and/or Vomiting      Vital Signs Last 24 Hrs  T(C): 37 (30 May 2022 11:24), Max: 37 (30 May 2022 11:24)  T(F): 98.6 (30 May 2022 11:24), Max: 98.6 (30 May 2022 11:24)  HR: 64 (30 May 2022 11:24) (62 - 70)  BP: 130/81 (30 May 2022 11:24) (113/64 - 130/81)  BP(mean): --  RR: 18 (30 May 2022 11:24) (18 - 18)  SpO2: 100% (30 May 2022 11:24) (100% - 100%)    PHYSICAL EXAM:  GENERAL: NAD, well-developed  HEAD:  Atraumatic, Normocephalic  EYES: EOMI, PERRLA, conjunctiva and sclera clear  NECK: Supple, No JVD  CHEST/LUNG: Clear to auscultation bilaterally; No wheeze  HEART: Regular rate and rhythm; No murmurs, rubs, or gallops  ABDOMEN: Soft, Nontender, Nondistended; Bowel sounds present  EXTREMITIES:  2+ Peripheral Pulses, No clubbing, cyanosis, or edema  PSYCH: AAOx3  NEUROLOGY: non-focal  SKIN: No rashes or lesions    LABS:                        8.2    5.40  )-----------( 178      ( 30 May 2022 06:25 )             27.6     05-30    141  |  112<H>  |  18  ----------------------------<  91  4.1   |  16<L>  |  2.17<H>    Ca    7.3<L>      30 May 2022 06:25                  RADIOLOGY & ADDITIONAL TESTS:    Imaging Personally Reviewed:    Consultant(s) Notes Reviewed:      Care Discussed with Consultants/Other Providers:

## 2022-05-30 NOTE — PROGRESS NOTE ADULT - SUBJECTIVE AND OBJECTIVE BOX
NEPHROLOGY     Patient seen and examined.    MEDICATIONS  (STANDING):  ALBUTerol    90 MICROgram(s) HFA Inhaler 1 Puff(s) Inhalation two times a day  allopurinol 300 milliGRAM(s) Oral daily  apixaban 5 milliGRAM(s) Oral two times a day  buMETAnide 1 milliGRAM(s) Oral daily  calcitriol   Capsule 0.25 MICROGram(s) Oral daily  darbepoetin Injectable ViaL 80 MICROGram(s) SubCutaneous once  fluticasone propionate 50 MICROgram(s)/spray Nasal Spray 1 Spray(s) Both Nostrils two times a day  folic acid 1 milliGRAM(s) Oral daily  levothyroxine 125 MICROGram(s) Oral daily  pantoprazole    Tablet 40 milliGRAM(s) Oral before breakfast  sodium bicarbonate 650 milliGRAM(s) Oral two times a day    VITALS:  T(C): , Max: 36.8 (05-30-22 @ 04:28)  T(F): , Max: 98.2 (05-30-22 @ 04:28)  HR: 62 (05-30-22 @ 04:28)  BP: 113/64 (05-30-22 @ 04:28)  RR: 18 (05-30-22 @ 04:28)  SpO2: 100% (05-30-22 @ 04:28)    I and O's:    05-29 @ 07:01  -  05-30 @ 07:00  --------------------------------------------------------  IN: 1440 mL / OUT: 150 mL / NET: 1290 mL    PHYSICAL EXAM:  Constitutional: NAD, Alert  HEENT: NCAT, MMM  Neck: Supple, No JVD  Respiratory: CTA-b/l  Cardiovascular: RRR s1s2, no m/r/g  Gastrointestinal: BS+, soft, NT/ND  Extremities: No peripheral edema b/l  Urology- + Ext catheter     LABS:                        8.2    5.40  )-----------( 178      ( 30 May 2022 06:25 )             27.6     05-30    141  |  112<H>  |  18  ----------------------------<  91  4.1   |  16<L>  |  2.17<H>    Ca    7.3<L>      30 May 2022 06:25    RADIOLOGY & ADDITIONAL STUDIES:      ACC: [id number] EXAM:  MR BRAIN                          PROCEDURE DATE:  05/29/2022          INTERPRETATION:  CLINICAL STATEMENT: Dizziness    TECHNIQUE: MRI of the brain was performed without gadolinium.    COMPARISON: None.    FINDINGS:  . There are T2 prolongation signal abnormalities in the periventricular   subcortical white matter likely related to mild chronic microvascular   ischemic changes.    There is no acute parenchymal hemorrhage, parenchymal mass, mass effect   or midline shift. There is no extra-axial fluid collection.  There is no   hydrocephalus.  There is no acute infarct. Partial empty sella.    Mucosal thickening paranasal sinuses.    Nonspecific low T1 bone marrow signal which may be related to red marrow   hyperplasia in the correct clinical setting.    IMPRESSION:  No acute intracranial hemorrhage or acute infarct.    --- End of Report ---            MELLISA LEI MD; Attending Radiologist  This document has been electronically signed. May 29 2022  4:06PM   NEPHROLOGY     Patient seen and examined, she reports feeling better, no lightheadedness this morning, denies pain, no sob, comfortable on room air, currently in no acute distress.     MEDICATIONS  (STANDING):  ALBUTerol    90 MICROgram(s) HFA Inhaler 1 Puff(s) Inhalation two times a day  allopurinol 300 milliGRAM(s) Oral daily  apixaban 5 milliGRAM(s) Oral two times a day  buMETAnide 1 milliGRAM(s) Oral daily  calcitriol   Capsule 0.25 MICROGram(s) Oral daily  darbepoetin Injectable ViaL 80 MICROGram(s) SubCutaneous once  fluticasone propionate 50 MICROgram(s)/spray Nasal Spray 1 Spray(s) Both Nostrils two times a day  folic acid 1 milliGRAM(s) Oral daily  levothyroxine 125 MICROGram(s) Oral daily  pantoprazole    Tablet 40 milliGRAM(s) Oral before breakfast  sodium bicarbonate 650 milliGRAM(s) Oral two times a day    VITALS:  T(C): , Max: 36.8 (05-30-22 @ 04:28)  T(F): , Max: 98.2 (05-30-22 @ 04:28)  HR: 62 (05-30-22 @ 04:28)  BP: 113/64 (05-30-22 @ 04:28)  RR: 18 (05-30-22 @ 04:28)  SpO2: 100% (05-30-22 @ 04:28)    I and O's:    05-29 @ 07:01  -  05-30 @ 07:00  --------------------------------------------------------  IN: 1440 mL / OUT: 150 mL / NET: 1290 mL    PHYSICAL EXAM:  Constitutional: NAD, Alert  HEENT: NCAT, MMM  Neck: Supple, No JVD  Respiratory: CTA-b/l  Cardiovascular: RRR s1s2, no m/r/g  Gastrointestinal: BS+, soft, NT/ND  Extremities: No peripheral edema b/l  Urology- + Ext catheter     LABS:                        8.2    5.40  )-----------( 178      ( 30 May 2022 06:25 )             27.6     05-30    141  |  112<H>  |  18  ----------------------------<  91  4.1   |  16<L>  |  2.17<H>    Ca    7.3<L>      30 May 2022 06:25    RADIOLOGY & ADDITIONAL STUDIES:      ACC: [id number] EXAM:  MR BRAIN                          PROCEDURE DATE:  05/29/2022          INTERPRETATION:  CLINICAL STATEMENT: Dizziness    TECHNIQUE: MRI of the brain was performed without gadolinium.    COMPARISON: None.    FINDINGS:  . There are T2 prolongation signal abnormalities in the periventricular   subcortical white matter likely related to mild chronic microvascular   ischemic changes.    There is no acute parenchymal hemorrhage, parenchymal mass, mass effect   or midline shift. There is no extra-axial fluid collection.  There is no   hydrocephalus.  There is no acute infarct. Partial empty sella.    Mucosal thickening paranasal sinuses.    Nonspecific low T1 bone marrow signal which may be related to red marrow   hyperplasia in the correct clinical setting.    IMPRESSION:  No acute intracranial hemorrhage or acute infarct.    --- End of Report ---            MELLISA LEI MD; Attending Radiologist  This document has been electronically signed. May 29 2022  4:06PM

## 2022-05-31 ENCOUNTER — TRANSCRIPTION ENCOUNTER (OUTPATIENT)
Age: 67
End: 2022-05-31

## 2022-05-31 VITALS
RESPIRATION RATE: 18 BRPM | TEMPERATURE: 99 F | SYSTOLIC BLOOD PRESSURE: 114 MMHG | DIASTOLIC BLOOD PRESSURE: 68 MMHG | OXYGEN SATURATION: 98 % | HEART RATE: 79 BPM

## 2022-05-31 LAB
ANION GAP SERPL CALC-SCNC: 13 MMOL/L — SIGNIFICANT CHANGE UP (ref 5–17)
BUN SERPL-MCNC: 18 MG/DL — SIGNIFICANT CHANGE UP (ref 7–23)
CALCIUM SERPL-MCNC: 7.7 MG/DL — LOW (ref 8.4–10.5)
CHLORIDE SERPL-SCNC: 107 MMOL/L — SIGNIFICANT CHANGE UP (ref 96–108)
CO2 SERPL-SCNC: 17 MMOL/L — LOW (ref 22–31)
CREAT SERPL-MCNC: 2.16 MG/DL — HIGH (ref 0.5–1.3)
EGFR: 25 ML/MIN/1.73M2 — LOW
GLUCOSE SERPL-MCNC: 89 MG/DL — SIGNIFICANT CHANGE UP (ref 70–99)
HCT VFR BLD CALC: 31.2 % — LOW (ref 34.5–45)
HGB BLD-MCNC: 9.1 G/DL — LOW (ref 11.5–15.5)
MCHC RBC-ENTMCNC: 29.2 GM/DL — LOW (ref 32–36)
MCHC RBC-ENTMCNC: 33.7 PG — SIGNIFICANT CHANGE UP (ref 27–34)
MCV RBC AUTO: 115.6 FL — HIGH (ref 80–100)
NRBC # BLD: 0 /100 WBCS — SIGNIFICANT CHANGE UP (ref 0–0)
PLATELET # BLD AUTO: 176 K/UL — SIGNIFICANT CHANGE UP (ref 150–400)
POTASSIUM SERPL-MCNC: 4.2 MMOL/L — SIGNIFICANT CHANGE UP (ref 3.5–5.3)
POTASSIUM SERPL-SCNC: 4.2 MMOL/L — SIGNIFICANT CHANGE UP (ref 3.5–5.3)
RBC # BLD: 2.7 M/UL — LOW (ref 3.8–5.2)
RBC # FLD: 15 % — HIGH (ref 10.3–14.5)
SODIUM SERPL-SCNC: 137 MMOL/L — SIGNIFICANT CHANGE UP (ref 135–145)
WBC # BLD: 6.09 K/UL — SIGNIFICANT CHANGE UP (ref 3.8–10.5)
WBC # FLD AUTO: 6.09 K/UL — SIGNIFICANT CHANGE UP (ref 3.8–10.5)

## 2022-05-31 PROCEDURE — 93458 L HRT ARTERY/VENTRICLE ANGIO: CPT

## 2022-05-31 PROCEDURE — C1894: CPT

## 2022-05-31 PROCEDURE — 97161 PT EVAL LOW COMPLEX 20 MIN: CPT

## 2022-05-31 PROCEDURE — 82570 ASSAY OF URINE CREATININE: CPT

## 2022-05-31 PROCEDURE — 84156 ASSAY OF PROTEIN URINE: CPT

## 2022-05-31 PROCEDURE — 85025 COMPLETE CBC W/AUTO DIFF WBC: CPT

## 2022-05-31 PROCEDURE — 83880 ASSAY OF NATRIURETIC PEPTIDE: CPT

## 2022-05-31 PROCEDURE — U0003: CPT

## 2022-05-31 PROCEDURE — 80053 COMPREHEN METABOLIC PANEL: CPT

## 2022-05-31 PROCEDURE — 81001 URINALYSIS AUTO W/SCOPE: CPT

## 2022-05-31 PROCEDURE — 36415 COLL VENOUS BLD VENIPUNCTURE: CPT

## 2022-05-31 PROCEDURE — 82962 GLUCOSE BLOOD TEST: CPT

## 2022-05-31 PROCEDURE — 71250 CT THORAX DX C-: CPT | Mod: MA

## 2022-05-31 PROCEDURE — 71045 X-RAY EXAM CHEST 1 VIEW: CPT

## 2022-05-31 PROCEDURE — 86803 HEPATITIS C AB TEST: CPT

## 2022-05-31 PROCEDURE — 99285 EMERGENCY DEPT VISIT HI MDM: CPT | Mod: 25

## 2022-05-31 PROCEDURE — 85027 COMPLETE CBC AUTOMATED: CPT

## 2022-05-31 PROCEDURE — 70551 MRI BRAIN STEM W/O DYE: CPT

## 2022-05-31 PROCEDURE — C8929: CPT

## 2022-05-31 PROCEDURE — 80048 BASIC METABOLIC PNL TOTAL CA: CPT

## 2022-05-31 PROCEDURE — 93005 ELECTROCARDIOGRAM TRACING: CPT

## 2022-05-31 PROCEDURE — 94640 AIRWAY INHALATION TREATMENT: CPT

## 2022-05-31 PROCEDURE — 84443 ASSAY THYROID STIM HORMONE: CPT

## 2022-05-31 PROCEDURE — C1769: CPT

## 2022-05-31 PROCEDURE — 93306 TTE W/DOPPLER COMPLETE: CPT | Mod: 26

## 2022-05-31 PROCEDURE — 84484 ASSAY OF TROPONIN QUANT: CPT

## 2022-05-31 PROCEDURE — 83735 ASSAY OF MAGNESIUM: CPT

## 2022-05-31 PROCEDURE — C1887: CPT

## 2022-05-31 PROCEDURE — U0005: CPT

## 2022-05-31 PROCEDURE — 99152 MOD SED SAME PHYS/QHP 5/>YRS: CPT

## 2022-05-31 PROCEDURE — 96374 THER/PROPH/DIAG INJ IV PUSH: CPT

## 2022-05-31 RX ORDER — LEVOTHYROXINE SODIUM 125 MCG
1 TABLET ORAL
Qty: 30 | Refills: 0
Start: 2022-05-31 | End: 2022-06-29

## 2022-05-31 RX ORDER — SODIUM BICARBONATE 1 MEQ/ML
1 SYRINGE (ML) INTRAVENOUS
Qty: 90 | Refills: 0
Start: 2022-05-31 | End: 2022-06-29

## 2022-05-31 RX ORDER — LEVOTHYROXINE SODIUM 125 MCG
1 TABLET ORAL
Qty: 0 | Refills: 0 | DISCHARGE

## 2022-05-31 RX ORDER — CALCITRIOL 0.5 UG/1
1 CAPSULE ORAL
Qty: 30 | Refills: 0
Start: 2022-05-31 | End: 2022-06-29

## 2022-05-31 RX ORDER — NIFEDIPINE 30 MG
1 TABLET, EXTENDED RELEASE 24 HR ORAL
Qty: 0 | Refills: 0 | DISCHARGE

## 2022-05-31 RX ORDER — BUMETANIDE 0.25 MG/ML
1 INJECTION INTRAMUSCULAR; INTRAVENOUS
Qty: 30 | Refills: 0
Start: 2022-05-31 | End: 2022-06-29

## 2022-05-31 RX ORDER — SIMETHICONE 80 MG/1
80 TABLET, CHEWABLE ORAL EVERY 8 HOURS
Refills: 0 | Status: DISCONTINUED | OUTPATIENT
Start: 2022-05-31 | End: 2022-05-31

## 2022-05-31 RX ADMIN — APIXABAN 5 MILLIGRAM(S): 2.5 TABLET, FILM COATED ORAL at 06:11

## 2022-05-31 RX ADMIN — Medication 1 MILLIGRAM(S): at 11:20

## 2022-05-31 RX ADMIN — Medication 650 MILLIGRAM(S): at 13:52

## 2022-05-31 RX ADMIN — Medication 300 MILLIGRAM(S): at 11:20

## 2022-05-31 RX ADMIN — APIXABAN 5 MILLIGRAM(S): 2.5 TABLET, FILM COATED ORAL at 19:11

## 2022-05-31 RX ADMIN — PANTOPRAZOLE SODIUM 40 MILLIGRAM(S): 20 TABLET, DELAYED RELEASE ORAL at 06:11

## 2022-05-31 RX ADMIN — BUMETANIDE 1 MILLIGRAM(S): 0.25 INJECTION INTRAMUSCULAR; INTRAVENOUS at 06:12

## 2022-05-31 RX ADMIN — CALCITRIOL 0.25 MICROGRAM(S): 0.5 CAPSULE ORAL at 11:20

## 2022-05-31 RX ADMIN — Medication 1 SPRAY(S): at 18:30

## 2022-05-31 RX ADMIN — SIMETHICONE 80 MILLIGRAM(S): 80 TABLET, CHEWABLE ORAL at 08:41

## 2022-05-31 RX ADMIN — Medication 650 MILLIGRAM(S): at 06:11

## 2022-05-31 RX ADMIN — Medication 125 MICROGRAM(S): at 06:11

## 2022-05-31 RX ADMIN — Medication 1 SPRAY(S): at 06:12

## 2022-05-31 NOTE — PHYSICAL THERAPY INITIAL EVALUATION ADULT - ADDITIONAL COMMENTS
Patient lives in elevator apartment building, +ramp to enter. Patient household ambulator with cane or rollator, community mobility with wheelchair. Patient owns rollator, cane, wheelchair, shower chair, commode and supplemental O2

## 2022-05-31 NOTE — PHYSICAL THERAPY INITIAL EVALUATION ADULT - BED MOBILITY TRAINING, PT EVAL
[facility] - INPATIENT  Face to Face Encounter    Patients Name: Franco Pérez    YOB: 1961    Ordering Physician:  Regan Oliva    Rigo Henderson Pelon, [address]  GOAL: Patient will perform bed mobility independently in 2 weeks

## 2022-05-31 NOTE — DISCHARGE NOTE PROVIDER - NSDCCPCAREPLAN_GEN_ALL_CORE_FT
PRINCIPAL DISCHARGE DIAGNOSIS  Diagnosis: Abnormal stress test  Assessment and Plan of Treatment: Acute Cardiac concern(s) ruled out      SECONDARY DISCHARGE DIAGNOSES  Diagnosis: SOB (shortness of breath)  Assessment and Plan of Treatment: improving    Diagnosis: Hypothyroid  Assessment and Plan of Treatment: Synthroid dosing adjusted  Follow up with your Endocrinologist / Primary Care Physician in 4 - 6 weeks    Diagnosis: COPD, severe  Assessment and Plan of Treatment: Call your Health Care provider upon arrival home to make a follow up appointment within one week.  Take all inhalers as prescribed by your Health Care Provider.  Take steroids as prescribed by your Health Care Provider.  If your cough increases infrequency and severity and/or you have shortness of breath or increased shortness of breath call your Health Care Provider.  If you develop fever, chills, night sweats, malaise, and/or change in mental status call your Health care Provider.  Nutrition is very important.  Eat small frequent meals.  Increase your activity as tolerated.  Do not stay in bed all day

## 2022-05-31 NOTE — PROGRESS NOTE ADULT - REASON FOR ADMISSION
Abnormal stress test

## 2022-05-31 NOTE — PROGRESS NOTE ADULT - PROVIDER SPECIALTY LIST ADULT
Patient is scheduled now for tomorrow.  Triaged by nurse. No fever . Sx not better.  Cris Rivera RN    
Cardiology
Cardiology
Nephrology
Internal Medicine
Nephrology
Internal Medicine
Nephrology

## 2022-05-31 NOTE — DISCHARGE NOTE PROVIDER - NSDCMRMEDTOKEN_GEN_ALL_CORE_FT
allopurinol 300 mg oral tablet: 1 tab(s) orally once a day  atorvastatin 80 mg oral tablet: 1 tab(s) orally once a day  bumetanide 1 mg oral tablet: 1 tab(s) orally once a day  calcitriol 0.25 mcg oral capsule: 1 cap(s) orally once a day  colchicine 0.6 mg oral tablet: 1 tab(s) orally once a day, As Needed  Eliquis 5 mg oral tablet: 1 tab(s) orally 2 times a day  fluticasone 50 mcg/inh nasal spray: 1 spray(s) nasal once a day  folic acid 1 mg oral tablet: 1 tab(s) orally once a day  levothyroxine 125 mcg (0.125 mg) oral tablet: 1 tab(s) orally once a day  NIFEdipine 30 mg oral tablet, extended release: 1 tab(s) orally once a day  omeprazole 40 mg oral delayed release capsule: 1 cap(s) orally once a day  sodium bicarbonate 650 mg oral tablet: 1 tab(s) orally 3 times a day  Trelegy Ellipta 100 mcg-62.5 mcg-25 mcg/inh inhalation powder: 1 puff(s) inhaled once a day  Vitamin B12 1000 mcg/mL injectable solution: 1  injectable once a month   allopurinol 300 mg oral tablet: 1 tab(s) orally once a day  atorvastatin 80 mg oral tablet: 1 tab(s) orally once a day  bumetanide 1 mg oral tablet: 1 tab(s) orally once a day  calcitriol 0.25 mcg oral capsule: 1 cap(s) orally once a day  colchicine 0.6 mg oral tablet: 1 tab(s) orally once a day, As Needed  Eliquis 5 mg oral tablet: 1 tab(s) orally 2 times a day  fluticasone 50 mcg/inh nasal spray: 1 spray(s) nasal once a day  folic acid 1 mg oral tablet: 1 tab(s) orally once a day  levothyroxine 125 mcg (0.125 mg) oral tablet: 1 tab(s) orally once a day  omeprazole 40 mg oral delayed release capsule: 1 cap(s) orally once a day  sodium bicarbonate 650 mg oral tablet: 1 tab(s) orally 3 times a day  Trelegy Ellipta 100 mcg-62.5 mcg-25 mcg/inh inhalation powder: 1 puff(s) inhaled once a day  Vitamin B12 1000 mcg/mL injectable solution: 1  injectable once a month

## 2022-05-31 NOTE — DISCHARGE NOTE PROVIDER - HOSPITAL COURSE
66F w/ pmhx of COPD on 3L nasal cannula PRN at home, HTN, Afib, DVT on eliquis, CKD, gout and HLD.  Presented with complaints of worsening sob and positive stress test  SOB (shortness of breath).   - Recent positive stress test. 5/23 TTE EF 5055%, mild hypertrophy, moderate aortic regurgitation.   - s/p LHC with no obstruction.  - s/p in-patient TTE on 5/31/22 - no acute findings identified  COPD, severe.   - On 3LNC at home which she states she does not always use. Has CPAP  - On Trelegy at home  - Cont. supplemental 02 as needed  - Pt with wheelchair at bedside.  Paroxysmal atrial fibrillation.   - eliquis.  Essential hypertension.   - BP within acceptable parameters  Stage 3 chronic kidney disease.   - Crt 2.60 close to baseline  - Renal dose medications.  - Nephrology following in-patient  Anemia.   - Hgb 8-10 at baseline  Gout.   - Cont. allopurinol.  Hypothyroid.   - Cont. levothyroxine. Increase to 125 mcg daily.  Dizziness  - MR head with no acute findings.

## 2022-05-31 NOTE — PROGRESS NOTE ADULT - SUBJECTIVE AND OBJECTIVE BOX
NEPHROLOGY-NSN [phone]        Patient seen and examined on 3L nasal cannula, worked with PT no new complaints.         MEDICATIONS  (STANDING):  ALBUTerol    90 MICROgram(s) HFA Inhaler 1 Puff(s) Inhalation two times a day  allopurinol 300 milliGRAM(s) Oral daily  apixaban 5 milliGRAM(s) Oral two times a day  buMETAnide 1 milliGRAM(s) Oral daily  calcitriol   Capsule 0.25 MICROGram(s) Oral daily  fluticasone propionate 50 MICROgram(s)/spray Nasal Spray 1 Spray(s) Both Nostrils two times a day  folic acid 1 milliGRAM(s) Oral daily  levothyroxine 125 MICROGram(s) Oral daily  pantoprazole    Tablet 40 milliGRAM(s) Oral before breakfast  sodium bicarbonate 650 milliGRAM(s) Oral three times a day      VITAL:  T(C): , Max: 36.7 (22 @ 04:25)  T(F): , Max: 98.1 (22 @ 04:25)  HR: 76 (22 @ 13:39)  BP: 112/68 (22 @ 13:39)  BP(mean): --  RR: 18 (22 @ 11:14)  SpO2: 100% (22 @ 13:39)  Wt(kg): --    I and O's:     @ 07:01  -   @ 07:00  --------------------------------------------------------  IN: 600 mL / OUT: 0 mL / NET: 600 mL     @ 07:01  -   @ 13:54  --------------------------------------------------------  IN: 540 mL / OUT: 0 mL / NET: 540 mL          PHYSICAL EXAM:    Constitutional: NAD  Neck:  No JVD  Respiratory: CTAB/L  Cardiovascular: S1 and S2  Gastrointestinal: BS+, soft, NT/ND  Extremities: No peripheral edema  Neurological: A/O x 3, no focal deficits  Psychiatric: Normal mood, normal affect  : +external catheter     LABS:                        9.1    6.09  )-----------( 176      ( 31 May 2022 07:29 )             31.2         137  |  107  |  18  ----------------------------<  89  4.2   |  17<L>  |  2.16<H>    Ca    7.7<L>      31 May 2022 07:29            Urine Studies:  Urinalysis Basic - ( 30 May 2022 16:44 )    Color: Yellow / Appearance: Slightly Turbid / S.014 / pH: x  Gluc: x / Ketone: Negative  / Bili: Negative / Urobili: Negative   Blood: x / Protein: 30 mg/dL / Nitrite: Negative   Leuk Esterase: Moderate / RBC: 0 /hpf / WBC 17 /HPF   Sq Epi: x / Non Sq Epi: 12 /hpf / Bacteria: Many      Creatinine, Random Urine: 136 mg/dL ( @ 16:45)  Protein/Creatinine Ratio Calculation: 0.2 Ratio ( @ 16:45)

## 2022-05-31 NOTE — DISCHARGE NOTE NURSING/CASE MANAGEMENT/SOCIAL WORK - PATIENT PORTAL LINK FT
You can access the FollowMyHealth Patient Portal offered by [facility] by registering at the following website: [email or web address]. By joining [employer]’s FollowMyHealth portal, you will also be able to view your health information using other applications (apps) compatible with our system.

## 2022-05-31 NOTE — DISCHARGE NOTE PROVIDER - CARE PROVIDERS DIRECT ADDRESSES
,DirectAddress_Unknown,[email or web address]

## 2022-05-31 NOTE — DISCHARGE NOTE PROVIDER - PROVIDER TOKENS
PROVIDER:[TOKEN:[[id number]]],PROVIDER:[TOKEN:[[id number]]],PROVIDER:[TOKEN:[[id number]]]

## 2022-05-31 NOTE — PROGRESS NOTE ADULT - SUBJECTIVE AND OBJECTIVE BOX
Patient is a 66y old  Female who presents with a chief complaint of Abnormal stress test (31 May 2022 13:53)      SUBJECTIVE / OVERNIGHT EVENTS: Comfortable without new complaints.   Review of Systems  chest pain no  palpitations no  sob no  nausea no  headache no    MEDICATIONS  (STANDING):  ALBUTerol    90 MICROgram(s) HFA Inhaler 1 Puff(s) Inhalation two times a day  allopurinol 300 milliGRAM(s) Oral daily  apixaban 5 milliGRAM(s) Oral two times a day  buMETAnide 1 milliGRAM(s) Oral daily  calcitriol   Capsule 0.25 MICROGram(s) Oral daily  fluticasone propionate 50 MICROgram(s)/spray Nasal Spray 1 Spray(s) Both Nostrils two times a day  folic acid 1 milliGRAM(s) Oral daily  levothyroxine 125 MICROGram(s) Oral daily  pantoprazole    Tablet 40 milliGRAM(s) Oral before breakfast  sodium bicarbonate 650 milliGRAM(s) Oral three times a day    MEDICATIONS  (PRN):  acetaminophen     Tablet .. 650 milliGRAM(s) Oral every 6 hours PRN Temp greater or equal to 38C (100.4F), Mild Pain (1 - 3)  melatonin 3 milliGRAM(s) Oral at bedtime PRN Insomnia  ondansetron Injectable 4 milliGRAM(s) IV Push every 8 hours PRN Nausea and/or Vomiting  simethicone 80 milliGRAM(s) Chew every 8 hours PRN Gas      Vital Signs Last 24 Hrs  T(C): 36.5 (31 May 2022 11:14), Max: 36.7 (31 May 2022 04:25)  T(F): 97.7 (31 May 2022 11:14), Max: 98.1 (31 May 2022 04:25)  HR: 76 (31 May 2022 13:39) (64 - 76)  BP: 112/68 (31 May 2022 13:39) (108/67 - 119/64)  BP(mean): --  RR: 18 (31 May 2022 11:14) (18 - 18)  SpO2: 100% (31 May 2022 13:39) (100% - 100%)    PHYSICAL EXAM:  GENERAL: NAD, well-developed  HEAD:  Atraumatic, Normocephalic  EYES: EOMI, PERRLA, conjunctiva and sclera clear  NECK: Supple, No JVD  CHEST/LUNG: Clear to auscultation bilaterally; No wheeze  HEART: Regular rate and rhythm; No murmurs, rubs, or gallops  ABDOMEN: Soft, Nontender, Nondistended; Bowel sounds present  EXTREMITIES:  2+ Peripheral Pulses, No clubbing, cyanosis, or edema  PSYCH: AAOx3  NEUROLOGY: non-focal  SKIN: No rashes or lesions    LABS:                        9.1    6.09  )-----------( 176      ( 31 May 2022 07:29 )             31.2         137  |  107  |  18  ----------------------------<  89  4.2   |  17<L>  |  2.16<H>    Ca    7.7<L>      31 May 2022 07:29            Urinalysis Basic - ( 30 May 2022 16:44 )    Color: Yellow / Appearance: Slightly Turbid / S.014 / pH: x  Gluc: x / Ketone: Negative  / Bili: Negative / Urobili: Negative   Blood: x / Protein: 30 mg/dL / Nitrite: Negative   Leuk Esterase: Moderate / RBC: 0 /hpf / WBC 17 /HPF   Sq Epi: x / Non Sq Epi: 12 /hpf / Bacteria: Many      < from: TTE with Doppler (w/Cont) (22 @ 08:57) >  Conclusions:  Technically difficult study.  1. Normal mitral valve. Minimal mitral regurgitation.  2. Aortic valve not well visualized; appears to be a  calcified trileaflet valve with normal opening. Grossly  moderate aortic regurgitation.  3. Endocardial visualization enhanced with intravenous  injection of Ultrasonic Enhancing Agent (Definity). Left  ventricle suboptimally visualized despite intravenous  ultrasonic enhancing agent; grossly normal left ventricular  systolic function. Unable to completely exclude segmental  wall motion abnormalities based on technically difficult  images.  4. The right ventricle is not well visualized; grossly  normal right ventricular systolic function.    < end of copied text >      RADIOLOGY & ADDITIONAL TESTS:    Imaging Personally Reviewed:    Consultant(s) Notes Reviewed:      Care Discussed with Consultants/Other Providers:

## 2022-05-31 NOTE — DISCHARGE NOTE PROVIDER - NSDCFUADDINST_GEN_ALL_CORE_FT
Make appointment(s) to follow up with your Health Care Provider(s) - bring all discharge paperwork including discharge medication list to follow up appointment.  Take only the medications listed on your discharge medication list - Your Synthroid dose was increased - all new/ changed prescriptions were sent to the [facility] pharmacy in [place] as you requested.

## 2022-05-31 NOTE — PROGRESS NOTE ADULT - SUBJECTIVE AND OBJECTIVE BOX
Cardiovascular Disease Progress Note    Overnight events: No acute events overnight.  no cp/sob/palps/dizziness  Otherwise review of systems negative    Objective Findings:  T(C): 36.7 (05-31-22 @ 04:25), Max: 37 (05-30-22 @ 11:24)  HR: 70 (05-31-22 @ 04:25) (64 - 70)  BP: 119/64 (05-31-22 @ 04:25) (117/69 - 130/81)  RR: 18 (05-31-22 @ 04:25) (18 - 18)  SpO2: 100% (05-31-22 @ 04:25) (100% - 100%)  Wt(kg): --  Daily     Daily       Physical Exam:  Gen: NAD  HEENT: EOMI  CV: RRR, normal S1 + S2, no m/r/g  Lungs: CTAB  Abd: soft, non-tender  Ext: No edema    Telemetry:    Laboratory Data:                        8.2    5.40  )-----------( 178      ( 30 May 2022 06:25 )             27.6     05-30    141  |  112<H>  |  18  ----------------------------<  91  4.1   |  16<L>  |  2.17<H>    Ca    7.3<L>      30 May 2022 06:25                Inpatient Medications:  MEDICATIONS  (STANDING):  ALBUTerol    90 MICROgram(s) HFA Inhaler 1 Puff(s) Inhalation two times a day  allopurinol 300 milliGRAM(s) Oral daily  apixaban 5 milliGRAM(s) Oral two times a day  buMETAnide 1 milliGRAM(s) Oral daily  calcitriol   Capsule 0.25 MICROGram(s) Oral daily  fluticasone propionate 50 MICROgram(s)/spray Nasal Spray 1 Spray(s) Both Nostrils two times a day  folic acid 1 milliGRAM(s) Oral daily  levothyroxine 125 MICROGram(s) Oral daily  pantoprazole    Tablet 40 milliGRAM(s) Oral before breakfast  sodium bicarbonate 650 milliGRAM(s) Oral three times a day      Assessment:  unstable angina  pos nst  acute on chronic diastolic HF  pAF  hx of PE/DVT  dizziness  CKD  COPD  HTN  HLD    Recs:  cardiac stable  no e/o acs by ecg or biomarkers. s/p lhc normal cors  vol status stable. cw bumex 1mg qd. check 2d echo  resume AC for hx of DVT/PE  tele monitoring given c/o near syncope  cw bronchodilators  renal follow up  dispo planning        Over 25 minutes spent on total encounter; more than 50% of the visit was spent counseling and/or coordinating care by the attending physician.      Prakash Rees MD   Cardiovascular Disease  [phone]

## 2022-05-31 NOTE — PHYSICAL THERAPY INITIAL EVALUATION ADULT - PERTINENT HX OF CURRENT PROBLEM, REHAB EVAL
66F w/ pmhx of COPD on 3L nc sometimes at home, HTN, Afib, DVT on eliquis, CKD, gout, HLD p/w worsening sob and positive stress test. *5/26/22: Chest CT: No lung or hilar mass. *5/27/22: LHC: Normal coronaries *5/29/22: MRI head: No acute intracranial hemorrhage or acute infarct

## 2022-05-31 NOTE — DISCHARGE NOTE PROVIDER - CARE PROVIDER_API CALL
Bianca Mccord (DNP; NP; RN)  NP in [facility] [place]  [address] [place] [address]  Phone: [phone]  Fax: [phone]  Follow Up Time:     Ramona Lopez (DO)  Internal Medicine; Pulmonary Disease; Sleep Medicine  [address] [facility] [address], [address]  [place] [address]  Phone: [phone]  Fax: [phone]  Follow Up Time:     Jonas Henderson (MD; MBBS)  Cardiovascular Disease; Internal Medicine; Nuclear Cardiology  [address], [address]  [place] [address]  Phone: [phone]  Fax: [phone]  Follow Up Time:

## 2022-05-31 NOTE — PROGRESS NOTE ADULT - ASSESSMENT
ASSESSMENT:  66F w/ pmhx of CODP on 3l nc sometimes at home, htn, Afib, DVT on eliquis, CKD, gout, HLD presented with  worsening sob now s/p LHC with normal coronaries on 5/27/2022     CKD vs VY - baseline Cr ?   Mild clinical hypervolemia  Blood pressure controlled  Gap acidosis due to CKD/VY   Anemia of CKD s/p Aranesp (5/30)  s/p LHC with normal coronaries on 5/27/2022     RECOMMEND:  Continue Bumex 1 mg po daily   Continue sodium bicarbonate 650 mg to tid  Calcitriol 0.25 mcg po MWF   Dose meds for CR CL 20 ml/min '    Jacqueline Buchanan NP  [facility]  [phone]      ASSESSMENT:  66F w/ pmhx of CODP on 3l nc sometimes at home, htn, Afib, DVT on eliquis, CKD, gout, HLD presented with  worsening sob now s/p LHC with normal coronaries on 5/27/2022     CKD vs VY - baseline Cr ?   Mild clinical hypervolemia  Blood pressure controlled  Gap acidosis due to CKD/VY   Anemia of CKD s/p Aranesp (5/30)  s/p LHC with normal coronaries on 5/27/2022     RECOMMEND:  Continue Bumex 1 mg po daily   Continue sodium bicarbonate 650 mg tid  Calcitriol 0.25 mcg po MWF   Dose meds for CR CL 20 ml/min     Jacqueline Buchanan NP  [facility]  [phone]

## 2022-05-31 NOTE — PROGRESS NOTE ADULT - ASSESSMENT
66F w/ pmhx of COPD on 3L nc sometimes at home, HTN, Afib, DVT on eliquis, CKD, gout, HLD p/w worsening sob and positive stress test    SOB (shortness of breath).   - Recent positive stress test. 5/23 TTE EF 5055%, mild hypertrophy, moderate aortic regurgitation.   - s/p LHC with no obstruction.  - Telemetry  - F/u cardiology recommendations  - nephrology follow    COPD, severe.   - On 3LNC at home which she states she does not always use. Has CPAP  - On Trelegy at home  - Cont. supplemental 02 as needed  - Pt with wheelchair at bedside.    Paroxysmal atrial fibrillation.   - eliquis.    Essential hypertension.   - Trend BP    Stage 3 chronic kidney disease.   - Crt 2.60 close to baseline  - Trend BMP  - Renal dose medications.  - Nephrology evaluation    Anemia.   - Hgb 8-10 at baseline  - Trend cbc.    Gout.   - Cont. allopurinol.    Hypothyroid.   - Cont. levothyroxine. Increase to 125 mcg daily.    Dizziness  - MR head with no acute findings.    DVT prophylaxis.   - DVT PPx  - Pt on eliquis.    DC home. Follow with PMD/ cardiology in 3-4 days. QA     Jason Murphy MD phone [phone]

## 2022-06-01 ENCOUNTER — NON-APPOINTMENT (OUTPATIENT)
Age: 67
End: 2022-06-01

## 2022-06-02 PROBLEM — G47.33 OBSTRUCTIVE SLEEP APNEA (ADULT) (PEDIATRIC): Chronic | Status: ACTIVE | Noted: 2022-05-26

## 2022-06-02 PROBLEM — M10.9 GOUT, UNSPECIFIED: Chronic | Status: ACTIVE | Noted: 2022-05-26

## 2022-06-02 PROBLEM — I48.0 PAROXYSMAL ATRIAL FIBRILLATION: Chronic | Status: ACTIVE | Noted: 2022-05-26

## 2022-06-02 PROBLEM — D64.9 ANEMIA, UNSPECIFIED: Chronic | Status: ACTIVE | Noted: 2022-05-26

## 2022-06-02 PROBLEM — I10 ESSENTIAL (PRIMARY) HYPERTENSION: Chronic | Status: ACTIVE | Noted: 2022-05-26

## 2022-06-02 PROBLEM — N18.30 CHRONIC KIDNEY DISEASE, STAGE 3 UNSPECIFIED: Chronic | Status: ACTIVE | Noted: 2022-05-26

## 2022-06-02 PROBLEM — Z87.09 PERSONAL HISTORY OF OTHER DISEASES OF THE RESPIRATORY SYSTEM: Chronic | Status: ACTIVE | Noted: 2022-05-26

## 2022-06-23 ENCOUNTER — APPOINTMENT (OUTPATIENT)
Dept: INTERNAL MEDICINE | Facility: CLINIC | Age: 67
End: 2022-06-23
Payer: MEDICARE

## 2022-06-23 VITALS
OXYGEN SATURATION: 96 % | WEIGHT: 271 LBS | BODY MASS INDEX: 41.07 KG/M2 | TEMPERATURE: 97.4 F | HEIGHT: 68 IN | SYSTOLIC BLOOD PRESSURE: 120 MMHG | HEART RATE: 89 BPM | DIASTOLIC BLOOD PRESSURE: 81 MMHG

## 2022-06-23 DIAGNOSIS — E83.51 HYPOCALCEMIA: ICD-10-CM

## 2022-06-23 DIAGNOSIS — Z86.79 PERSONAL HISTORY OF OTHER DISEASES OF THE CIRCULATORY SYSTEM: ICD-10-CM

## 2022-06-23 DIAGNOSIS — Z71.89 OTHER SPECIFIED COUNSELING: ICD-10-CM

## 2022-06-23 PROCEDURE — 99213 OFFICE O/P EST LOW 20 MIN: CPT

## 2022-06-23 RX ORDER — NIFEDIPINE 30 MG/1
30 TABLET, EXTENDED RELEASE ORAL DAILY
Qty: 90 | Refills: 1 | Status: COMPLETED | COMMUNITY
Start: 2021-08-23 | End: 2022-06-23

## 2022-06-24 PROBLEM — E83.51 HYPOCALCEMIA: Status: ACTIVE | Noted: 2022-06-24

## 2022-06-24 PROBLEM — Z71.89 ENCOUNTER FOR PRE-BARIATRIC SURGERY COUNSELING AND EDUCATION: Noted: 2017-09-13

## 2022-06-24 PROBLEM — Z86.79 H/O CHF: Noted: 2019-06-21

## 2022-06-24 PROBLEM — Z71.89 ADVANCED CARE PLANNING/COUNSELING DISCUSSION: Status: ACTIVE | Noted: 2022-06-24

## 2022-06-24 NOTE — HISTORY OF PRESENT ILLNESS
[FreeTextEntry1] : 66 year old female presents for follow-up post-discharge. [de-identified] : 66 year old female presents for follow-up post-discharge.\par Patient hospitalized for heart failure exacerbation. Transported via ambulance to the hospital following abnormal stress test\par \par D/c nifedipine, started bumetanide, calcitrol, sodium bicarbonate\par Renewals\par \par Patient is able to walk from exam room to  using wheelchair as walker.\par Patient was unable to stand at last visit, and reports dizziness with sitting\par \par VNS twice weekly visits including PT/OT\par [facility] starting monthly visits. \par \par Stable labs 05/31/22

## 2022-06-24 NOTE — HEALTH RISK ASSESSMENT
[Never] : Never [Yes] : Yes [Monthly or less (1 pt)] : Monthly or less (1 point) [1 or 2 (0 pts)] : 1 or 2 (0 points) [Never (0 pts)] : Never (0 points) [No] : In the past 12 months have you used drugs other than those required for medical reasons? No [Two or more falls in past year] : Patient reported two or more falls in the past year [0] : 2) Feeling down, depressed, or hopeless: Not at all (0) [With Patient/Caregiver] : , with patient/caregiver [Designated Healthcare Proxy] : Designated healthcare proxy [Name: ___] : Health Care Proxy's Name: [unfilled]  [Relationship: ___] : Relationship: [unfilled] [Audit-CScore] : 1 [de-identified] : Physical Therapy twice a week [de-identified] : Poor diet [[id number]] : 0

## 2022-06-24 NOTE — ASSESSMENT
[FreeTextEntry1] : 66 year old female presents for follow-up\par See assessment for more details.\par Will repeat labwork at 6 weeks post-discharge.\par Will task Dr. Lopez regarding follow-up for patient.

## 2022-07-01 ENCOUNTER — NON-APPOINTMENT (OUTPATIENT)
Age: 67
End: 2022-07-01

## 2022-07-08 ENCOUNTER — NON-APPOINTMENT (OUTPATIENT)
Age: 67
End: 2022-07-08

## 2022-07-15 ENCOUNTER — LABORATORY RESULT (OUTPATIENT)
Age: 67
End: 2022-07-15

## 2022-07-19 LAB
ALBUMIN SERPL ELPH-MCNC: 4.1 G/DL
ALP BLD-CCNC: 245 U/L
ALT SERPL-CCNC: 24 U/L
ANION GAP SERPL CALC-SCNC: 19 MMOL/L
AST SERPL-CCNC: 56 U/L
BASOPHILS # BLD AUTO: 0.02 K/UL
BASOPHILS NFR BLD AUTO: 0.4 %
BILIRUB SERPL-MCNC: 0.3 MG/DL
BUN SERPL-MCNC: 22 MG/DL
CALCIUM SERPL-MCNC: 7.7 MG/DL
CHLORIDE SERPL-SCNC: 109 MMOL/L
CHOLEST SERPL-MCNC: 207 MG/DL
CO2 SERPL-SCNC: 11 MMOL/L
CREAT SERPL-MCNC: 2.18 MG/DL
EGFR: 25 ML/MIN/1.73M2
EOSINOPHIL # BLD AUTO: 0.08 K/UL
EOSINOPHIL NFR BLD AUTO: 1.6 %
ESTIMATED AVERAGE GLUCOSE: 108 MG/DL
GLUCOSE SERPL-MCNC: 91 MG/DL
HBA1C MFR BLD HPLC: 5.4 %
HCT VFR BLD CALC: 31.6 %
HDLC SERPL-MCNC: 99 MG/DL
HGB BLD-MCNC: 9.7 G/DL
IMM GRANULOCYTES NFR BLD AUTO: 0.4 %
LDLC SERPL CALC-MCNC: 56 MG/DL
LYMPHOCYTES # BLD AUTO: 2.31 K/UL
LYMPHOCYTES NFR BLD AUTO: 46.8 %
MAN DIFF?: NORMAL
MCHC RBC-ENTMCNC: 30.7 GM/DL
MCHC RBC-ENTMCNC: 32.2 PG
MCV RBC AUTO: 105 FL
MONOCYTES # BLD AUTO: 0.37 K/UL
MONOCYTES NFR BLD AUTO: 7.5 %
NEUTROPHILS # BLD AUTO: 2.14 K/UL
NEUTROPHILS NFR BLD AUTO: 43.3 %
NONHDLC SERPL-MCNC: 108 MG/DL
PLATELET # BLD AUTO: 216 K/UL
POTASSIUM SERPL-SCNC: 4.6 MMOL/L
PROT SERPL-MCNC: 7.2 G/DL
RBC # BLD: 3.01 M/UL
RBC # FLD: 15.9 %
SODIUM SERPL-SCNC: 139 MMOL/L
TRIGL SERPL-MCNC: 261 MG/DL
TSH SERPL-ACNC: 5.98 UIU/ML
URATE SERPL-MCNC: 10.6 MG/DL
VIT B12 SERPL-MCNC: 580 PG/ML
WBC # FLD AUTO: 4.94 K/UL

## 2022-07-22 ENCOUNTER — NON-APPOINTMENT (OUTPATIENT)
Age: 67
End: 2022-07-22

## 2022-09-01 ENCOUNTER — APPOINTMENT (OUTPATIENT)
Dept: PULMONOLOGY | Facility: CLINIC | Age: 67
End: 2022-09-01

## 2022-09-23 ENCOUNTER — APPOINTMENT (OUTPATIENT)
Dept: INTERNAL MEDICINE | Facility: CLINIC | Age: 67
End: 2022-09-23

## 2022-09-23 ENCOUNTER — APPOINTMENT (OUTPATIENT)
Dept: CARDIOLOGY | Facility: CLINIC | Age: 67
End: 2022-09-23

## 2022-09-23 ENCOUNTER — NON-APPOINTMENT (OUTPATIENT)
Age: 67
End: 2022-09-23

## 2022-09-23 VITALS
SYSTOLIC BLOOD PRESSURE: 125 MMHG | BODY MASS INDEX: 41.07 KG/M2 | DIASTOLIC BLOOD PRESSURE: 71 MMHG | TEMPERATURE: 97.1 F | OXYGEN SATURATION: 98 % | WEIGHT: 271 LBS | HEIGHT: 68 IN | HEART RATE: 74 BPM

## 2022-09-23 VITALS
DIASTOLIC BLOOD PRESSURE: 84 MMHG | OXYGEN SATURATION: 99 % | SYSTOLIC BLOOD PRESSURE: 123 MMHG | HEIGHT: 68 IN | HEART RATE: 81 BPM | TEMPERATURE: 97.3 F

## 2022-09-23 DIAGNOSIS — E79.0 HYPERURICEMIA W/OUT SIGNS OF INFLAMMATORY ARTHRITIS AND TOPHACEOUS DISEASE: ICD-10-CM

## 2022-09-23 PROCEDURE — 99213 OFFICE O/P EST LOW 20 MIN: CPT

## 2022-09-23 PROCEDURE — 93000 ELECTROCARDIOGRAM COMPLETE: CPT

## 2022-09-23 PROCEDURE — 99215 OFFICE O/P EST HI 40 MIN: CPT | Mod: 25

## 2022-09-23 RX ORDER — BUMETANIDE 1 MG/1
1 TABLET ORAL DAILY
Qty: 90 | Refills: 0 | Status: DISCONTINUED | COMMUNITY
Start: 2022-05-31 | End: 2022-09-23

## 2022-09-23 NOTE — HISTORY OF PRESENT ILLNESS
[FreeTextEntry1] : The patient is a 67 year old F who presents to the office for follow up of chronic conditions. \par  [de-identified] : The patient is a 67 year old F who presents to the office for follow up of chronic conditions. \par \par The patient says she has right lower flank pain. She says the pain is chronic but feels like it is worsening. She also notes she has been dizzy often. No recent falls. Patient desires home health aid but notes she would have to pay out of pocket. She has little assistance from her son. \par The patient has not been on allopurinol in several months or most of her medications. She last saw Dr. Willis (Renal) in Oct 2021. Pt had appt at 4pm today but was told it was cancelled because her labs had not resulted. Patient says she had labs done on Wednesday with [facility] services. \par

## 2022-09-23 NOTE — HEALTH RISK ASSESSMENT
[Never] : Never [Yes] : Yes [Monthly or less (1 pt)] : Monthly or less (1 point) [5 or 6 (2 pts)] : 5 or 6 (2  points) [Never (0 pts)] : Never (0 points) [No] : In the past 12 months have you used drugs other than those required for medical reasons? No [One fall no injury in past year] : Patient reported one fall in the past year without injury [0] : 1) Little interest or pleasure doing things: Not at all (0) [1] : 2) Feeling down, depressed, or hopeless for several days (1) [Audit-CScore] : 1 [de-identified] : pt [de-identified] : not good [[id number]] : 1

## 2022-09-23 NOTE — PHYSICAL EXAM
[No Acute Distress] : no acute distress [PERRL] : pupils equal round and reactive to light [EOMI] : extraocular movements intact [Normal] : normal rate, regular rhythm, normal S1 and S2 and no murmur heard [Soft] : abdomen soft [Non Tender] : non-tender [de-identified] : +obese [de-identified] : right lower back tenderness

## 2022-09-23 NOTE — HISTORY OF PRESENT ILLNESS
[FreeTextEntry1] : Total visit time 45min.\par MAUREEN PEAÑ 67 year F reports no dyspnea, chest pain, palpitations, syncope, claudication worsening peripheral edema. FHx CAD ;  Tobacco Negative/Reformed; DM No HLD Yes; HTN Yes; BMI: 42.\par Echo 5/22 LVEF >50%,  AR moderate; Echo 2/22 LVEF 65%, DD1 AR mild moderate. EKG 8/21 NSR  81 BPM PAC\par COPD, Class 3 obesity; DVT on apixaban. CRF Cr 2.5 range.  BNP 1800 5/12/22 mildy elevated for age (uppper limit normal 900 for > 49YO but <76yo)  more likely attributable to renal insufficiency. Anion gap acidosis of CRF  may be responsible for myalgia and systemic symptoms. Change loop diuretic to spironolactone to preclude gout (hyperuricemia) exacerbation. Recheck renal function K. Follows with Dr. Charles for nephro. Fhx CRF and no renal US on chart, ordered. RTC 1w.

## 2022-09-23 NOTE — ASSESSMENT
[FreeTextEntry1] : The patient is a 67 year old F with extensive co-morbidities who presents to the office for follow up. \par \par #CKD\par #Hyperuricemia, gout\par CrCl ~50. Patient has not yet seen renal for CKD. Per patient, appt cancelled due to missing labs. She had mobile lab services come this past Wednesday, however, all results have not yet returned. Advised the patient to make follow up appt with renal ASAP. Offered repeat labs here in office. She will instead follow up with mobile services. Patient given a copy of July 2022 labs \par Unclear on allopurinol dosing given renal function, for now will c/w 300mg. \par \par #HFpEF: Pt med noncompliant. Follow up with cardiology today. Discussed symptoms may be managed with medications. \par \par #Right lower back pain: likely musculoskeletal in nature. Last renal US in 2018 nl. Patient unable to leave urine sample. Will follow up labs from Wednesday. Advised to avoid NSAIDs. Tylenol prn. \par \par #hypothyroidism: Patient notes she had not been taking levo 150mcg because she did not receive the prescription. Medication refilled. \par \par ED precautions provided. Patient verbalized understanding

## 2022-09-26 RX ORDER — CHOLECALCIFEROL (VITAMIN D3) 1250 MCG
1.25 MG CAPSULE ORAL
Qty: 12 | Refills: 0 | Status: ACTIVE | COMMUNITY
Start: 2018-02-27 | End: 1900-01-01

## 2022-09-26 RX ORDER — CALCITRIOL 0.25 UG/1
0.25 CAPSULE, LIQUID FILLED ORAL
Qty: 90 | Refills: 0 | Status: ACTIVE | COMMUNITY
Start: 2022-05-31 | End: 1900-01-01

## 2022-09-30 ENCOUNTER — APPOINTMENT (OUTPATIENT)
Dept: CARDIOLOGY | Facility: CLINIC | Age: 67
End: 2022-09-30

## 2022-09-30 NOTE — DISCHARGE NOTE NURSING/CASE MANAGEMENT/SOCIAL WORK - FLU SEASON?
Plan: .\\n.\\nDupixent PA started today.  \\nLab order given to patient Render In Strict Bullet Format?: No Detail Level: Simple Initiate Treatment: Braxton [place] face with gentle cleanser \\nApply cicaplast from LaRoche posay or cilcafate from avene \\nPM\\nWash with Gentle cleanser \\nApply cicaplast from LaRoche posay or cilcafate from [employer]vene \\nApply Triamcinolone oitment \\n\\nApply Triamcinolone . 025% oitment am and pm to the affected areas of the face (under eyes) x 2 weeks then use once a say for 2 weeks. Yes...

## 2022-10-07 ENCOUNTER — APPOINTMENT (OUTPATIENT)
Dept: CARDIOLOGY | Facility: CLINIC | Age: 67
End: 2022-10-07

## 2022-10-07 ENCOUNTER — LABORATORY RESULT (OUTPATIENT)
Age: 67
End: 2022-10-07

## 2022-10-07 ENCOUNTER — APPOINTMENT (OUTPATIENT)
Dept: INTERNAL MEDICINE | Facility: CLINIC | Age: 67
End: 2022-10-07

## 2022-10-07 VITALS
SYSTOLIC BLOOD PRESSURE: 138 MMHG | HEIGHT: 68 IN | DIASTOLIC BLOOD PRESSURE: 72 MMHG | BODY MASS INDEX: 39.16 KG/M2 | WEIGHT: 258.38 LBS | TEMPERATURE: 97.3 F | HEART RATE: 68 BPM | OXYGEN SATURATION: 100 %

## 2022-10-07 PROCEDURE — 36415 COLL VENOUS BLD VENIPUNCTURE: CPT

## 2022-10-07 PROCEDURE — G0009: CPT

## 2022-10-07 PROCEDURE — 99214 OFFICE O/P EST MOD 30 MIN: CPT | Mod: 25

## 2022-10-07 PROCEDURE — 90472 IMMUNIZATION ADMIN EACH ADD: CPT

## 2022-10-07 PROCEDURE — 90662 IIV NO PRSV INCREASED AG IM: CPT

## 2022-10-07 PROCEDURE — 90677 PCV20 VACCINE IM: CPT

## 2022-10-07 PROCEDURE — 99214 OFFICE O/P EST MOD 30 MIN: CPT

## 2022-10-07 NOTE — REVIEW OF SYSTEMS
[Shortness Of Breath] : shortness of breath [Negative] : Neurological [de-identified] : uses a wheelchair as a walker and a seat

## 2022-10-07 NOTE — HISTORY OF PRESENT ILLNESS
[FreeTextEntry1] : follow up [de-identified] : patient here for follow up\par \par patient with hypothyroid and CKD and CHF \par \par needs blood work before seeing nephrology\par \par

## 2022-10-07 NOTE — ASSESSMENT
[FreeTextEntry1] : - MAUREEN PEÑA with multiple comorbidities now clinically stable\par - further recommendations pending labs\par - continue current medications\par \par HTN- stable\par \par \par CKD 4- needs blood work before seeing renal\par \par CHF- follow up with cardio\par never got spirololactone as was not sent to mail order\par will reorder to mail pharmacy\par

## 2022-10-07 NOTE — HEALTH RISK ASSESSMENT
[Never] : Never [Yes] : Yes [2 - 3 times a week (3 pts)] : 2 - 3  times a week (3 points) [1 or 2 (0 pts)] : 1 or 2 (0 points) [Never (0 pts)] : Never (0 points) [No] : In the past 12 months have you used drugs other than those required for medical reasons? No [No falls in past year] : Patient reported no falls in the past year [0] : 2) Feeling down, depressed, or hopeless: Not at all (0) [de-identified] : No [de-identified] : Dr Contreras  9/2022 [Audit-CScore] : 3 [de-identified] : not active  [de-identified] : poor  [[id number]] : 0

## 2022-10-07 NOTE — HISTORY OF PRESENT ILLNESS
[FreeTextEntry1] : Total visit time 45min.\par MAUREEN PEÑA 67 year F reports worsening peripheral edema. FHx CAD; \par Tobacco Negative/Reformed; DM No HLD Yes; HTN Yes; BMI: 42.\par Echo 5/22 LVEF >50%,  AR moderate; Echo 2/22 LVEF 65%, DD1 AR mild moderate. \par EKG 8/21 NSR  81 BPM PAC\par COPD, Class 3 obesity; DVT on apixaban. CRF Cr 2.5 range.  BNP 1800 5/12/22 mildy elevated for age (upper limit normal 900 for > 51YO but <76yo)  more likely attributable to renal insufficiency.. Follows with Dr. Willis for nephro. Fhx CRF and no renal US on chart, ordered. Resent spironolactone script to [facility] as requested. Blood work pending.RTC 2w. Renal doppler not yet done

## 2022-10-11 LAB
ALBUMIN SERPL ELPH-MCNC: 4 G/DL
ALP BLD-CCNC: 211 U/L
ALT SERPL-CCNC: 12 U/L
ANION GAP SERPL CALC-SCNC: 14 MMOL/L
AST SERPL-CCNC: 25 U/L
BASOPHILS # BLD AUTO: 0 K/UL
BASOPHILS NFR BLD AUTO: 0 %
BILIRUB SERPL-MCNC: 0.3 MG/DL
BUN SERPL-MCNC: 16 MG/DL
CALCIUM SERPL-MCNC: 7.6 MG/DL
CHLORIDE SERPL-SCNC: 114 MMOL/L
CHOLEST SERPL-MCNC: 161 MG/DL
CO2 SERPL-SCNC: 13 MMOL/L
CREAT SERPL-MCNC: 2.24 MG/DL
EGFR: 24 ML/MIN/1.73M2
EOSINOPHIL # BLD AUTO: 0.17 K/UL
EOSINOPHIL NFR BLD AUTO: 3.5 %
ESTIMATED AVERAGE GLUCOSE: 97 MG/DL
FERRITIN SERPL-MCNC: 175 NG/ML
FOLATE SERPL-MCNC: 7.5 NG/ML
GLUCOSE SERPL-MCNC: 95 MG/DL
HBA1C MFR BLD HPLC: 5 %
HCT VFR BLD CALC: 29.2 %
HDLC SERPL-MCNC: 107 MG/DL
HGB BLD-MCNC: 8.8 G/DL
IRON SATN MFR SERPL: 56 %
IRON SERPL-MCNC: 148 UG/DL
LDLC SERPL CALC-MCNC: 35 MG/DL
LYMPHOCYTES # BLD AUTO: 1.55 K/UL
LYMPHOCYTES NFR BLD AUTO: 31.6 %
MAN DIFF?: NORMAL
MCHC RBC-ENTMCNC: 30.1 GM/DL
MCHC RBC-ENTMCNC: 33.1 PG
MCV RBC AUTO: 109.8 FL
MONOCYTES # BLD AUTO: 0.26 K/UL
MONOCYTES NFR BLD AUTO: 5.3 %
NEUTROPHILS # BLD AUTO: 2.93 K/UL
NEUTROPHILS NFR BLD AUTO: 59.6 %
NONHDLC SERPL-MCNC: 54 MG/DL
PLATELET # BLD AUTO: 243 K/UL
POTASSIUM SERPL-SCNC: 4.3 MMOL/L
PROT SERPL-MCNC: 7 G/DL
RBC # BLD: 2.66 M/UL
RBC # FLD: 17.1 %
SODIUM SERPL-SCNC: 141 MMOL/L
TIBC SERPL-MCNC: 264 UG/DL
TRIGL SERPL-MCNC: 92 MG/DL
TSH SERPL-ACNC: 8.01 UIU/ML
UIBC SERPL-MCNC: 116 UG/DL
VIT B12 SERPL-MCNC: 689 PG/ML
WBC # FLD AUTO: 4.92 K/UL

## 2022-11-03 ENCOUNTER — APPOINTMENT (OUTPATIENT)
Dept: PULMONOLOGY | Facility: CLINIC | Age: 67
End: 2022-11-03

## 2022-11-03 VITALS
SYSTOLIC BLOOD PRESSURE: 116 MMHG | BODY MASS INDEX: 39.4 KG/M2 | WEIGHT: 260 LBS | HEIGHT: 68 IN | TEMPERATURE: 97.8 F | RESPIRATION RATE: 15 BRPM | HEART RATE: 59 BPM | OXYGEN SATURATION: 97 % | DIASTOLIC BLOOD PRESSURE: 72 MMHG

## 2022-11-03 DIAGNOSIS — Z99.89 OBSTRUCTIVE SLEEP APNEA (ADULT) (PEDIATRIC): ICD-10-CM

## 2022-11-03 DIAGNOSIS — G47.33 OBSTRUCTIVE SLEEP APNEA (ADULT) (PEDIATRIC): ICD-10-CM

## 2022-11-03 PROCEDURE — 94010 BREATHING CAPACITY TEST: CPT

## 2022-11-03 PROCEDURE — 99214 OFFICE O/P EST MOD 30 MIN: CPT | Mod: 25

## 2022-11-03 PROCEDURE — ZZZZZ: CPT

## 2022-11-03 RX ORDER — ALBUTEROL SULFATE 2.5 MG/3ML
(2.5 MG/3ML) SOLUTION RESPIRATORY (INHALATION)
Qty: 1080 | Refills: 3 | Status: ACTIVE | COMMUNITY
Start: 2020-07-10 | End: 1900-01-01

## 2022-11-03 NOTE — PHYSICAL EXAM
[No Acute Distress] : no acute distress [Well Nourished] : well nourished [Well Developed] : well developed [No Resp Distress] : no resp distress [No Acc Muscle Use] : no acc muscle use [Oriented x3] : oriented x3 [TextBox_132] : ambulates with assist device

## 2022-11-03 NOTE — PROCEDURE
[FreeTextEntry1] : xray 5/2022\par \par no compliance data from machine\par \par moderate obstruction on erum today 10/2022

## 2022-11-03 NOTE — HISTORY OF PRESENT ILLNESS
[Never] : never [TextBox_4] : MAUREEN PEÑA is a 67 year old female who presents for f/u\par she has not been seen in almost 6  months\par has been seen by cardio (CHF) and renal (CKD- on HD yet) \par \par 1) SONU- reports having a machien and subjective compliance\par DME company reports poor use\par she did not bring into ffice\par she sleesp with machine and 3L \par \par 2) history of 'respiratory failure" - happened winter 2000, went to a [employer]\par she has 02 at home\par DME: [employer]\par on trelegy\par not using combivent\par \par \par 3) DVT- diagnosed 2 years ago \par on eliquis. no bleeding issues\par reportedly no PE\par diagnosed in [facility]\par \par 4) dyspnea ongoing- improving sinc eweight loss\par being managed for fluid overload with renal and cardio\par \par  \par Smoking Status: never \par eCigarettes: never \par Marijuana use: never \par

## 2022-11-04 ENCOUNTER — APPOINTMENT (OUTPATIENT)
Dept: INTERNAL MEDICINE | Facility: CLINIC | Age: 67
End: 2022-11-04

## 2022-11-08 ENCOUNTER — LABORATORY RESULT (OUTPATIENT)
Age: 67
End: 2022-11-08

## 2022-11-08 ENCOUNTER — APPOINTMENT (OUTPATIENT)
Dept: INTERNAL MEDICINE | Facility: CLINIC | Age: 67
End: 2022-11-08

## 2022-11-08 ENCOUNTER — APPOINTMENT (OUTPATIENT)
Dept: CARDIOLOGY | Facility: CLINIC | Age: 67
End: 2022-11-08

## 2022-11-08 VITALS
DIASTOLIC BLOOD PRESSURE: 66 MMHG | TEMPERATURE: 97.4 F | HEART RATE: 66 BPM | OXYGEN SATURATION: 90 % | HEIGHT: 68 IN | SYSTOLIC BLOOD PRESSURE: 152 MMHG | WEIGHT: 260 LBS | BODY MASS INDEX: 39.4 KG/M2

## 2022-11-08 VITALS — DIASTOLIC BLOOD PRESSURE: 88 MMHG | SYSTOLIC BLOOD PRESSURE: 148 MMHG

## 2022-11-08 DIAGNOSIS — E66.01 MORBID (SEVERE) OBESITY DUE TO EXCESS CALORIES: ICD-10-CM

## 2022-11-08 PROCEDURE — 36415 COLL VENOUS BLD VENIPUNCTURE: CPT

## 2022-11-08 PROCEDURE — 99213 OFFICE O/P EST LOW 20 MIN: CPT | Mod: 25

## 2022-11-10 LAB
ALBUMIN SERPL ELPH-MCNC: 3.8 G/DL
ALP BLD-CCNC: 215 U/L
ALT SERPL-CCNC: 14 U/L
ANION GAP SERPL CALC-SCNC: 12 MMOL/L
AST SERPL-CCNC: 27 U/L
BASOPHILS # BLD AUTO: 0.01 K/UL
BASOPHILS NFR BLD AUTO: 0.2 %
BILIRUB SERPL-MCNC: 0.2 MG/DL
BUN SERPL-MCNC: 20 MG/DL
CALCIUM SERPL-MCNC: 7.3 MG/DL
CHLORIDE SERPL-SCNC: 115 MMOL/L
CO2 SERPL-SCNC: 13 MMOL/L
CREAT SERPL-MCNC: 2.38 MG/DL
EGFR: 22 ML/MIN/1.73M2
EOSINOPHIL # BLD AUTO: 0.07 K/UL
EOSINOPHIL NFR BLD AUTO: 1.4 %
FERRITIN SERPL-MCNC: 204 NG/ML
GLUCOSE SERPL-MCNC: 102 MG/DL
HCT VFR BLD CALC: 30.8 %
HGB BLD-MCNC: 9.2 G/DL
IMM GRANULOCYTES NFR BLD AUTO: 0.2 %
IRON SATN MFR SERPL: 50 %
IRON SERPL-MCNC: 119 UG/DL
LYMPHOCYTES # BLD AUTO: 1.39 K/UL
LYMPHOCYTES NFR BLD AUTO: 27.4 %
MAN DIFF?: NORMAL
MCHC RBC-ENTMCNC: 29.9 GM/DL
MCHC RBC-ENTMCNC: 32.6 PG
MCV RBC AUTO: 109.2 FL
MONOCYTES # BLD AUTO: 0.35 K/UL
MONOCYTES NFR BLD AUTO: 6.9 %
NEUTROPHILS # BLD AUTO: 3.25 K/UL
NEUTROPHILS NFR BLD AUTO: 63.9 %
NT-PROBNP SERPL-MCNC: 3108 PG/ML
PLATELET # BLD AUTO: 207 K/UL
POTASSIUM SERPL-SCNC: 4.6 MMOL/L
PROT SERPL-MCNC: 7 G/DL
RBC # BLD: 2.82 M/UL
RBC # FLD: 15.6 %
SODIUM SERPL-SCNC: 141 MMOL/L
TIBC SERPL-MCNC: 238 UG/DL
UIBC SERPL-MCNC: 119 UG/DL
WBC # FLD AUTO: 5.08 K/UL

## 2022-11-10 NOTE — ASSESSMENT
[FreeTextEntry1] : 67 year old female presents for follow-up CKD, CHF, COPD\par Labs drawn in-office\par Further management pending lab results\par Patient care will be transferred back to Dr. Davalos per patient request.\par Will discuss case with Dr. Davalos

## 2022-11-10 NOTE — HISTORY OF PRESENT ILLNESS
[FreeTextEntry1] : 67 year old female presents for follow-up CKD, CHF, COPD [de-identified] : 67 year old female presents for follow-up CKD, CHF, COPD\par \par Elevated BP in office today.\par Patient reports increase in salt intake, loss of taste last few months\par Denies COVID dx\par Patient previously followed by Dr. Rees reports positive experience, accessible via access-a-ride\par \par Reports reduction in gout flares without changes to allopurinol rx.\par \par Nephrology appointment 11/04/22\par Cr 2.02, eGFR 26mL/min\par Continue current regimen, per nephrology note\par F/u 2 months\par Per patient, Dr. Willis reviewed copy of renal US completed in hospital\par \par \par Followed by pulmonology\par TALYA combivent. Currently using trellegy, nebulizer treatments x 2\par Inconsistent CPAP use, reports difficulty sleeping.\par Patient to f/u with pulmonology regarding settings for CPAP\par \par Receiving occupational, physical therapy, no HHA assigned\par Patient has not been able to follow-up on pooled trust, medicaid

## 2022-11-10 NOTE — COUNSELING
[Inadequate social support] : Inadequate social support [Transportation Issues] : Transportation issues [Financial issues] : Financial issues [Needs reinforcement, provided] : Patient needs reinforcement on understanding of lifestyle changes and steps needed to achieve self management goal; reinforcement was provided [de-identified] : Low sodium, reduced calorie, low-sugar, low-fat diet

## 2022-11-16 ENCOUNTER — NON-APPOINTMENT (OUTPATIENT)
Age: 67
End: 2022-11-16

## 2022-12-07 ENCOUNTER — NON-APPOINTMENT (OUTPATIENT)
Age: 67
End: 2022-12-07

## 2023-01-19 ENCOUNTER — RX RENEWAL (OUTPATIENT)
Age: 68
End: 2023-01-19

## 2023-02-09 ENCOUNTER — APPOINTMENT (OUTPATIENT)
Dept: INTERNAL MEDICINE | Facility: CLINIC | Age: 68
End: 2023-02-09
Payer: MEDICARE

## 2023-02-09 VITALS
WEIGHT: 251 LBS | DIASTOLIC BLOOD PRESSURE: 74 MMHG | SYSTOLIC BLOOD PRESSURE: 122 MMHG | HEART RATE: 73 BPM | TEMPERATURE: 97.3 F | HEIGHT: 68 IN | OXYGEN SATURATION: 98 % | BODY MASS INDEX: 38.04 KG/M2

## 2023-02-09 PROCEDURE — 36415 COLL VENOUS BLD VENIPUNCTURE: CPT

## 2023-02-09 PROCEDURE — 99214 OFFICE O/P EST MOD 30 MIN: CPT | Mod: 25

## 2023-02-09 RX ORDER — ALBUTEROL SULFATE 90 UG/1
108 (90 BASE) INHALANT RESPIRATORY (INHALATION)
Qty: 1 | Refills: 2 | Status: ACTIVE | COMMUNITY
Start: 2023-02-09 | End: 1900-01-01

## 2023-02-09 NOTE — ASSESSMENT
[FreeTextEntry1] : Blood drawn in office.\par \par Will attempt to set up home care for Mrs. Henry.\par

## 2023-02-09 NOTE — HEALTH RISK ASSESSMENT
[Yes] : Yes [4 or more  times a week (4 pts)] : 4 or more  times a week (4 points) [1 or 2 (0 pts)] : 1 or 2 (0 points) [Never (0 pts)] : Never (0 points) [No] : In the past 12 months have you used drugs other than those required for medical reasons? No [Any fall with injury in past year] : Patient reported fall with injury in the past year [0] : 2) Feeling down, depressed, or hopeless: Not at all (0) [de-identified] : no [de-identified] : no [Audit-CScore] : 4 [de-identified] : not active, SOB  [de-identified] : poor  [[id number]] : 0

## 2023-02-09 NOTE — HISTORY OF PRESENT ILLNESS
[de-identified] : 66 yo F presenting to follow up chronic dyspnea on exertion. She is aware of her CRF being the etiology of much of her discomfort but her nephrologist feels her kidney function is stable and currently has no further recommendations. She needs assistance at home as she lives alone. Her son helps when he can. \par \par

## 2023-02-09 NOTE — PHYSICAL EXAM
[No Acute Distress] : no acute distress [No Respiratory Distress] : no respiratory distress  [No Accessory Muscle Use] : no accessory muscle use [Clear to Auscultation] : lungs were clear to auscultation bilaterally [Normal Rate] : normal rate  [Regular Rhythm] : with a regular rhythm [Normal S1, S2] : normal S1 and S2 [No Edema] : there was no peripheral edema [No Spinal Tenderness] : no spinal tenderness [Grossly Normal Strength/Tone] : grossly normal strength/tone [Normal Affect] : the affect was normal [Alert and Oriented x3] : oriented to person, place, and time [de-identified] : walks with a walker

## 2023-02-11 LAB
ALBUMIN SERPL ELPH-MCNC: 4 G/DL
ALP BLD-CCNC: 205 U/L
ALT SERPL-CCNC: 14 U/L
ANION GAP SERPL CALC-SCNC: 15 MMOL/L
AST SERPL-CCNC: 21 U/L
BASOPHILS # BLD AUTO: 0.01 K/UL
BASOPHILS NFR BLD AUTO: 0.2 %
BILIRUB SERPL-MCNC: 0.3 MG/DL
BUN SERPL-MCNC: 25 MG/DL
CALCIUM SERPL-MCNC: 7.7 MG/DL
CHLORIDE SERPL-SCNC: 113 MMOL/L
CHOLEST SERPL-MCNC: 144 MG/DL
CO2 SERPL-SCNC: 11 MMOL/L
CREAT SERPL-MCNC: 2.77 MG/DL
DEPRECATED D DIMER PPP IA-ACNC: 177 NG/ML DDU
EGFR: 18 ML/MIN/1.73M2
EOSINOPHIL # BLD AUTO: 0.03 K/UL
EOSINOPHIL NFR BLD AUTO: 0.6 %
ESTIMATED AVERAGE GLUCOSE: 88 MG/DL
GLUCOSE SERPL-MCNC: 92 MG/DL
HBA1C MFR BLD HPLC: 4.7 %
HCT VFR BLD CALC: 30.1 %
HDLC SERPL-MCNC: 74 MG/DL
HGB BLD-MCNC: 9.2 G/DL
IMM GRANULOCYTES NFR BLD AUTO: 0.2 %
LDLC SERPL CALC-MCNC: 52 MG/DL
LYMPHOCYTES # BLD AUTO: 1.59 K/UL
LYMPHOCYTES NFR BLD AUTO: 30.9 %
MAN DIFF?: NORMAL
MCHC RBC-ENTMCNC: 30.6 GM/DL
MCHC RBC-ENTMCNC: 32.5 PG
MCV RBC AUTO: 106.4 FL
MONOCYTES # BLD AUTO: 0.33 K/UL
MONOCYTES NFR BLD AUTO: 6.4 %
NEUTROPHILS # BLD AUTO: 3.18 K/UL
NEUTROPHILS NFR BLD AUTO: 61.7 %
NONHDLC SERPL-MCNC: 70 MG/DL
NT-PROBNP SERPL-MCNC: 2070 PG/ML
PLATELET # BLD AUTO: 237 K/UL
POTASSIUM SERPL-SCNC: 5.1 MMOL/L
PROT SERPL-MCNC: 7.1 G/DL
RBC # BLD: 2.83 M/UL
RBC # FLD: 15.7 %
SODIUM SERPL-SCNC: 139 MMOL/L
TRIGL SERPL-MCNC: 90 MG/DL
TSH SERPL-ACNC: 0.84 UIU/ML
VIT B12 SERPL-MCNC: 534 PG/ML
WBC # FLD AUTO: 5.15 K/UL

## 2023-03-09 ENCOUNTER — APPOINTMENT (OUTPATIENT)
Dept: CARDIOLOGY | Facility: CLINIC | Age: 68
End: 2023-03-09
Payer: MEDICARE

## 2023-03-09 ENCOUNTER — APPOINTMENT (OUTPATIENT)
Dept: INTERNAL MEDICINE | Facility: CLINIC | Age: 68
End: 2023-03-09
Payer: MEDICARE

## 2023-03-09 ENCOUNTER — NON-APPOINTMENT (OUTPATIENT)
Age: 68
End: 2023-03-09

## 2023-03-09 VITALS
HEIGHT: 68 IN | HEART RATE: 76 BPM | BODY MASS INDEX: 36.68 KG/M2 | OXYGEN SATURATION: 95 % | DIASTOLIC BLOOD PRESSURE: 61 MMHG | TEMPERATURE: 96.7 F | SYSTOLIC BLOOD PRESSURE: 107 MMHG | WEIGHT: 242 LBS

## 2023-03-09 DIAGNOSIS — J06.9 ACUTE UPPER RESPIRATORY INFECTION, UNSPECIFIED: ICD-10-CM

## 2023-03-09 PROCEDURE — 93306 TTE W/DOPPLER COMPLETE: CPT

## 2023-03-09 PROCEDURE — 99214 OFFICE O/P EST MOD 30 MIN: CPT

## 2023-03-12 PROBLEM — J06.9 ACUTE UPPER RESPIRATORY INFECTION: Status: ACTIVE | Noted: 2018-01-25

## 2023-03-12 NOTE — PHYSICAL EXAM
[No Respiratory Distress] : no respiratory distress  [No Acute Distress] : no acute distress [No Accessory Muscle Use] : no accessory muscle use [Clear to Auscultation] : lungs were clear to auscultation bilaterally [Normal Rate] : normal rate  [Regular Rhythm] : with a regular rhythm [Normal S1, S2] : normal S1 and S2 [No Edema] : there was no peripheral edema [No Spinal Tenderness] : no spinal tenderness [Grossly Normal Strength/Tone] : grossly normal strength/tone [Normal Affect] : the affect was normal [Alert and Oriented x3] : oriented to person, place, and time [de-identified] : in a wheelchair

## 2023-03-12 NOTE — HISTORY OF PRESENT ILLNESS
[FreeTextEntry8] : 66 yo F with multiple co-morbidities including HTN, COPD and CKD stage 4 presenting with multiple complaints of dizziness, vision problems and inability to function with ADLs. She is interested in having a health aide at home to assist her a few times a week. She has a son who lives nearby who helps out but would like more help. She was seen in the past for dizziness and was noted to have postural hypotension, but states she has some ringing in her ears. Her vision problems consist of floaters and dark spots. She wears glasses.\par \par Only takes Eliquis once daily, not twice. \par \par Concerned about an upper respiratory infection.

## 2023-04-12 ENCOUNTER — RX RENEWAL (OUTPATIENT)
Age: 68
End: 2023-04-12

## 2023-07-13 ENCOUNTER — TRANSCRIPTION ENCOUNTER (OUTPATIENT)
Age: 68
End: 2023-07-13

## 2023-07-19 ENCOUNTER — APPOINTMENT (OUTPATIENT)
Dept: INTERNAL MEDICINE | Facility: CLINIC | Age: 68
End: 2023-07-19

## 2023-08-09 ENCOUNTER — NON-APPOINTMENT (OUTPATIENT)
Age: 68
End: 2023-08-09

## 2023-08-09 ENCOUNTER — APPOINTMENT (OUTPATIENT)
Dept: INTERNAL MEDICINE | Facility: CLINIC | Age: 68
End: 2023-08-09
Payer: MEDICARE

## 2023-08-09 ENCOUNTER — INPATIENT (INPATIENT)
Facility: HOSPITAL | Age: 68
LOS: 18 days | Discharge: NURSING, REHAB OR LONG-TERM CARE | End: 2023-08-28
Attending: HOSPITALIST | Admitting: HOSPITALIST
Payer: MEDICARE

## 2023-08-09 VITALS
DIASTOLIC BLOOD PRESSURE: 67 MMHG | OXYGEN SATURATION: 100 % | SYSTOLIC BLOOD PRESSURE: 116 MMHG | HEART RATE: 74 BPM | HEIGHT: 68 IN | TEMPERATURE: 97 F | RESPIRATION RATE: 24 BRPM | WEIGHT: 235.89 LBS

## 2023-08-09 VITALS
TEMPERATURE: 97 F | HEIGHT: 68 IN | SYSTOLIC BLOOD PRESSURE: 105 MMHG | HEART RATE: 83 BPM | WEIGHT: 236 LBS | DIASTOLIC BLOOD PRESSURE: 67 MMHG | OXYGEN SATURATION: 98 % | BODY MASS INDEX: 35.77 KG/M2

## 2023-08-09 DIAGNOSIS — D12.3 BENIGN NEOPLASM OF TRANSVERSE COLON: ICD-10-CM

## 2023-08-09 DIAGNOSIS — D62 ACUTE POSTHEMORRHAGIC ANEMIA: ICD-10-CM

## 2023-08-09 DIAGNOSIS — K92.1 MELENA: ICD-10-CM

## 2023-08-09 DIAGNOSIS — K64.8 OTHER HEMORRHOIDS: ICD-10-CM

## 2023-08-09 DIAGNOSIS — I48.0 PAROXYSMAL ATRIAL FIBRILLATION: ICD-10-CM

## 2023-08-09 DIAGNOSIS — E66.9 OBESITY, UNSPECIFIED: ICD-10-CM

## 2023-08-09 DIAGNOSIS — Z86.718 PERSONAL HISTORY OF OTHER VENOUS THROMBOSIS AND EMBOLISM: ICD-10-CM

## 2023-08-09 DIAGNOSIS — E87.4 MIXED DISORDER OF ACID-BASE BALANCE: ICD-10-CM

## 2023-08-09 DIAGNOSIS — Z97.5 PRESENCE OF (INTRAUTERINE) CONTRACEPTIVE DEVICE: ICD-10-CM

## 2023-08-09 DIAGNOSIS — Z79.51 LONG TERM (CURRENT) USE OF INHALED STEROIDS: ICD-10-CM

## 2023-08-09 DIAGNOSIS — G47.33 OBSTRUCTIVE SLEEP APNEA (ADULT) (PEDIATRIC): ICD-10-CM

## 2023-08-09 DIAGNOSIS — R77.8 OTHER SPECIFIED ABNORMALITIES OF PLASMA PROTEINS: ICD-10-CM

## 2023-08-09 DIAGNOSIS — Z86.19 PERSONAL HISTORY OF OTHER INFECTIOUS AND PARASITIC DISEASES: ICD-10-CM

## 2023-08-09 DIAGNOSIS — R07.9 CHEST PAIN, UNSPECIFIED: ICD-10-CM

## 2023-08-09 DIAGNOSIS — K57.31 DIVERTICULOSIS OF LARGE INTESTINE WITHOUT PERFORATION OR ABSCESS WITH BLEEDING: ICD-10-CM

## 2023-08-09 DIAGNOSIS — E87.3 ALKALOSIS: ICD-10-CM

## 2023-08-09 DIAGNOSIS — M10.9 GOUT, UNSPECIFIED: ICD-10-CM

## 2023-08-09 DIAGNOSIS — Z98.84 BARIATRIC SURGERY STATUS: ICD-10-CM

## 2023-08-09 DIAGNOSIS — T50.B95A SHORTNESS OF BREATH: ICD-10-CM

## 2023-08-09 DIAGNOSIS — R55 SYNCOPE AND COLLAPSE: ICD-10-CM

## 2023-08-09 DIAGNOSIS — J96.11 CHRONIC RESPIRATORY FAILURE WITH HYPOXIA: ICD-10-CM

## 2023-08-09 DIAGNOSIS — J44.9 CHRONIC OBSTRUCTIVE PULMONARY DISEASE, UNSPECIFIED: ICD-10-CM

## 2023-08-09 DIAGNOSIS — N18.4 CHRONIC KIDNEY DISEASE, STAGE 4 (SEVERE): ICD-10-CM

## 2023-08-09 DIAGNOSIS — E87.5 HYPERKALEMIA: ICD-10-CM

## 2023-08-09 DIAGNOSIS — Z41.9 ENCOUNTER FOR PROCEDURE FOR PURPOSES OTHER THAN REMEDYING HEALTH STATE, UNSPECIFIED: Chronic | ICD-10-CM

## 2023-08-09 DIAGNOSIS — M71.21 SYNOVIAL CYST OF POPLITEAL SPACE [BAKER], RIGHT KNEE: ICD-10-CM

## 2023-08-09 DIAGNOSIS — M17.11 UNILATERAL PRIMARY OSTEOARTHRITIS, RIGHT KNEE: ICD-10-CM

## 2023-08-09 DIAGNOSIS — E03.9 HYPOTHYROIDISM, UNSPECIFIED: ICD-10-CM

## 2023-08-09 DIAGNOSIS — K55.21 ANGIODYSPLASIA OF COLON WITH HEMORRHAGE: ICD-10-CM

## 2023-08-09 DIAGNOSIS — Z98.84 BARIATRIC SURGERY STATUS: Chronic | ICD-10-CM

## 2023-08-09 DIAGNOSIS — N17.9 ACUTE KIDNEY FAILURE, UNSPECIFIED: ICD-10-CM

## 2023-08-09 DIAGNOSIS — I44.0 ATRIOVENTRICULAR BLOCK, FIRST DEGREE: ICD-10-CM

## 2023-08-09 DIAGNOSIS — Z79.890 HORMONE REPLACEMENT THERAPY: ICD-10-CM

## 2023-08-09 DIAGNOSIS — I13.0 HYPERTENSIVE HEART AND CHRONIC KIDNEY DISEASE WITH HEART FAILURE AND STAGE 1 THROUGH STAGE 4 CHRONIC KIDNEY DISEASE, OR UNSPECIFIED CHRONIC KIDNEY DISEASE: ICD-10-CM

## 2023-08-09 DIAGNOSIS — R06.02 SHORTNESS OF BREATH: ICD-10-CM

## 2023-08-09 DIAGNOSIS — Z79.01 LONG TERM (CURRENT) USE OF ANTICOAGULANTS: ICD-10-CM

## 2023-08-09 LAB
ALBUMIN SERPL ELPH-MCNC: 3 G/DL — LOW (ref 3.3–5)
ALP SERPL-CCNC: 137 U/L — HIGH (ref 40–120)
ALT FLD-CCNC: 15 U/L — SIGNIFICANT CHANGE UP (ref 12–78)
ANION GAP SERPL CALC-SCNC: 6 MMOL/L — SIGNIFICANT CHANGE UP (ref 5–17)
ANISOCYTOSIS BLD QL: SLIGHT — SIGNIFICANT CHANGE UP
APTT BLD: 28.3 SEC — SIGNIFICANT CHANGE UP (ref 24.5–35.6)
AST SERPL-CCNC: 18 U/L — SIGNIFICANT CHANGE UP (ref 15–37)
BASOPHILS # BLD AUTO: 0.01 K/UL — SIGNIFICANT CHANGE UP (ref 0–0.2)
BASOPHILS NFR BLD AUTO: 0.1 % — SIGNIFICANT CHANGE UP (ref 0–2)
BILIRUB SERPL-MCNC: 0.2 MG/DL — SIGNIFICANT CHANGE UP (ref 0.2–1.2)
BLD GP AB SCN SERPL QL: SIGNIFICANT CHANGE UP
BUN SERPL-MCNC: 32 MG/DL — HIGH (ref 7–23)
BURR CELLS BLD QL SMEAR: PRESENT — SIGNIFICANT CHANGE UP
CALCIUM SERPL-MCNC: 7.6 MG/DL — LOW (ref 8.5–10.1)
CHLORIDE SERPL-SCNC: 119 MMOL/L — HIGH (ref 96–108)
CO2 SERPL-SCNC: 12 MMOL/L — LOW (ref 22–31)
CREAT SERPL-MCNC: 3.27 MG/DL — HIGH (ref 0.5–1.3)
DACRYOCYTES BLD QL SMEAR: SLIGHT — SIGNIFICANT CHANGE UP
EGFR: 15 ML/MIN/1.73M2 — LOW
ELLIPTOCYTES BLD QL SMEAR: SLIGHT — SIGNIFICANT CHANGE UP
EOSINOPHIL # BLD AUTO: 0.01 K/UL — SIGNIFICANT CHANGE UP (ref 0–0.5)
EOSINOPHIL NFR BLD AUTO: 0.1 % — SIGNIFICANT CHANGE UP (ref 0–6)
GLUCOSE SERPL-MCNC: 89 MG/DL — SIGNIFICANT CHANGE UP (ref 70–99)
HCT VFR BLD CALC: 25.9 % — LOW (ref 34.5–45)
HGB BLD-MCNC: 7.8 G/DL — LOW (ref 11.5–15.5)
HYPOCHROMIA BLD QL: SLIGHT — SIGNIFICANT CHANGE UP
IMM GRANULOCYTES NFR BLD AUTO: 0.6 % — SIGNIFICANT CHANGE UP (ref 0–0.9)
INR BLD: 1.07 RATIO — SIGNIFICANT CHANGE UP (ref 0.85–1.18)
LYMPHOCYTES # BLD AUTO: 1.12 K/UL — SIGNIFICANT CHANGE UP (ref 1–3.3)
LYMPHOCYTES # BLD AUTO: 15.6 % — SIGNIFICANT CHANGE UP (ref 13–44)
MANUAL SMEAR VERIFICATION: YES — SIGNIFICANT CHANGE UP
MCHC RBC-ENTMCNC: 30.1 G/DL — LOW (ref 32–36)
MCHC RBC-ENTMCNC: 32.1 PG — SIGNIFICANT CHANGE UP (ref 27–34)
MCV RBC AUTO: 106.6 FL — HIGH (ref 80–100)
MONOCYTES # BLD AUTO: 0.36 K/UL — SIGNIFICANT CHANGE UP (ref 0–0.9)
MONOCYTES NFR BLD AUTO: 5 % — SIGNIFICANT CHANGE UP (ref 2–14)
NEUTROPHILS # BLD AUTO: 5.66 K/UL — SIGNIFICANT CHANGE UP (ref 1.8–7.4)
NEUTROPHILS NFR BLD AUTO: 78.6 % — HIGH (ref 43–77)
NRBC # BLD: 0 /100 WBCS — SIGNIFICANT CHANGE UP (ref 0–0)
PLAT MORPH BLD: NORMAL — SIGNIFICANT CHANGE UP
PLATELET # BLD AUTO: 175 K/UL — SIGNIFICANT CHANGE UP (ref 150–400)
POIKILOCYTOSIS BLD QL AUTO: SLIGHT — SIGNIFICANT CHANGE UP
POTASSIUM SERPL-MCNC: 5.4 MMOL/L — HIGH (ref 3.5–5.3)
POTASSIUM SERPL-SCNC: 5.4 MMOL/L — HIGH (ref 3.5–5.3)
PROT SERPL-MCNC: 7.1 GM/DL — SIGNIFICANT CHANGE UP (ref 6–8.3)
PROTHROM AB SERPL-ACNC: 12.8 SEC — SIGNIFICANT CHANGE UP (ref 9.5–13)
RBC # BLD: 2.43 M/UL — LOW (ref 3.8–5.2)
RBC # FLD: 17.7 % — HIGH (ref 10.3–14.5)
RBC BLD AUTO: ABNORMAL
SODIUM SERPL-SCNC: 137 MMOL/L — SIGNIFICANT CHANGE UP (ref 135–145)
TARGETS BLD QL SMEAR: SLIGHT — SIGNIFICANT CHANGE UP
TROPONIN I, HIGH SENSITIVITY RESULT: 81.5 NG/L — HIGH
WBC # BLD: 7.2 K/UL — SIGNIFICANT CHANGE UP (ref 3.8–10.5)
WBC # FLD AUTO: 7.2 K/UL — SIGNIFICANT CHANGE UP (ref 3.8–10.5)

## 2023-08-09 PROCEDURE — 74176 CT ABD & PELVIS W/O CONTRAST: CPT | Mod: 26,MA

## 2023-08-09 PROCEDURE — 93000 ELECTROCARDIOGRAM COMPLETE: CPT

## 2023-08-09 PROCEDURE — 99215 OFFICE O/P EST HI 40 MIN: CPT | Mod: 25

## 2023-08-09 PROCEDURE — 93010 ELECTROCARDIOGRAM REPORT: CPT

## 2023-08-09 PROCEDURE — 99223 1ST HOSP IP/OBS HIGH 75: CPT

## 2023-08-09 PROCEDURE — 99285 EMERGENCY DEPT VISIT HI MDM: CPT

## 2023-08-09 RX ORDER — COLCHICINE 0.6 MG
1 TABLET ORAL
Refills: 0 | DISCHARGE

## 2023-08-09 RX ORDER — FLUTICASONE FUROATE, UMECLIDINIUM BROMIDE AND VILANTEROL TRIFENATATE 200; 62.5; 25 UG/1; UG/1; UG/1
1 POWDER RESPIRATORY (INHALATION)
Refills: 0 | DISCHARGE

## 2023-08-09 RX ORDER — ALBUTEROL 90 UG/1
2 AEROSOL, METERED ORAL EVERY 6 HOURS
Refills: 0 | Status: DISCONTINUED | OUTPATIENT
Start: 2023-08-09 | End: 2023-08-28

## 2023-08-09 RX ORDER — APIXABAN 2.5 MG/1
1 TABLET, FILM COATED ORAL
Refills: 0 | DISCHARGE

## 2023-08-09 RX ORDER — BUDESONIDE AND FORMOTEROL FUMARATE DIHYDRATE 160; 4.5 UG/1; UG/1
2 AEROSOL RESPIRATORY (INHALATION)
Refills: 0 | Status: DISCONTINUED | OUTPATIENT
Start: 2023-08-09 | End: 2023-08-28

## 2023-08-09 RX ORDER — SODIUM CHLORIDE 9 MG/ML
500 INJECTION INTRAMUSCULAR; INTRAVENOUS; SUBCUTANEOUS ONCE
Refills: 0 | Status: COMPLETED | OUTPATIENT
Start: 2023-08-09 | End: 2023-08-09

## 2023-08-09 RX ORDER — ALBUTEROL 90 UG/1
2 AEROSOL, METERED ORAL
Refills: 0 | DISCHARGE

## 2023-08-09 RX ORDER — FOLIC ACID 0.8 MG
1 TABLET ORAL
Qty: 0 | Refills: 0 | DISCHARGE

## 2023-08-09 RX ORDER — FOLIC ACID 0.8 MG
1 TABLET ORAL DAILY
Refills: 0 | Status: DISCONTINUED | OUTPATIENT
Start: 2023-08-09 | End: 2023-08-28

## 2023-08-09 RX ORDER — ALLOPURINOL 300 MG
1 TABLET ORAL
Qty: 0 | Refills: 0 | DISCHARGE

## 2023-08-09 RX ORDER — FLUTICASONE PROPIONATE 50 MCG
1 SPRAY, SUSPENSION NASAL
Qty: 0 | Refills: 0 | DISCHARGE

## 2023-08-09 RX ORDER — OMEPRAZOLE 10 MG/1
1 CAPSULE, DELAYED RELEASE ORAL
Qty: 0 | Refills: 0 | DISCHARGE

## 2023-08-09 RX ORDER — ATORVASTATIN CALCIUM 80 MG/1
1 TABLET, FILM COATED ORAL
Qty: 0 | Refills: 0 | DISCHARGE

## 2023-08-09 RX ORDER — APIXABAN 2.5 MG/1
1 TABLET, FILM COATED ORAL
Qty: 0 | Refills: 0 | DISCHARGE

## 2023-08-09 RX ORDER — FLUTICASONE FUROATE, UMECLIDINIUM BROMIDE AND VILANTEROL TRIFENATATE 200; 62.5; 25 UG/1; UG/1; UG/1
1 POWDER RESPIRATORY (INHALATION)
Qty: 0 | Refills: 0 | DISCHARGE

## 2023-08-09 RX ORDER — PREGABALIN 225 MG/1
1 CAPSULE ORAL
Qty: 0 | Refills: 0 | DISCHARGE

## 2023-08-09 RX ORDER — PANTOPRAZOLE SODIUM 20 MG/1
40 TABLET, DELAYED RELEASE ORAL EVERY 12 HOURS
Refills: 0 | Status: DISCONTINUED | OUTPATIENT
Start: 2023-08-09 | End: 2023-08-22

## 2023-08-09 RX ORDER — COLCHICINE 0.6 MG
1 TABLET ORAL
Qty: 0 | Refills: 0 | DISCHARGE

## 2023-08-09 RX ORDER — ACETAMINOPHEN 500 MG
650 TABLET ORAL EVERY 6 HOURS
Refills: 0 | Status: DISCONTINUED | OUTPATIENT
Start: 2023-08-09 | End: 2023-08-28

## 2023-08-09 RX ADMIN — SODIUM CHLORIDE 1000 MILLILITER(S): 9 INJECTION INTRAMUSCULAR; INTRAVENOUS; SUBCUTANEOUS at 19:46

## 2023-08-09 RX ADMIN — PANTOPRAZOLE SODIUM 40 MILLIGRAM(S): 20 TABLET, DELAYED RELEASE ORAL at 19:24

## 2023-08-09 RX ADMIN — Medication 650 MILLIGRAM(S): at 23:30

## 2023-08-09 RX ADMIN — Medication 650 MILLIGRAM(S): at 22:54

## 2023-08-09 NOTE — ED PROVIDER NOTE - NSICDXPASTMEDICALHX_GEN_ALL_CORE_FT
PAST MEDICAL HISTORY:  Anemia     Asthma     Essential hypertension     Gout     Gout     History of COPD On 3LNC sometimes at home    HSV (herpes simplex virus) infection     HTN (hypertension)     Hypothyroid     SONU on CPAP     Paroxysmal atrial fibrillation     SOB (shortness of breath) R/T inhalation of chemicals at work ( [employer] )    Stage 3 chronic kidney disease

## 2023-08-09 NOTE — H&P ADULT - ASSESSMENT
68 year old F hx of HTN, gout, hypothyroidism, L. 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    #Melena  -NPOpm  -protonix 40 mg BID  -consult GI in am  -transfuse if Hg <7 or if hypotensive    #VY  -creatinine 3.27 baseline 2.16 possibly from decreased PO intake and swallowing difficulties  -S/S depending on timeline of GI if any possible interventions  -CT a/p and neck noncon  -give 500 CC NS  -potassium 5.4 give fluids    #Chest tightness  -troponin 81, repeat  -ekg nonischemic chest tightness improved  -check d-dimer r/o PE though % on 2 L NC (is on baseline NC)    #SONU  -unsure CPAP settings can do CPAP 10 cm pressure  -check VBG in am     #CHF  -hold spironolactone in VY and hyperkalemia.  -if overloaded can give lasix     F-none  E-replete prn  N-NPOpm  hold eliquis in setting of melena

## 2023-08-09 NOTE — H&P ADULT - HISTORY OF PRESENT ILLNESS
68 year old F hx of HTN, gout, hypothyroidism, L. 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 She has multiple medical complaints. For melena, she takes ibuprofen/advil/motrin, never had endoscopy, last c-scope was wnl several years prior. She also endorses feeling tight in her neck and having swallowing issues. She had hypothyroidism for some time, but is no longer on synthroid. She is drinking liquids currently to stay hydrated. She also endorses chest pressure and tightness and near syncope today. She denies palpitations and states the pain is more dull in nature.     Troponin was 81 in the setting of elevated creatinine. EKG was NSR no acute ST elevation or pathologic Q waves. Qtc wnl, [place] 1st degree AV block.   creatinine 3.27 from baseline 2.16, potassium noted to be 5.6. Hemoglobin 7.6 from baseline around 9s.

## 2023-08-09 NOTE — HEALTH RISK ASSESSMENT
[No] : In the past 12 months have you used drugs other than those required for medical reasons? No [No falls in past year] : Patient reported no falls in the past year [0] : 2) Feeling down, depressed, or hopeless: Not at all (0) [de-identified] : No [de-identified] : No [de-identified] : None [de-identified] : Eating very little, small portions  [de-identified] : No

## 2023-08-09 NOTE — HISTORY OF PRESENT ILLNESS
[FreeTextEntry8] : 69 yo F presenting due to chest pain upon arriving felt like the pain of a burp and it traveled around her body acute on chronic shortness of breath also had some diarrhea lately with some weight loss drinking Pedialyte to reman hydrated

## 2023-08-09 NOTE — H&P ADULT - NSHPPHYSICALEXAM_GEN_ALL_CORE
PHYSICAL EXAM:  GENERAL: NAD, obese appearing female  HEAD:  Atraumatic, Normocephalic  EYES: EOMI, PERRLA, conjunctiva and sclera clear  NECK: Supple, No JVD  CHEST/LUNG: Clear to auscultation bilaterally; No wheeze  HEART: Regular rate and rhythm; No murmurs, rubs, or gallops  ABDOMEN: Soft, epigastric tenderness to palpation. Nondistended; Bowel sounds present  EXTREMITIES:  2+ Peripheral Pulses, No clubbing, cyanosis, or edema  PSYCH: AAOx3  NEUROLOGY: non-focal  SKIN: No rashes or lesions

## 2023-08-09 NOTE — H&P ADULT - NSHPLABSRESULTS_GEN_ALL_CORE
.  LABS:                         7.8    7.20  )-----------( 175      ( 09 Aug 2023 16:06 )             25.9     08-09    137  |  119<H>  |  32<H>  ----------------------------<  89  5.4<H>   |  12<L>  |  3.27<H>    Ca    7.6<L>      09 Aug 2023 16:06    TPro  7.1  /  Alb  3.0<L>  /  TBili  0.2  /  DBili  x   /  AST  18  /  ALT  15  /  AlkPhos  137<H>  08-09    PT/INR - ( 09 Aug 2023 16:06 )   PT: 12.8 sec;   INR: 1.07 ratio         PTT - ( 09 Aug 2023 16:06 )  PTT:28.3 sec  Urinalysis Basic - ( 09 Aug 2023 16:06 )    Color: x / Appearance: x / SG: x / pH: x  Gluc: 89 mg/dL / Ketone: x  / Bili: x / Urobili: x   Blood: x / Protein: x / Nitrite: x   Leuk Esterase: x / RBC: x / WBC x   Sq Epi: x / Non Sq Epi: x / Bacteria: x

## 2023-08-09 NOTE — ED PROVIDER NOTE - OBJECTIVE STATEMENT
68 year old female with h/o HTN, gout, hypothyroidism, L DVT, COPD, stage 3 CKD, paroxysmal a fib, shawna, and anemia presenst 68 year old female with h/o HTN, gout, hypothyroidism, L DVT, COPD, stage 3 CKD, paroxysmal a fib, shawna, and anemia presents today c/o melena x 3-4 days, pt was seen by her PMD in the office today and sent inf for near syncopal episode, reports also having chest pain, generalized weakness and nausea

## 2023-08-09 NOTE — ED ADULT NURSE NOTE - OBJECTIVE STATEMENT
A from the doctor's office for right sided chest pain, generalized weakness, black stools x2-3 days, shortness of breath  hx of copd, anemia

## 2023-08-09 NOTE — PHYSICAL EXAM
[Ill-Appearing] : ill-appearing [Normal Sclera/Conjunctiva] : normal sclera/conjunctiva [Normal Outer Ear/Nose] : the outer ears and nose were normal in appearance [No Respiratory Distress] : no respiratory distress  [No Accessory Muscle Use] : no accessory muscle use [Clear to Auscultation] : lungs were clear to auscultation bilaterally [Normal Rate] : normal rate  [Regular Rhythm] : with a regular rhythm [Normal S1, S2] : normal S1 and S2 [No Edema] : there was no peripheral edema [Alert and Oriented x3] : oriented to person, place, and time [de-identified] : thinner than usual  [de-identified] : anxious affect

## 2023-08-09 NOTE — ED PROVIDER NOTE - CLINICAL SUMMARY MEDICAL DECISION MAKING FREE TEXT BOX
pt w h/o htn, anemia, sob, ckd and a fib (on eliquis) presents today sent in by her pmd for melena x 3-4 days associated with  near syncope, chest pain and weakness, will obtains labs, ct, cardiac monitor, reassess and likely admit for prbc and workup

## 2023-08-09 NOTE — ED ADULT TRIAGE NOTE - CHIEF COMPLAINT QUOTE
BIBA from the doctor's office for right sided chest pain, generalized weakness, black stools x2-3 days, shortness of breath  hx of copd, anemia

## 2023-08-09 NOTE — ED PROVIDER NOTE - NSICDXPASTSURGICALHX_GEN_ALL_CORE_FT
PAST SURGICAL HISTORY:  H/O bariatric surgery     Surgery, elective right hand - 3 fingers repairs

## 2023-08-10 LAB
ALBUMIN SERPL ELPH-MCNC: 2.9 G/DL — LOW (ref 3.3–5)
ALP SERPL-CCNC: 130 U/L — HIGH (ref 40–120)
ALT FLD-CCNC: 16 U/L — SIGNIFICANT CHANGE UP (ref 12–78)
ANION GAP SERPL CALC-SCNC: 7 MMOL/L — SIGNIFICANT CHANGE UP (ref 5–17)
APTT BLD: 25.9 SEC — SIGNIFICANT CHANGE UP (ref 24.5–35.6)
AST SERPL-CCNC: 17 U/L — SIGNIFICANT CHANGE UP (ref 15–37)
BASE EXCESS BLDV CALC-SCNC: -17.6 MMOL/L — LOW (ref -2–3)
BASOPHILS # BLD AUTO: 0.02 K/UL — SIGNIFICANT CHANGE UP (ref 0–0.2)
BASOPHILS NFR BLD AUTO: 0.3 % — SIGNIFICANT CHANGE UP (ref 0–2)
BILIRUB SERPL-MCNC: 0.3 MG/DL — SIGNIFICANT CHANGE UP (ref 0.2–1.2)
BLD GP AB SCN SERPL QL: SIGNIFICANT CHANGE UP
BLOOD GAS COMMENTS, VENOUS: SIGNIFICANT CHANGE UP
BUN SERPL-MCNC: 34 MG/DL — HIGH (ref 7–23)
CALCIUM SERPL-MCNC: 7.6 MG/DL — LOW (ref 8.5–10.1)
CHLORIDE BLDV-SCNC: 113 MMOL/L — HIGH (ref 98–107)
CHLORIDE SERPL-SCNC: 118 MMOL/L — HIGH (ref 96–108)
CO2 BLDV-SCNC: 11 MMOL/L — LOW (ref 22–26)
CO2 SERPL-SCNC: 13 MMOL/L — LOW (ref 22–31)
CREAT SERPL-MCNC: 3.28 MG/DL — HIGH (ref 0.5–1.3)
EGFR: 15 ML/MIN/1.73M2 — LOW
EOSINOPHIL # BLD AUTO: 0.05 K/UL — SIGNIFICANT CHANGE UP (ref 0–0.5)
EOSINOPHIL NFR BLD AUTO: 0.8 % — SIGNIFICANT CHANGE UP (ref 0–6)
GAS PNL BLDV: 134 MMOL/L — LOW (ref 136–145)
GAS PNL BLDV: SIGNIFICANT CHANGE UP
GAS PNL BLDV: SIGNIFICANT CHANGE UP
GLUCOSE BLDV-MCNC: 83 MG/DL — SIGNIFICANT CHANGE UP (ref 65–95)
GLUCOSE SERPL-MCNC: 86 MG/DL — SIGNIFICANT CHANGE UP (ref 70–99)
HCO3 BLDV-SCNC: 10 MMOL/L — LOW (ref 22–28)
HCT VFR BLD CALC: 22.8 % — LOW (ref 34.5–45)
HCT VFR BLDA CALC: 23 % — LOW (ref 37–47)
HGB BLD CALC-MCNC: 7.6 G/DL — LOW (ref 11.7–16.1)
HGB BLD-MCNC: 7.1 G/DL — LOW (ref 11.5–15.5)
HOROWITZ INDEX BLDV+IHG-RTO: 32 — SIGNIFICANT CHANGE UP
IMM GRANULOCYTES NFR BLD AUTO: 0.5 % — SIGNIFICANT CHANGE UP (ref 0–0.9)
INR BLD: 0.92 RATIO — SIGNIFICANT CHANGE UP (ref 0.85–1.18)
LACTATE BLDV-MCNC: 0.6 MMOL/L — SIGNIFICANT CHANGE UP (ref 0.56–1.39)
LYMPHOCYTES # BLD AUTO: 1.51 K/UL — SIGNIFICANT CHANGE UP (ref 1–3.3)
LYMPHOCYTES # BLD AUTO: 24.7 % — SIGNIFICANT CHANGE UP (ref 13–44)
MAGNESIUM SERPL-MCNC: 1.8 MG/DL — SIGNIFICANT CHANGE UP (ref 1.6–2.6)
MCHC RBC-ENTMCNC: 31.1 G/DL — LOW (ref 32–36)
MCHC RBC-ENTMCNC: 32.9 PG — SIGNIFICANT CHANGE UP (ref 27–34)
MCV RBC AUTO: 105.6 FL — HIGH (ref 80–100)
MONOCYTES # BLD AUTO: 0.37 K/UL — SIGNIFICANT CHANGE UP (ref 0–0.9)
MONOCYTES NFR BLD AUTO: 6 % — SIGNIFICANT CHANGE UP (ref 2–14)
NEUTROPHILS # BLD AUTO: 4.14 K/UL — SIGNIFICANT CHANGE UP (ref 1.8–7.4)
NEUTROPHILS NFR BLD AUTO: 67.7 % — SIGNIFICANT CHANGE UP (ref 43–77)
NRBC # BLD: 0 /100 WBCS — SIGNIFICANT CHANGE UP (ref 0–0)
PCO2 BLDV: 29 MMHG — LOW (ref 42–55)
PH BLDV: 7.14 — CRITICAL LOW (ref 7.32–7.43)
PHOSPHATE SERPL-MCNC: 4.4 MG/DL — SIGNIFICANT CHANGE UP (ref 2.5–4.5)
PLATELET # BLD AUTO: 178 K/UL — SIGNIFICANT CHANGE UP (ref 150–400)
PO2 BLDV: 56 MMHG — HIGH (ref 25–45)
POTASSIUM BLDV-SCNC: 4.7 MMOL/L — SIGNIFICANT CHANGE UP (ref 3.5–5.1)
POTASSIUM SERPL-MCNC: 4.6 MMOL/L — SIGNIFICANT CHANGE UP (ref 3.5–5.3)
POTASSIUM SERPL-SCNC: 4.6 MMOL/L — SIGNIFICANT CHANGE UP (ref 3.5–5.3)
PROT SERPL-MCNC: 6.8 GM/DL — SIGNIFICANT CHANGE UP (ref 6–8.3)
PROTHROM AB SERPL-ACNC: 11 SEC — SIGNIFICANT CHANGE UP (ref 9.5–13)
RBC # BLD: 2.16 M/UL — LOW (ref 3.8–5.2)
RBC # FLD: 17.8 % — HIGH (ref 10.3–14.5)
SAO2 % BLDV: 87.1 % — LOW (ref 94–98)
SODIUM SERPL-SCNC: 138 MMOL/L — SIGNIFICANT CHANGE UP (ref 135–145)
WBC # BLD: 6.12 K/UL — SIGNIFICANT CHANGE UP (ref 3.8–10.5)
WBC # FLD AUTO: 6.12 K/UL — SIGNIFICANT CHANGE UP (ref 3.8–10.5)

## 2023-08-10 PROCEDURE — 70490 CT SOFT TISSUE NECK W/O DYE: CPT | Mod: 26

## 2023-08-10 PROCEDURE — 99222 1ST HOSP IP/OBS MODERATE 55: CPT

## 2023-08-10 PROCEDURE — 99223 1ST HOSP IP/OBS HIGH 75: CPT

## 2023-08-10 PROCEDURE — 99232 SBSQ HOSP IP/OBS MODERATE 35: CPT

## 2023-08-10 RX ADMIN — Medication 1 MILLIGRAM(S): at 11:33

## 2023-08-10 RX ADMIN — PANTOPRAZOLE SODIUM 40 MILLIGRAM(S): 20 TABLET, DELAYED RELEASE ORAL at 17:16

## 2023-08-10 RX ADMIN — PANTOPRAZOLE SODIUM 40 MILLIGRAM(S): 20 TABLET, DELAYED RELEASE ORAL at 05:56

## 2023-08-10 NOTE — CONSULT NOTE ADULT - ASSESSMENT
68F HTN, gout, hypothyroidism, L. DVT, atrial fibrillation, COPD on home O2 (from exposure [place] ), SONU on CPAP, obesity s/p gastric sleevechronic anemia here with melena, acute on chronic anemia. Seen by GI with plans for endoscopy. Requesting pulmonary clearance    DX: GI bleed, anemia due to blood loss, melena, COPD with oxygen dependence chronic hypoxic respiratory failure, SONU on CPAP, obesity    - cont bronchodilators for underlying COPD  - cont nocturnal CPAP for sleep apnea  - currently at baseline O2 requirement of 3L NC  - maintain O2 sat in the low 90s  - lung exam clear  - she had to be reintubated post gastric sleeve secondary to hypoxia and was successfully extubated the following day  - review of VBG sent today with pH 7.1, CO2: 29, Po2: 56, HCO3: 10  - she has by VBG underlying severe metabolic acidosis with compensatory respiratory alkalosis  - will do an ABG to assess acid base status  - if she remains severely acidotic salima need that corrected prior to proceeding with procedure  - she does have some SOB which may be multifactorial: underlying COPD, acute on chronic anemia, however likely also with some SOB from respiratory compensation of metabolic acidosis if her blood gas is accurate  - spoke to respiratory therapist for ABG to be done 68F HTN, gout, hypothyroidism, L. DVT, atrial fibrillation, COPD on home O2 (from exposure [place] ), SONU on CPAP, obesity s/p gastric sleeve, chronic anemia, CKD here with melena, acute on chronic anemia. Seen by GI with plans for endoscopy. Requesting pulmonary clearance    DX: GI bleed, anemia due to blood loss, melena, COPD with oxygen dependence chronic hypoxic respiratory failure, SONU on CPAP, obesity, VY on CKD, metabolic acidosis, respiratory alkalosis    - cont bronchodilators for underlying COPD  - cont nocturnal CPAP for sleep apnea  - currently at baseline O2 requirement of 3L NC  - maintain O2 sat in the low 90s  - lung exam clear  - she reports hx of having to be reintubated post gastric sleeve secondary to hypoxia and was successfully extubated the following day  - review of VBG sent today with pH 7.1, CO2: 29, Po2: 56, HCO3: 10  - she has by VBG underlying severe metabolic acidosis with compensatory respiratory alkalosis  - will do an ABG to assess acid base status  - if she remains severely acidotic will need that corrected prior to proceeding with procedure  - she does have some SOB which may be multifactorial: underlying COPD, acute on chronic anemia, however likely also with some SOB from respiratory compensation of metabolic acidosis if her blood gas is accurate  - spoke to respiratory therapist for ABG to be done

## 2023-08-10 NOTE — PATIENT PROFILE ADULT - FALL HARM RISK - HARM RISK INTERVENTIONS

## 2023-08-10 NOTE — CONSULT NOTE ADULT - SUBJECTIVE AND OBJECTIVE BOX
Cardiology Initial Consult    [facility] [place]  [address] [place] [address], [place] [address]  Office: [phone]  Fax: [phone]    CHIEF COMPLAINT:      HISTORY OF PRESENT ILLNESS:  68F HTN, hypothyroidism, Afib on Eliquis, COPD on O2, SONU who presented with several days of melena and LOMAX. She also has been experiencing chest tightness and dizziness.    Denies any LE edema, palpitations, or orthopnea.      Allergies  No Known Allergies  Intolerances    	    MEDICATIONS:  albuterol    90 MICROgram(s) HFA Inhaler 2 Puff(s) Inhalation every 6 hours PRN  budesonide 160 MICROgram(s)/formoterol 4.5 MICROgram(s) Inhaler 2 Puff(s) Inhalation two times a day  acetaminophen     Tablet .. 650 milliGRAM(s) Oral every 6 hours PRN  pantoprazole  Injectable 40 milliGRAM(s) IV Push every 12 hours  folic acid 1 milliGRAM(s) Oral daily      PAST MEDICAL & SURGICAL HISTORY:  HTN (hypertension)  Gout  Hypothyroid  Asthma  HSV (herpes simplex virus) infection  SOB (shortness of breath)  R/T inhalation of chemicals at work ( [employer] )  History of COPD  On 3LNC sometimes at home  Paroxysmal atrial fibrillation  Essential hypertension  Gout  SONU on CPAP  Stage 3 chronic kidney disease  Anemia  Surgery, elective  right hand - 3 fingers repairs  H/O bariatric surgery    FAMILY HISTORY:  No pertinent family history in first degree relatives    SOCIAL HISTORY:    [x] Non-smoker  [ ] Smoker  [ ] Alcohol    Review of Systems:  Constitutional: [- ] Fever [- ] Chills [x ] Fatigue [ ] Weight change   HEENT: [ ] Blurred vision [ ] Eye pain [- ] Headache [ ] Runny nose [ ] Sore throat   Respiratory: [- ] Cough [ ] Wheezing [ ] Shortness of breath  Cardiovascular: [- ] Chest Pain [ -] Palpitations [x ] LOMAX [ ] PND [- ] Orthopnea  Gastrointestinal: [- ] Abdominal Pain [ ] Diarrhea [ ] Constipation [ ] Hemorrhoids [ ] Nausea [ ] Vomiting  Genitourinary: [ ] Nocturia [ -] Dysuria [ ] Incontinence  Extremities: [- ] Swelling [ ] Joint Pain  Neurologic: [ ] Focal deficit [ ] Paresthesias [ ~] Syncope  Skin: [ -] Rash [ ] Ecchymoses [ ] Wounds [ ] Lesions  Psychiatry: [- ] Depression [ ] Suicidal/Homicidal ideation [ ] Anxiety [ ] Sleep disturbances  [x ] 10 point review of systems is otherwise negative except as mentioned above            [ ]Unable to obtain    PHYSICAL EXAM:  T(C): 36.4 (08-10-23 @ 16:50), Max: 37 (08-10-23 @ 05:05)  HR: 67 (08-10-23 @ 17:03) (57 - 92)  BP: 131/75 (08-10-23 @ 16:50) (114/76 - 143/54)  RR: 18 (08-10-23 @ 16:50) (16 - 18)  SpO2: 100% (08-10-23 @ 17:03) (96% - 100%)  Wt(kg): --  I&O's Summary    Appearance: No acute distress  HEENT:   mmm  Cardiovascular: Normal S1 S2, no elevated JVP,no edema  Respiratory: Lungs clear to auscultation, good air movement  Psychiatry: A & O x 3, Mood & affect appropriate  Gastrointestinal:  soft nt  Skin: No rashes, no ecchymoses, no cyanosis	  Neurologic: grossly non-focal  Extremities: Normal range of motion, no clubbing, cyanosis or edema  Vascular: Peripheral pulses palpable bilaterally    LABS:	 	  CBC Full  -  ( 10 Aug 2023 10:15 )  WBC Count : 6.12 K/uL  Hemoglobin : 7.1 g/dL  Hematocrit : 22.8 %  Platelet Count - Automated : 178 K/uL    08-10  138  |  118<H>  |  34<H>  ----------------------------<  86  4.6   |  13<L>  |  3.28<H>    08-09  137  |  119<H>  |  32<H>  ----------------------------<  89  5.4<H>   |  12<L>  |  3.27<H>    Ca    7.6<L>      10 Aug 2023 10:15  Ca    7.6<L>      09 Aug 2023 16:06  Phos  4.4     08-10  Mg     1.8     08-10    TPro  6.8  /  Alb  2.9<L>  /  TBili  0.3  /  DBili  x   /  AST  17  /  ALT  16  /  AlkPhos  130<H>  08-10  TPro  7.1  /  Alb  3.0<L>  /  TBili  0.2  /  DBili  x   /  AST  18  /  ALT  15  /  AlkPhos  137<H>  08-09    proBNP:   Lipid Profile:   HgA1c:   TSH:     CARDIAC MARKERS:  Troponin I, High Sensitivity Result: 81.5 ng/L (08-09-23 @ 16:06)    TELEMETRY: 	    ECG:  	  RADIOLOGY:  OTHER: 	    PREVIOUS DIAGNOSTIC TESTING:    [x] Echocardiogram: < from: TTE with Doppler (w/Cont) (05.31.22 @ 08:57) >  Conclusions:  Technically difficult study.  1. Normal mitral valve. Minimal mitral regurgitation.  2. Aortic valve not well visualized; appears to be a  calcified trileaflet valve with normal opening. Grossly  moderate aortic regurgitation.  3. Endocardial visualization enhanced with intravenous  injection of Ultrasonic Enhancing Agent (Definity). Left  ventricle suboptimally visualized despite intravenous  ultrasonic enhancing agent; grossly normal left ventricular  systolic function. Unable to completely exclude segmental  wall motion abnormalities based on technically difficult  images.  4. The right ventricle is not well visualized; grossly  normal right ventricular systolic function.    < end of copied text >    [x] Catheterization:< from: Cardiac Catheterization (05.27.22 @ 15:06) >  Coronary Angiography   The coronary circulation is right dominant.      LM   Left main artery: Angiography shows no disease.      LAD   Left anterior descending artery: Angiography shows no disease.      CX   Circumflex: Angiography shows no disease.      RCA   Right coronary artery: Angiography shows no disease.      < end of copied text >    [ ] Stress Test:

## 2023-08-10 NOTE — CONSULT NOTE ADULT - ASSESSMENT
69yo F with PMH of HTN, gout, hypothyroidism, L. DVT (on apixaban), atrial fibrillation, COPD, SONU on CPAP, s/p sleeve gastrectomy who presents with melena.     # Melena - 2 days of melena in the setting of ongoing heartburn s/p sleeve gastrectomy and AC use. DDX includes esophagitis vs. PUD vs gastritis vs. malignancy. Plan for EGD when medically optimized.  # LOMAX/ chest pressure - noted recent TTE with no underlying systolic dysfunction.  # COPD - with some worsening SOB  # VY on CKD    Recommendations:  - PPI 40mg BID  - trend CBC, CMP, INR  - 2 large bore IVs; active type and screen  - transfuse Hgb > 8, Platelets > 50  - plan for upper endoscopy when medically optimized  - Please consult cardiology and pulmonology for clearance prior to performing an EGD  - Clear liquid diet  - NPO past midnight for tentative procedure tomorrow pending clearance as above  - supportive care as per primary team    Thank you for involving us in the care of this patient. Please reach out if any further questions.    Chaparro Xie  Gastroenterology    Available on [employer] Teams  [phone]   67yo F with PMH of HTN, gout, hypothyroidism, L. DVT (on apixaban), atrial fibrillation, COPD, SONU on CPAP, s/p sleeve gastrectomy who presents with melena.     # Melena - 2 days of melena in the setting of ongoing heartburn s/p sleeve gastrectomy and AC use. DDX includes esophagitis vs. PUD vs gastritis vs. malignancy. Plan for EGD when medically optimized.  # LOMAX/ chest pressure - noted recent TTE with no underlying systolic dysfunction.  # COPD - with some worsening SOB  # VY on CKD    Recommendations:  - PPI 40mg BID  - trend CBC, CMP, INR  - 2 large bore IVs; active type and screen  - transfuse Hgb > 8, Platelets > 50  - plan for upper endoscopy when medically optimized  - Hold AC if risk acceptable  - Please consult cardiology and pulmonology for clearance prior to performing an EGD  - Clear liquid diet  - NPO past midnight for tentative procedure tomorrow pending clearance as above  - supportive care as per primary team    Thank you for involving us in the care of this patient. Please reach out if any further questions.    Chaparro Xie  Gastroenterology    Available on [employer] Teams  [phone]

## 2023-08-10 NOTE — CONSULT NOTE ADULT - SUBJECTIVE AND OBJECTIVE BOX
Chief Complaint:  Patient is a 68y old  Female who presents with a chief complaint of GI bleeding (09 Aug 2023 18:49)      HPI:  Ms. Henry is a 69yo F with PMH of HTN, gout, hypothyroidism, L. DVT (on apixaban), atrial fibrillation, COPD, SONU on CPAP, s/p sleeve gastrectomy who presents with melena.     Patient reports 2 days of melena that resolved 2 days prior to presentation. No similar previous episodes. Endorses significant heartburn daily and many months of epigastric discomfort following meals. Not on PPI. Not takings NSAIDS. No recent EGD. Reports normal colonoscopy ~5y prior to admission. Endorses some sensation of food getting stuck occasionally with solids only. No odynophagia. Endorses subjective weight loss. No diarrhea or constipation.     In addition, patient endorses some pressure like chest pain, worse with movement and a near-syncope event the day prior to presentation. Endorsees significant LOMAX at baseline.     In the ED: VSS, creatinine 3.27 from baseline 2.16, potassium noted to be 5.6. Hemoglobin 7.6 from baseline around 9s.     Allergies:  No Known Allergies      Home Medications:  colchicine 0.6 mg oral capsule: 1 orally once a day (09 Aug 2023 18:41)  Eliquis 5 mg oral tablet: 1 orally 2 times a day (09 Aug 2023 18:40)  folic acid 1 mg daily:  (09 Aug 2023 18:41)  ProAir HFA 90 mcg/inh inhalation aerosol: 2 puff(s) inhaled every 6 hours as needed for shortness of breath or cough (09 Aug 2023 18:41)  spironolactone 50 mg oral tablet: 1 tab(s) orally once a day (09 Aug 2023 18:41)  Trelegy Ellipta 100 mcg-62.5 mcg-25 mcg/inh inhalation powder: 1 puff(s) inhaled once a day (09 Aug 2023 18:41)    Hospital Medications:  acetaminophen     Tablet .. 650 milliGRAM(s) Oral every 6 hours PRN  albuterol    90 MICROgram(s) HFA Inhaler 2 Puff(s) Inhalation every 6 hours PRN  budesonide 160 MICROgram(s)/formoterol 4.5 MICROgram(s) Inhaler 2 Puff(s) Inhalation two times a day  folic acid 1 milliGRAM(s) Oral daily  pantoprazole  Injectable 40 milliGRAM(s) IV Push every 12 hours      PMHX/PSHX:  HTN (hypertension)    Gout    Hypothyroid    Asthma    HSV (herpes simplex virus) infection    SOB (shortness of breath)    History of COPD    Paroxysmal atrial fibrillation    Essential hypertension    Gout    SONU on CPAP    Stage 3 chronic kidney disease    Anemia    Surgery, elective    H/O bariatric surgery        Family history:  No pertinent family history in first degree relatives        Denies family history of colon cancer/polyps, stomach cancer/polyps, pancreatic cancer/masses, liver cancer/disease, ovarian cancer and endometrial cancer.    Social History:     Tob: Denies  EtOH: As above  Illicit Drugs: Denies    ROS:   General:  No wt loss, fevers, chills, night sweats, fatigue  Eyes:  Good vision, no reported pain  ENT:  No sore throat, pain, runny nose, dysphagia  CV:  No pain, palpitations, hypo/hypertension  Pulm:  No dyspnea, cough, tachypnea, wheezing  GI:  As per HPI  :  No pain, bleeding, incontinence, nocturia  Muscle:  No pain, weakness  Neuro:  No weakness, tingling, memory problems  Psych:  No fatigue, insomnia, mood problems, depression  Endocrine:  No polyuria, polydipsia, cold/heat intolerance  Heme:  No petechiae, ecchymosis, easy bruisability  Skin:  No rash, tattoos, scars, edema    PHYSICAL EXAM:   GENERAL:  No acute distress, on NC  HEENT:  Normocephalic/atraumatic, no scleral icterus  CHEST:  No accessory muscle use  HEART:  Regular rate and rhythm  ABDOMEN:  Soft, non-tender, non-distended  EXTREMITIES: No cyanosis, clubbing, or edema  SKIN:  No rash  NEURO:  Alert and oriented x 3, no asterixis    Vital Signs:  Vital Signs Last 24 Hrs  T(C): 36.6 (10 Aug 2023 11:00), Max: 37 (10 Aug 2023 05:05)  T(F): 97.9 (10 Aug 2023 11:00), Max: 98.6 (10 Aug 2023 05:05)  HR: 57 (10 Aug 2023 11:00) (57 - 92)  BP: 114/76 (10 Aug 2023 11:00) (102/56 - 143/54)  BP(mean): --  RR: 18 (10 Aug 2023 11:00) (16 - 18)  SpO2: 96% (10 Aug 2023 11:00) (96% - 100%)    Parameters below as of 10 Aug 2023 11:00  Patient On (Oxygen Delivery Method): room air      Daily     Daily Weight in k (09 Aug 2023 21:15)    LABS:                        7.1    6.12  )-----------( 178      ( 10 Aug 2023 10:15 )             22.8     Mean Cell Volume: 105.6 fl (08-10-23 @ 10:15)    08-10    138  |  118<H>  |  34<H>  ----------------------------<  86  4.6   |  13<L>  |  3.28<H>    Ca    7.6<L>      10 Aug 2023 10:15  Phos  4.4     08-10  Mg     1.8     08-10    TPro  6.8  /  Alb  2.9<L>  /  TBili  0.3  /  DBili  x   /  AST  17  /  ALT  16  /  AlkPhos  130<H>  08-10    LIVER FUNCTIONS - ( 10 Aug 2023 10:15 )  Alb: 2.9 g/dL / Pro: 6.8 gm/dL / ALK PHOS: 130 U/L / ALT: 16 U/L / AST: 17 U/L / GGT: x           PT/INR - ( 10 Aug 2023 10:15 )   PT: 11.0 sec;   INR: 0.92 ratio         PTT - ( 10 Aug 2023 10:15 )  PTT:25.9 sec  Urinalysis Basic - ( 10 Aug 2023 10:15 )    Color: x / Appearance: x / SG: x / pH: x  Gluc: 86 mg/dL / Ketone: x  / Bili: x / Urobili: x   Blood: x / Protein: x / Nitrite: x   Leuk Esterase: x / RBC: x / WBC x   Sq Epi: x / Non Sq Epi: x / Bacteria: x                              7.1    6.12  )-----------( 178      ( 10 Aug 2023 10:15 )             22.8                         7.8    7.20  )-----------( 175      ( 09 Aug 2023 16:06 )             25.9       Imaging:  < from: CT Abdomen and Pelvis No Cont (23 @ 18:31) >    ACC: [id number] EXAM:  CT ABDOMEN AND PELVIS   ORDERED BY: RICHARD MCCOYIS     PROCEDURE DATE:  2023          INTERPRETATION:  CLINICAL INFORMATION: Rectal bleeding.    COMPARISON: CT chest 2022.    CONTRAST/COMPLICATIONS:  IV Contrast: NONE  Oral Contrast: NONE  Complications: None reported at time of study completion    PROCEDURE:  CT of the Abdomen and Pelvis was performed.  Sagittal and coronal reformats were performed.    FINDINGS:  LOWER CHEST: Within normal limits.    LIVER: Within normal limits.  BILE DUCTS: Normal caliber.  GALLBLADDER: Cholelithiasis without evidence of cholecystitis.  SPLEEN: Within normal limits.  PANCREAS: Within normal limits.  ADRENALS: Within normal limits.  KIDNEYS/URETERS: Within normal limits.    BLADDER: Within normal limits.  REPRODUCTIVE ORGANS: IUD in position. No adnexal lesion.    BOWEL: Sleeve gastrectomy. No bowel obstruction. Few colonic diverticula   without evidence of diverticulitis. Appendix is not visualized. No   evidence of inflammation in the pericecal region.  PERITONEUM: No ascites.  VESSELS: Atherosclerotic changes.  RETROPERITONEUM/LYMPH NODES: No lymphadenopathy.  ABDOMINAL WALL: Within normal limits.  BONES: Degenerative changes.    IMPRESSION:  No bowel obstruction or inflammation.  Few colonic diverticula without diverticulitis. No abnormal mass along   the GI tract.    Cholelithiasis without evidence of cholecystitis.        --- End of Report ---            JOANNE ALFARO MD; Attending Radiologist  This document has been electronically signed. Aug  9 2023  6:54PM    < end of copied text >

## 2023-08-10 NOTE — CONSULT NOTE ADULT - SUBJECTIVE AND OBJECTIVE BOX
Patient is a 68y old  Female who presents with a chief complaint of GI bleeding (10 Aug 2023 17:59)      HPI:  68 year old F hx of HTN, gout, hypothyroidism, L. 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 She has multiple medical complaints. For melena, she takes ibuprofen/advil/motrin, never had endoscopy, last c-scope was wnl several years prior. She also endorses feeling tight in her neck and having swallowing issues. She had hypothyroidism for some time, but is no longer on synthroid. She is drinking liquids currently to stay hydrated. She also endorses chest pressure and tightness and near syncope today. She denies palpitations and states the pain is more dull in nature.     Troponin was 81 in the setting of elevated creatinine. EKG was NSR no acute ST elevation or pathologic Q waves. Qtc wnl, SC 1st degree AV block.   creatinine 3.27 from baseline 2.16, potassium noted to be 5.6. Hemoglobin 7.6 from baseline around 9s.   (09 Aug 2023 18:49)      Allergies  No Known Allergies      MEDICATIONS  (STANDING):  budesonide 160 MICROgram(s)/formoterol 4.5 MICROgram(s) Inhaler 2 Puff(s) Inhalation two times a day  folic acid 1 milliGRAM(s) Oral daily  pantoprazole  Injectable 40 milliGRAM(s) IV Push every 12 hours    MEDICATIONS  (PRN):  acetaminophen     Tablet .. 650 milliGRAM(s) Oral every 6 hours PRN Temp greater or equal to 38C (100.4F), Mild Pain (1 - 3)  albuterol    90 MICROgram(s) HFA Inhaler 2 Puff(s) Inhalation every 6 hours PRN Shortness of Breath and/or Wheezing      Drug Dosing Weight  Height (cm): 172.7 (09 Aug 2023 12:25)  Weight (kg): 107 (09 Aug 2023 21:15)  BMI (kg/m2): 35.9 (09 Aug 2023 21:15)  BSA (m2): 2.19 (09 Aug 2023 21:15)    PAST MEDICAL & SURGICAL HISTORY:  HTN (hypertension)      Gout      Hypothyroid      Asthma      HSV (herpes simplex virus) infection      SOB (shortness of breath)  R/T inhalation of chemicals at work ( [employer] )      History of COPD  On 3LNC sometimes at home      Paroxysmal atrial fibrillation      Essential hypertension      Gout      SONU on CPAP      Stage 3 chronic kidney disease      Anemia      Surgery, elective  right hand - 3 fingers repairs      H/O bariatric surgery - gastric sleeve 2018          FAMILY HISTORY:  No pertinent family history in first degree relatives        SOCIAL HISTORY:      REVIEW OF SYSTEMS:  CONSTITUTIONAL: No fever, weight loss, or fatigue  EYES: No eye pain, visual disturbances, or discharge  ENMT:  No difficulty hearing, tinnitus, vertigo; No sinus or throat pain  NECK: No pain or stiffness  BREASTS: No pain, masses, or nipple discharge  RESPIRATORY: No cough, wheezing, chills or hemoptysis; No shortness of breath  CARDIOVASCULAR: No chest pain, palpitations, dizziness, or leg swelling  GASTROINTESTINAL: No abdominal or epigastric pain. No nausea, vomiting, or hematemesis; No diarrhea or constipation. No melena or hematochezia.  GENITOURINARY: No dysuria, frequency, hematuria, or incontinence  NEUROLOGICAL: No headaches, memory loss, loss of strength, numbness, or tremors  SKIN: No itching, burning, rashes, or lesions   LYMPH NODES: No enlarged glands  ENDOCRINE: No heat or cold intolerance; No hair loss  MUSCULOSKELETAL: No joint pain or swelling; No muscle, back, or extremity pain  PSYCHIATRIC: No depression, anxiety, mood swings, or difficulty sleeping  HEME/LYMPH: No easy bruising, or bleeding gums  ALLERGY AND IMMUNOLOGIC: No hives or eczema          Vital Signs Last 24 Hrs  T(C): 36.4 (10 Aug 2023 21:41), Max: 37 (10 Aug 2023 05:05)  T(F): 97.5 (10 Aug 2023 21:41), Max: 98.6 (10 Aug 2023 05:05)  HR: 80 (10 Aug 2023 21:41) (57 - 92)  BP: 134/70 (10 Aug 2023 21:41) (114/76 - 155/85)  RR: 18 (10 Aug 2023 21:41) (17 - 18)  SpO2: 100% (10 Aug 2023 21:41) (96% - 100%)    O2 Parameters below as of 10 Aug 2023 21:41  Patient On (Oxygen Delivery Method): nasal cannula  O2 Flow (L/min): 3        PHYSICAL EXAM:  GENERAL: NAD, well-groomed, well-developed  HEAD:  Atraumatic, Normocephalic  EYES: EOMI, PERRLA, conjunctiva and sclera clear  ENMT: No tonsillar erythema, exudates, or enlargement; Moist mucous membranes, Good dentition, No lesions  NECK: Supple, No JVD, Normal thyroid  NERVOUS SYSTEM:  Alert & Oriented X3, Good concentration; Motor Strength 5/5 B/L upper and lower extremities; DTRs 2+ intact and symmetric  CHEST/LUNG: Clear to percussion bilaterally; No rales, rhonchi, wheezing, or rubs  HEART: Regular rate and rhythm; No murmurs, rubs, or gallops  ABDOMEN: Soft, Nontender, Nondistended; Bowel sounds present  EXTREMITIES:  2+ Peripheral Pulses, No clubbing, cyanosis, or edema  LYMPH: No lymphadenopathy noted  SKIN: No rashes or lesions      LABS:                        7.1    6.12  )-----------( 178      ( 10 Aug 2023 10:15 )             22.8       08-10    138  |  118<H>  |  34<H>  ----------------------------<  86  4.6   |  13<L>  |  3.28<H>    Ca    7.6<L>      10 Aug 2023 10:15  Phos  4.4     08-10  Mg     1.8     08-10    TPro  6.8  /  Alb  2.9<L>  /  TBili  0.3  /  DBili  x   /  AST  17  /  ALT  16  /  AlkPhos  130<H>  08-10    CAPILLARY BLOOD GLUCOSE        PT/INR - ( 10 Aug 2023 10:15 )   PT: 11.0 sec;   INR: 0.92 ratio    PTT - ( 10 Aug 2023 10:15 )  PTT:25.9 sec      Urinalysis Basic - ( 10 Aug 2023 10:15 )    Color: x / Appearance: x / SG: x / pH: x  Gluc: 86 mg/dL / Ketone: x  / Bili: x / Urobili: x   Blood: x / Protein: x / Nitrite: x   Leuk Esterase: x / RBC: x / WBC x   Sq Epi: x / Non Sq Epi: x / Bacteria: x              EKG:    ECHO, US:    RADIOLOGY:    CRITICAL CARE TIME SPENT:   Patient is a 68y old  Female who presents with a chief complaint of GI bleeding (10 Aug 2023 17:59)      HPI:  68 year old F hx of HTN, gout, hypothyroidism, L. 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 She has multiple medical complaints. For melena, she takes ibuprofen/advil/motrin, never had endoscopy, last c-scope was wnl several years prior. She also endorses feeling tight in her neck and having swallowing issues. She had hypothyroidism for some time, but is no longer on synthroid. She is drinking liquids currently to stay hydrated. She also endorses chest pressure and tightness and near syncope today. She denies palpitations and states the pain is more dull in nature.     Troponin was 81 in the setting of elevated creatinine. EKG was NSR no acute ST elevation or pathologic Q waves. Qtc wnl, AR 1st degree AV block.   creatinine 3.27 from baseline 2.16, potassium noted to be 5.6. Hemoglobin 7.6 from baseline around 9s.   (09 Aug 2023 18:49)    -----------------------------  Admitted with GI bleed  Asked for pulmonary clearance for planned EGD      Allergies  No Known Allergies      MEDICATIONS  (STANDING):  budesonide 160 MICROgram(s)/formoterol 4.5 MICROgram(s) Inhaler 2 Puff(s) Inhalation two times a day  folic acid 1 milliGRAM(s) Oral daily  pantoprazole  Injectable 40 milliGRAM(s) IV Push every 12 hours    MEDICATIONS  (PRN):  acetaminophen     Tablet .. 650 milliGRAM(s) Oral every 6 hours PRN Temp greater or equal to 38C (100.4F), Mild Pain (1 - 3)  albuterol    90 MICROgram(s) HFA Inhaler 2 Puff(s) Inhalation every 6 hours PRN Shortness of Breath and/or Wheezing      Drug Dosing Weight  Height (cm): 172.7 (09 Aug 2023 12:25)  Weight (kg): 107 (09 Aug 2023 21:15)  BMI (kg/m2): 35.9 (09 Aug 2023 21:15)  BSA (m2): 2.19 (09 Aug 2023 21:15)    PAST MEDICAL & SURGICAL HISTORY:  HTN (hypertension)      Gout      Hypothyroid      Asthma      HSV (herpes simplex virus) infection      SOB (shortness of breath)  R/T inhalation of chemicals at work ( [employer] )      History of COPD  On 3LNC sometimes at home      Paroxysmal atrial fibrillation      Essential hypertension      Gout      SONU on CPAP      Stage 3 chronic kidney disease      Anemia      Surgery, elective  right hand - 3 fingers repairs      H/O bariatric surgery - gastric sleeve 2018          FAMILY HISTORY:  No pertinent family history in first degree relatives        SOCIAL HISTORY:    non smoker  no ETOH      REVIEW OF SYSTEMS:  CONSTITUTIONAL: No fever, no chills, no weight loss, or fatigue  EYES: No eye pain, visual disturbances, or discharge  ENMT:  No difficulty hearing, tinnitus, vertigo; No sinus or throat pain  NECK: No pain or stiffness  BREASTS: No pain, masses, or nipple discharge  RESPIRATORY: No cough, wheezing, or hemoptysis; No shortness of breath  CARDIOVASCULAR: No chest pain, palpitations, dizziness, or leg swelling  GASTROINTESTINAL: No abdominal or epigastric pain. No nausea, vomiting, or hematemesis; + melena  GENITOURINARY: No dysuria, hematuria, or incontinence  NEUROLOGICAL: No headaches, memory loss, loss of strength, numbness, or tremors, wheelchair use at [facility]  SKIN: No itching, burning, rashes, or lesions   LYMPH NODES: No enlarged glands  ENDOCRINE: No heat or cold intolerance; No hair loss  MUSCULOSKELETAL: No joint pain or swelling; No muscle, back, or extremity pain, hx of gout  PSYCHIATRIC: No depression, anxiety, mood swings, or difficulty sleeping  HEME/LYMPH: No easy bruising, or bleeding gums  ALLERGY AND IMMUNOLOGIC: No hives or eczema          Vital Signs Last 24 Hrs  T(C): 36.4 (10 Aug 2023 21:41), Max: 37 (10 Aug 2023 05:05)  T(F): 97.5 (10 Aug 2023 21:41), Max: 98.6 (10 Aug 2023 05:05)  HR: 80 (10 Aug 2023 21:41) (57 - 92)  BP: 134/70 (10 Aug 2023 21:41) (114/76 - 155/85)  RR: 18 (10 Aug 2023 21:41) (17 - 18)  SpO2: 100% (10 Aug 2023 21:41) (96% - 100%)    O2 Parameters below as of 10 Aug 2023 21:41  Patient On (Oxygen Delivery Method): nasal cannula  O2 Flow (L/min): 3        PHYSICAL EXAM:  GENERAL: obese female, NAD, comfortable in bed  HEAD:  Atraumatic, Normocephalic  EYES: EOMI, conjunctiva and sclera clear  ENMT: No tonsillar erythema, exudates, or enlargement; Moist mucous membranes, No lesions  NECK: Supple, No JVD  NERVOUS SYSTEM:  Alert & Oriented X3, Good concentration, moves all extremities  CHEST/LUNG: Clear to auscultation bilaterally; No rales, rhonchi, wheezing, or rubs  HEART: Regular rate   ABDOMEN: obese abdomen, Soft, Nontender, Nondistended; Bowel sounds present  EXTREMITIES:  2+ Peripheral Pulses, No clubbing, cyanosis, or edema  SKIN: No rashes or lesions      LABS:                        7.1    6.12  )-----------( 178      ( 10 Aug 2023 10:15 )             22.8       08-10    138  |  118<H>  |  34<H>  ----------------------------<  86  4.6   |  13<L>  |  3.28<H>    Ca    7.6<L>      10 Aug 2023 10:15  Phos  4.4     08-10  Mg     1.8     08-10    TPro  6.8  /  Alb  2.9<L>  /  TBili  0.3  /  DBili  x   /  AST  17  /  ALT  16  /  AlkPhos  130<H>  08-10        PT/INR - ( 10 Aug 2023 10:15 )   PT: 11.0 sec;   INR: 0.92 ratio    PTT - ( 10 Aug 2023 10:15 )  PTT:25.9 sec      Urinalysis Basic - ( 10 Aug 2023 10:15 )    Color: x / Appearance: x / SG: x / pH: x  Gluc: 86 mg/dL / Ketone: x  / Bili: x / Urobili: x   Blood: x / Protein: x / Nitrite: x   Leuk Esterase: x / RBC: x / WBC x   Sq Epi: x / Non Sq Epi: x / Bacteria: x            ECHO - pending        RADIOLOGY:  CT < from: CT Abdomen and Pelvis No Cont (08.09.23 @ 18:31) >  LOWER CHEST: Within normal limits.    LIVER: Within normal limits.  BILE DUCTS: Normal caliber.  GALLBLADDER: Cholelithiasis without evidence of cholecystitis.  SPLEEN: Within normal limits.  PANCREAS: Within normal limits.  ADRENALS: Within normal limits.  KIDNEYS/URETERS: Within normal limits.    BLADDER: Within normal limits.  REPRODUCTIVE ORGANS: IUD in position. No adnexal lesion.    BOWEL: Sleeve gastrectomy. No bowel obstruction. Few colonic diverticula   without evidence of diverticulitis. Appendix is not visualized. No   evidence of inflammation in the pericecal region.  PERITONEUM: No ascites.  VESSELS: Atherosclerotic changes.  RETROPERITONEUM/LYMPH NODES: No lymphadenopathy.  ABDOMINAL WALL: Within normal limits.  BONES: Degenerative changes.    IMPRESSION:  No bowel obstruction or inflammation.  Few colonic diverticula without diverticulitis. No abnormal mass along   the GI tract.    Cholelithiasis without evidence of cholecystitis.

## 2023-08-10 NOTE — CONSULT NOTE ADULT - ASSESSMENT
68F HTN, hypothyroidism, Afib on Eliquis, COPD on O2, SONU who presented with several days of melena and LOMAX with acute on chronic anemia.    Pt has chest pressure and LOMAX, likely due to anemia vs. intra-abdominal process.  Troponin minimally elevated in the setting of elevated SCr.  Recent cardiac catheterization with no evidence of CAD/    Pt is without evidence of active cardiac ischemia, uncontrolled arrhythmia, or decompensated heart failure.  No cardiac contraindication to proceeding with endoscopic evaluation.

## 2023-08-10 NOTE — PROGRESS NOTE ADULT - ASSESSMENT
68 year old F hx of HTN, gout, hypothyroidism, L. 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    #Melena - GI input appreciated.  Transfuse to maintain Hgb > 8.  PPI.  EGD when cleared by Cardiology, Pulmonary.  # VY - Cr 3.2, Baseline 2.1.  S/p IVF.  Monitor BMP.  Renal evaluation.   # Chronic Respiratory Failure with hypoxia, COPD - CPAP, O2.  Pulmonary input.    # LE DVT - holding AC in setting of melena.  LE dopplers  # Chronic Atrial fibrillation - HR stable, holding AC in setting of GI bleed.  # ? CHF TTE 5/2023 LV could not be well visualized.  TTE ordered.   # Inpatient DVT Prophylaxis - consider ICDs if LE dopplers unremarkable.

## 2023-08-10 NOTE — CONSULT NOTE ADULT - TIME BILLING
review of chart, records, blood work, vitals, medication, imaging, direct patient care, d/w respiratory

## 2023-08-10 NOTE — PHYSICAL THERAPY INITIAL EVALUATION ADULT - ADDITIONAL COMMENTS
pt lives alone in a senior apartment with ramp to get to elevator and pt uses elevator to her apt,. pt uses SAC or RW for house hold ambulation and W/C for community amb. pt currently require mod a for bed mob and transfers, pt able to amb x 4ft with RW, presents with short steps, pt reports pain and weakness to alexandro LE, pt assisted back to bed.

## 2023-08-10 NOTE — PHYSICAL THERAPY INITIAL EVALUATION ADULT - GENERAL OBSERVATIONS, REHAB EVAL
pt encountered in bed supine, aaox4, + cardiac monitor and 2l/min nco2
Patient is a 18y F PMHx anorexia, past suicide attempt (drank bleach 1 year ago), p/w weight loss from eating disorder clinic. Will speak with eating disorder team. Get medical clearance evaluation. Patient would not like to be admitted but will have to discuss with family and psych team after labs result. Patient without active SI/HI. No hallucinations.

## 2023-08-10 NOTE — PROGRESS NOTE ADULT - SUBJECTIVE AND OBJECTIVE BOX
Patient: MAUREEN PEÑA [id number] 68y Female                            Hospitalist Attending Note    Reports continued melenotic stools.  No Abdominal pain, nausea, vomiting, diarrhea, nor constipation.     ____________________PHYSICAL EXAM:  GENERAL:  NAD Alert and Oriented x 3   HEENT: NCAT  CARDIOVASCULAR:  S1, S2  LUNGS: CTAB  ABDOMEN:  soft, (-) tenderness, (-) distension, (+) bowel sounds, (-) guarding, (-) rebound (-) rigidity  EXTREMITIES:  no cyanosis / clubbing / edema.   ____________________       VITALS:  Vital Signs Last 24 Hrs  T(C): 36.4 (10 Aug 2023 16:50), Max: 37 (10 Aug 2023 05:05)  T(F): 97.6 (10 Aug 2023 16:50), Max: 98.6 (10 Aug 2023 05:05)  HR: 67 (10 Aug 2023 17:03) (57 - 92)  BP: 131/75 (10 Aug 2023 16:50) (114/76 - 143/54)  BP(mean): --  RR: 18 (10 Aug 2023 16:50) (16 - 18)  SpO2: 100% (10 Aug 2023 17:03) (96% - 100%)    Parameters below as of 10 Aug 2023 16:50  Patient On (Oxygen Delivery Method): room air     Daily     Daily Weight in k (09 Aug 2023 21:15)  CAPILLARY BLOOD GLUCOSE        I&O's Summary      HISTORY:  PAST MEDICAL & SURGICAL HISTORY:  HTN (hypertension)      Gout      Hypothyroid      Asthma      HSV (herpes simplex virus) infection      SOB (shortness of breath)  R/T inhalation of chemicals at work ( [employer] )      History of COPD  On 3LNC sometimes at home      Paroxysmal atrial fibrillation      Essential hypertension      Gout      SONU on CPAP      Stage 3 chronic kidney disease      Anemia      Surgery, elective  right hand - 3 fingers repairs      H/O bariatric surgery      Allergies    No Known Allergies    Intolerances       LABS:                        7.1    6.12  )-----------( 178      ( 10 Aug 2023 10:15 )             22.8     08-10    138  |  118<H>  |  34<H>  ----------------------------<  86  4.6   |  13<L>  |  3.28<H>    Ca    7.6<L>      10 Aug 2023 10:15  Phos  4.4     08-10  Mg     1.8     08-10    TPro  6.8  /  Alb  2.9<L>  /  TBili  0.3  /  DBili  x   /  AST  17  /  ALT  16  /  AlkPhos  130<H>  08-10    PT/INR - ( 10 Aug 2023 10:15 )   PT: 11.0 sec;   INR: 0.92 ratio         PTT - ( 10 Aug 2023 10:15 )  PTT:25.9 sec  LIVER FUNCTIONS - ( 10 Aug 2023 10:15 )  Alb: 2.9 g/dL / Pro: 6.8 gm/dL / ALK PHOS: 130 U/L / ALT: 16 U/L / AST: 17 U/L / GGT: x           Urinalysis Basic - ( 10 Aug 2023 10:15 )    Color: x / Appearance: x / SG: x / pH: x  Gluc: 86 mg/dL / Ketone: x  / Bili: x / Urobili: x   Blood: x / Protein: x / Nitrite: x   Leuk Esterase: x / RBC: x / WBC x   Sq Epi: x / Non Sq Epi: x / Bacteria: x              MEDICATIONS:  MEDICATIONS  (STANDING):  budesonide 160 MICROgram(s)/formoterol 4.5 MICROgram(s) Inhaler 2 Puff(s) Inhalation two times a day  folic acid 1 milliGRAM(s) Oral daily  pantoprazole  Injectable 40 milliGRAM(s) IV Push every 12 hours    MEDICATIONS  (PRN):  acetaminophen     Tablet .. 650 milliGRAM(s) Oral every 6 hours PRN Temp greater or equal to 38C (100.4F), Mild Pain (1 - 3)  albuterol    90 MICROgram(s) HFA Inhaler 2 Puff(s) Inhalation every 6 hours PRN Shortness of Breath and/or Wheezing

## 2023-08-11 LAB
ANION GAP SERPL CALC-SCNC: 7 MMOL/L — SIGNIFICANT CHANGE UP (ref 5–17)
ANION GAP SERPL CALC-SCNC: 9 MMOL/L — SIGNIFICANT CHANGE UP (ref 5–17)
BASE EXCESS BLDA CALC-SCNC: -16.5 MMOL/L — LOW (ref -2–3)
BLOOD GAS COMMENTS ARTERIAL: SIGNIFICANT CHANGE UP
BUN SERPL-MCNC: 30 MG/DL — HIGH (ref 7–23)
BUN SERPL-MCNC: 31 MG/DL — HIGH (ref 7–23)
CALCIUM SERPL-MCNC: 7.4 MG/DL — LOW (ref 8.5–10.1)
CALCIUM SERPL-MCNC: 7.6 MG/DL — LOW (ref 8.5–10.1)
CHLORIDE SERPL-SCNC: 114 MMOL/L — HIGH (ref 96–108)
CHLORIDE SERPL-SCNC: 118 MMOL/L — HIGH (ref 96–108)
CO2 BLDA-SCNC: 11 MMOL/L — LOW (ref 19–24)
CO2 SERPL-SCNC: 10 MMOL/L — CRITICAL LOW (ref 22–31)
CO2 SERPL-SCNC: 16 MMOL/L — LOW (ref 22–31)
CREAT SERPL-MCNC: 3.19 MG/DL — HIGH (ref 0.5–1.3)
CREAT SERPL-MCNC: 3.21 MG/DL — HIGH (ref 0.5–1.3)
EGFR: 15 ML/MIN/1.73M2 — LOW
EGFR: 15 ML/MIN/1.73M2 — LOW
GAS PNL BLDA: SIGNIFICANT CHANGE UP
GLUCOSE SERPL-MCNC: 82 MG/DL — SIGNIFICANT CHANGE UP (ref 70–99)
GLUCOSE SERPL-MCNC: 88 MG/DL — SIGNIFICANT CHANGE UP (ref 70–99)
HCO3 BLDA-SCNC: 10 MMOL/L — LOW (ref 21–28)
HCT VFR BLD CALC: 26.3 % — LOW (ref 34.5–45)
HGB BLD-MCNC: 8 G/DL — LOW (ref 11.5–15.5)
HOROWITZ INDEX BLDA+IHG-RTO: 32 — SIGNIFICANT CHANGE UP
LACTATE SERPL-SCNC: 1.2 MMOL/L — SIGNIFICANT CHANGE UP (ref 0.7–2)
MCHC RBC-ENTMCNC: 30.4 G/DL — LOW (ref 32–36)
MCHC RBC-ENTMCNC: 32 PG — SIGNIFICANT CHANGE UP (ref 27–34)
MCV RBC AUTO: 105.2 FL — HIGH (ref 80–100)
NRBC # BLD: 0 /100 WBCS — SIGNIFICANT CHANGE UP (ref 0–0)
PCO2 BLDA: 25 MMHG — LOW (ref 32–46)
PH BLDA: 7.2 — LOW (ref 7.35–7.45)
PLATELET # BLD AUTO: 165 K/UL — SIGNIFICANT CHANGE UP (ref 150–400)
PO2 BLDA: 161 MMHG — HIGH (ref 83–108)
POTASSIUM SERPL-MCNC: 3.9 MMOL/L — SIGNIFICANT CHANGE UP (ref 3.5–5.3)
POTASSIUM SERPL-MCNC: 4.5 MMOL/L — SIGNIFICANT CHANGE UP (ref 3.5–5.3)
POTASSIUM SERPL-SCNC: 3.9 MMOL/L — SIGNIFICANT CHANGE UP (ref 3.5–5.3)
POTASSIUM SERPL-SCNC: 4.5 MMOL/L — SIGNIFICANT CHANGE UP (ref 3.5–5.3)
RBC # BLD: 2.5 M/UL — LOW (ref 3.8–5.2)
RBC # FLD: 16.8 % — HIGH (ref 10.3–14.5)
SAO2 % BLDA: 99.2 % — HIGH (ref 94–98)
SODIUM SERPL-SCNC: 137 MMOL/L — SIGNIFICANT CHANGE UP (ref 135–145)
SODIUM SERPL-SCNC: 137 MMOL/L — SIGNIFICANT CHANGE UP (ref 135–145)
WBC # BLD: 5.71 K/UL — SIGNIFICANT CHANGE UP (ref 3.8–10.5)
WBC # FLD AUTO: 5.71 K/UL — SIGNIFICANT CHANGE UP (ref 3.8–10.5)

## 2023-08-11 PROCEDURE — 99232 SBSQ HOSP IP/OBS MODERATE 35: CPT

## 2023-08-11 PROCEDURE — 99233 SBSQ HOSP IP/OBS HIGH 50: CPT

## 2023-08-11 PROCEDURE — 93970 EXTREMITY STUDY: CPT | Mod: 26

## 2023-08-11 RX ORDER — SODIUM BICARBONATE 1 MEQ/ML
0.17 SYRINGE (ML) INTRAVENOUS
Qty: 150 | Refills: 0 | Status: COMPLETED | OUTPATIENT
Start: 2023-08-11 | End: 2023-08-11

## 2023-08-11 RX ADMIN — PANTOPRAZOLE SODIUM 40 MILLIGRAM(S): 20 TABLET, DELAYED RELEASE ORAL at 17:06

## 2023-08-11 RX ADMIN — Medication 650 MILLIGRAM(S): at 20:22

## 2023-08-11 RX ADMIN — Medication 650 MILLIGRAM(S): at 21:15

## 2023-08-11 RX ADMIN — Medication 125 MEQ/KG/HR: at 12:25

## 2023-08-11 RX ADMIN — Medication 125 MEQ/KG/HR: at 21:12

## 2023-08-11 RX ADMIN — PANTOPRAZOLE SODIUM 40 MILLIGRAM(S): 20 TABLET, DELAYED RELEASE ORAL at 05:23

## 2023-08-11 NOTE — PROGRESS NOTE ADULT - ASSESSMENT
9yo F with PMH of HTN, gout, hypothyroidism, L. DVT (on apixaban), atrial fibrillation, COPD, SONU on CPAP, s/p sleeve gastrectomy who presents with melena.     # Melena - 2 days of melena in the setting of ongoing heartburn s/p sleeve gastrectomy and AC use. DDX includes esophagitis vs. PUD vs gastritis vs. malignancy. Plan for EGD when medically optimized (acidosis resolves).  # LOMAX/ chest pressure - noted recent TTE with no underlying systolic dysfunction.  # COPD - with some worsening SOB  # VY on CKD  # Acidosis    Recommendations:  - PPI 40mg BID  - trend CBC, CMP, INR  - 2 large bore IVs; active type and screen  - transfuse Hgb > 8, Platelets > 50  - plan for upper endoscopy when medically optimized (acidosis investigated)  - Appreciate pulmonary and cardiology recs  - Hold AC if risk acceptable  - Diet as tolerated for now, possible EGD early next week  - supportive care as per primary team    Thank you for involving us in the care of this patient. Please reach out if any further questions.    Chaparro Xie  Gastroenterology    Available on [employer] Teams  [phone]

## 2023-08-11 NOTE — CONSULT NOTE ADULT - SUBJECTIVE AND OBJECTIVE BOX
68y Female presents with R knee effusion found as incidental finding on BLLE doppler. Patient has no pain and denies trauma.  Community ambulator w/o assistance at baseline. Pt states that she has no pain, is confused as to why I am seeing her. No hx of orthopedic surgeries in the past. Denies CP, SOB, fever, chills, numbness/tingling, weakness or any other complaints. AC being held due to GI Bleed.  She is currently having a gout flair in her right 1st toe.    Vital Signs Last 24 Hrs  T(C): 36.8 (11 Aug 2023 17:04), Max: 36.9 (10 Aug 2023 21:21)  T(F): 98.2 (11 Aug 2023 17:04), Max: 98.5 (10 Aug 2023 21:21)  HR: 88 (11 Aug 2023 17:04) (57 - 88)  BP: 123/65 (11 Aug 2023 17:04) (102/65 - 155/85)  BP(mean): --  RR: 18 (11 Aug 2023 17:04) (18 - 18)  SpO2: 100% (11 Aug 2023 17:04) (98% - 100%)    Parameters below as of 11 Aug 2023 17:04  Patient On (Oxygen Delivery Method): room air        PHYSICAL EXAM  General: NAD, Awake and Alert    RLE  Skin intact  Knee effusion present.  No Ecchymosis/redness/warmth noted  No TTP   Able to SLR  Neg Axial Load/Log Roll  FROM Hip/Knee/Ankle  + EHL/FHL/GS/TA  SILT Tib/SPN/DPN/Sural/Saph  DP/PT Palpable  No Calf TTP  Compartments soft compressible       IMAGING:  XR : pending   CT :    Assessment/Plan:  68y Female with R knee Effusion likely from degenerative changes.     -Pain control as needed  -DVT ppx: Per primary  -WBAT   -FU R knee XR  - Ortho stable for dc  - No acute orthopedic surgical intervention  - Follow up with DR Valle as needed, call office for appointment.   -Will discuss with attending, and advise if plan changes   HPI:  68-year-old female admitted to the hospital for GI bleeding.  During work-up for lower extremity deep venous thrombosis, a bilateral lower extremity Doppler identified a right knee effusion.  Orthopedics was consulted for evaluation, management, and recommendations.  The patient endorses that she is having a "gout flare" in her right first toe, which she reports has had several times previously.  The patient denies any right knee pain, fevers, chills, recent injury to the right knee, or any other orthopedic complaints at this time.    Review of systems – negative unless otherwise noted above    Allergies: Denies  Medical history: Hypertension, gout, hypothyroidism, left lower extremity DVT, atrial fibrillation, COPD, sleep apnea, chronic anemia  Surgical history: Right hand surgery, bariatric surgery      Objective  Vital Signs Last 24 Hrs  T(C): 36.8 (11 Aug 2023 17:04), Max: 36.9 (10 Aug 2023 21:21)  T(F): 98.2 (11 Aug 2023 17:04), Max: 98.5 (10 Aug 2023 21:21)  HR: 88 (11 Aug 2023 17:04) (57 - 88)  BP: 123/65 (11 Aug 2023 17:04) (102/65 - 155/85)  BP(mean): --  RR: 18 (11 Aug 2023 17:04) (18 - 18)  SpO2: 100% (11 Aug 2023 17:04) (98% - 100%)    Parameters below as of 11 Aug 2023 17:04  Patient On (Oxygen Delivery Method): room air        PHYSICAL EXAM  General: NAD, Awake and Alert  Right lower extremity:  Knee exam: Skin intact.  Moderate knee effusion present.  No erythema, warmth, or significant tenderness to palpation.  Patient is able to straight leg raise.  Knee active range of motion grossly intact without significant pain throughout active range of motion. + EHL/FHL/GS/TA.  Sensation grossly intact to light touch.  Capillary refill brisk.  No ankle clonus.      Imaging:  X-ray right knee AP, oblique, lateral performed on 8/12/2023 from [facility] demonstrates severe tricompartmental joint space narrowing with osteophyte formation and joint line deformation.  No acute fracture or dislocation identified.    Report of bilateral lower extremity ultrasound venous Doppler performed on 8/11/2023 from [facility] describes "No acute DVT of either lower extremity. Large right knee joint effusion with apparent internal complexity. Small right popliteal fossa Baker's cyst measures 2.9 x 0.8 x 1.6 cm. Bilateral lower extremity superficial soft tissue edema, correlate clinically."      Assessment and plan:  68-year-old female with right knee osteoarthritis    Ambulate as tolerated  Tylenol as needed  No orthopedic contraindication to chemical anticoagulation  From an orthopedic surgical perspective, patient is stable for discharge  Please reconsult as needed

## 2023-08-11 NOTE — PROGRESS NOTE ADULT - SUBJECTIVE AND OBJECTIVE BOX
Patient is a 68y old  Female who presents with a chief complaint of GI bleeding (11 Aug 2023 13:21)      INTERVAL HPI/OVERNIGHT EVENTS:    MEDICATIONS  (STANDING):  budesonide 160 MICROgram(s)/formoterol 4.5 MICROgram(s) Inhaler 2 Puff(s) Inhalation two times a day  folic acid 1 milliGRAM(s) Oral daily  pantoprazole  Injectable 40 milliGRAM(s) IV Push every 12 hours  sodium bicarbonate  Infusion 0.175 mEq/kG/Hr (125 mL/Hr) IV Continuous <Continuous>    MEDICATIONS  (PRN):  acetaminophen     Tablet .. 650 milliGRAM(s) Oral every 6 hours PRN Temp greater or equal to 38C (100.4F), Mild Pain (1 - 3)  albuterol    90 MICROgram(s) HFA Inhaler 2 Puff(s) Inhalation every 6 hours PRN Shortness of Breath and/or Wheezing      Allergies    No Known Allergies    Intolerances        REVIEW OF SYSTEMS:  CONSTITUTIONAL: No fever, weight loss, or fatigue  EYES: No eye pain, visual disturbances, or discharge  ENMT:  No difficulty hearing, tinnitus, vertigo; No sinus or throat pain  NECK: No pain or stiffness  BREASTS: No pain, masses, or nipple discharge  RESPIRATORY: No cough, wheezing, chills or hemoptysis; No shortness of breath  CARDIOVASCULAR: No chest pain, palpitations, dizziness, or leg swelling  GASTROINTESTINAL: No abdominal or epigastric pain. No nausea, vomiting, or hematemesis; No diarrhea or constipation. No melena or hematochezia.  GENITOURINARY: No dysuria, frequency, hematuria, or incontinence  NEUROLOGICAL: No headaches, memory loss, loss of strength, numbness, or tremors  SKIN: No itching, burning, rashes, or lesions   LYMPH NODES: No enlarged glands  ENDOCRINE: No heat or cold intolerance; No hair loss  MUSCULOSKELETAL: No joint pain or swelling; No muscle, back, or extremity pain  PSYCHIATRIC: No depression, anxiety, mood swings, or difficulty sleeping  HEME/LYMPH: No easy bruising, or bleeding gums  ALLERGY AND IMMUNOLOGIC: No hives or eczema    Vital Signs Last 24 Hrs  T(C): 36.5 (11 Aug 2023 04:16), Max: 36.9 (10 Aug 2023 21:21)  T(F): 97.7 (11 Aug 2023 04:16), Max: 98.5 (10 Aug 2023 21:21)  HR: 85 (11 Aug 2023 16:17) (57 - 85)  BP: 102/65 (11 Aug 2023 04:16) (102/65 - 155/85)  BP(mean): --  RR: 18 (11 Aug 2023 04:16) (18 - 18)  SpO2: 100% (11 Aug 2023 16:17) (98% - 100%)    Parameters below as of 11 Aug 2023 04:16  Patient On (Oxygen Delivery Method): nasal cannula        PHYSICAL EXAM:  GENERAL: NAD, well-groomed, well-developed  HEAD:  Atraumatic, Normocephalic  EYES: EOMI, PERRLA, conjunctiva and sclera clear  ENMT: No tonsillar erythema, exudates, or enlargement; Moist mucous membranes, Good dentition, No lesions  NECK: Supple, No JVD, Normal thyroid  NERVOUS SYSTEM:  Alert & Oriented X3, Good concentration; Motor Strength 5/5 B/L upper and lower extremities; DTRs 2+ intact and symmetric  CHEST/LUNG: Clear to percussion bilaterally; No rales, rhonchi, wheezing, or rubs  HEART: Regular rate and rhythm; No murmurs, rubs, or gallops  ABDOMEN: Soft, Nontender, Nondistended; Bowel sounds present  EXTREMITIES:  2+ Peripheral Pulses, No clubbing, cyanosis, or edema  LYMPH: No lymphadenopathy noted  SKIN: No rashes or lesions    LABS:                        8.0    5.71  )-----------( 165      ( 11 Aug 2023 06:32 )             26.3     08-11    137  |  118<H>  |  31<H>  ----------------------------<  82  4.5   |  10<LL>  |  3.21<H>    Ca    7.6<L>      11 Aug 2023 06:32  Phos  4.4     08-10  Mg     1.8     08-10    TPro  6.8  /  Alb  2.9<L>  /  TBili  0.3  /  DBili  x   /  AST  17  /  ALT  16  /  AlkPhos  130<H>  08-10    PT/INR - ( 10 Aug 2023 10:15 )   PT: 11.0 sec;   INR: 0.92 ratio         PTT - ( 10 Aug 2023 10:15 )  PTT:25.9 sec  Urinalysis Basic - ( 11 Aug 2023 06:32 )    Color: x / Appearance: x / SG: x / pH: x  Gluc: 82 mg/dL / Ketone: x  / Bili: x / Urobili: x   Blood: x / Protein: x / Nitrite: x   Leuk Esterase: x / RBC: x / WBC x   Sq Epi: x / Non Sq Epi: x / Bacteria: x      CAPILLARY BLOOD GLUCOSE          RADIOLOGY & ADDITIONAL TESTS:    Imaging Personally Reviewed:  [ ] YES  [ ] NO    Consultant(s) Notes Reviewed:  [ ] YES  [ ] NO    Care Discussed with Consultants/Other Providers [ ] YES  [ ] NO Patient is a 68y old  Female who presents with a chief complaint of GI bleeding (11 Aug 2023 13:21)      INTERVAL HPI/OVERNIGHT EVENTS: pt doing ok, states that overall everything hurts her ---and is not happy b/c cannot eat and unable to sleep in the last few days b/c someone is constantly waking her up.    MEDICATIONS  (STANDING):  budesonide 160 MICROgram(s)/formoterol 4.5 MICROgram(s) Inhaler 2 Puff(s) Inhalation two times a day  folic acid 1 milliGRAM(s) Oral daily  pantoprazole  Injectable 40 milliGRAM(s) IV Push every 12 hours  sodium bicarbonate  Infusion 0.175 mEq/kG/Hr (125 mL/Hr) IV Continuous <Continuous>    MEDICATIONS  (PRN):  acetaminophen     Tablet .. 650 milliGRAM(s) Oral every 6 hours PRN Temp greater or equal to 38C (100.4F), Mild Pain (1 - 3)  albuterol    90 MICROgram(s) HFA Inhaler 2 Puff(s) Inhalation every 6 hours PRN Shortness of Breath and/or Wheezing      Allergies    No Known Allergies    Intolerances        REVIEW OF SYSTEMS:  negative except as above    Vital Signs Last 24 Hrs  T(C): 36.5 (11 Aug 2023 04:16), Max: 36.9 (10 Aug 2023 21:21)  T(F): 97.7 (11 Aug 2023 04:16), Max: 98.5 (10 Aug 2023 21:21)  HR: 85 (11 Aug 2023 16:17) (57 - 85)  BP: 102/65 (11 Aug 2023 04:16) (102/65 - 155/85)  BP(mean): --  RR: 18 (11 Aug 2023 04:16) (18 - 18)  SpO2: 100% (11 Aug 2023 16:17) (98% - 100%)    Parameters below as of 11 Aug 2023 04:16  Patient On (Oxygen Delivery Method): nasal cannula        PHYSICAL EXAM:  GENERAL: NAD, well-groomed, well-developed  HEAD:  Atraumatic, Normocephalic  EYES: EOMI, PERRLA, conjunctiva and sclera clear  ENMT: No tonsillar erythema, exudates, or enlargement; Moist mucous membranes, Good dentition, No lesions  NECK: Supple, No JVD,   NERVOUS SYSTEM:  Alert & Oriented X3, Good concentration; Motor Strength 5/5 B/L upper and lower extremities; DTRs 2+ intact and symmetric  CHEST/LUNG: Clear to percussion bilaterally; No rales, rhonchi, wheezing, or rubs  HEART: Regular rate and rhythm; No murmurs, rubs, or gallops  ABDOMEN: Soft, Nontender, Nondistended; Bowel sounds present  EXTREMITIES:  2+ Peripheral Pulses, No clubbing, cyanosis, or edema, right knee edema  SKIN: No rashes or lesions    LABS:                        8.0    5.71  )-----------( 165      ( 11 Aug 2023 06:32 )             26.3     08-11    137  |  118<H>  |  31<H>  ----------------------------<  82  4.5   |  10<LL>  |  3.21<H>    Ca    7.6<L>      11 Aug 2023 06:32  Phos  4.4     08-10  Mg     1.8     08-10    TPro  6.8  /  Alb  2.9<L>  /  TBili  0.3  /  DBili  x   /  AST  17  /  ALT  16  /  AlkPhos  130<H>  08-10    PT/INR - ( 10 Aug 2023 10:15 )   PT: 11.0 sec;   INR: 0.92 ratio         PTT - ( 10 Aug 2023 10:15 )  PTT:25.9 sec  Urinalysis Basic - ( 11 Aug 2023 06:32 )    Color: x / Appearance: x / SG: x / pH: x  Gluc: 82 mg/dL / Ketone: x  / Bili: x / Urobili: x   Blood: x / Protein: x / Nitrite: x   Leuk Esterase: x / RBC: x / WBC x   Sq Epi: x / Non Sq Epi: x / Bacteria: x      CAPILLARY BLOOD GLUCOSE          RADIOLOGY & ADDITIONAL TESTS:    Imaging Personally Reviewed:  [ ] YES  [ ] NO    Consultant(s) Notes Reviewed:  [ ] YES  [ ] NO    Care Discussed with Consultants/Other Providers [ ] YES  [ ] NO

## 2023-08-11 NOTE — PROGRESS NOTE ADULT - SUBJECTIVE AND OBJECTIVE BOX
24 hr events  no acute events  VBG yesterday w/ severe metabolic acidosis with respiratory alkalosis 7.14/29 /56 /10 /87.1 %  ABG today 7.20/25 /161 / 10 / 99.2 %  did not use BiPAP overnight  on 3L NC  no SOB  s/p 1 unit pRBC overnight  1BM overnight  started on bicarb drip for metabolic acidosis  reports R big toe and ankle pain    ROS  no fever, no chills  no HA, no dizziness  no visual changes, no auditory changes  no sore throat, no sinus congestion  no SOB, no cough  no chest pain, no palpitations  no abdominal pain, no N/V/D  no dysuria, no hematuria  + R ankle and big toe pain "I think my gout is acting up"  no rashes, no pruritis    MEDICATIONS  (STANDING):  budesonide 160 MICROgram(s)/formoterol 4.5 MICROgram(s) Inhaler 2 Puff(s) Inhalation two times a day  folic acid 1 milliGRAM(s) Oral daily  pantoprazole  Injectable 40 milliGRAM(s) IV Push every 12 hours  sodium bicarbonate  Infusion 0.175 mEq/kG/Hr (125 mL/Hr) IV Continuous <Continuous>    MEDICATIONS  (PRN):  acetaminophen     Tablet .. 650 milliGRAM(s) Oral every 6 hours PRN Temp greater or equal to 38C (100.4F), Mild Pain (1 - 3)  albuterol    90 MICROgram(s) HFA Inhaler 2 Puff(s) Inhalation every 6 hours PRN Shortness of Breath and/or Wheezing      LABS               8.0    5.71  )-----------( 165      ( 11 Aug 2023 06:32 )             26.3       08-11    137  |  118<H>  |  31<H>  ----------------------------<  82  4.5   |  10<LL>  |  3.21<H>    Ca    7.6<L>      11 Aug 2023 06:32  Phos  4.4     08-10  Mg     1.8     08-10    TPro  6.8  /  Alb  2.9<L>  /  TBili  0.3  /  DBili  x   /  AST  17  /  ALT  16  /  AlkPhos  130<H>  08-10    Blood Gas Profile - Arterial (08.11.23 @ 05:13)    pH, Arterial: 7.20   pCO2, Arterial: 25 mmHg   pO2, Arterial: 161 mmHg   HCO3, Arterial: 10 mmol/L   Base Excess, Arterial: -16.5 mmol/L   Oxygen Saturation, Arterial: 99.2 %   Total CO2, Arterial: 11 mmol/L   FIO2, Arterial: 32.0   Blood Gas Comments Arterial: Patient was on Room Air.  Blood gas drawn from Right Radial Artery.  Held the site, no hematoma/bleeding noted.  Positive Maikel's test noted.      IMAGING  < from: CT Abdomen and Pelvis No Cont (08.09.23 @ 18:31) >  LOWER CHEST: Within normal limits.    LIVER: Within normal limits.  BILE DUCTS: Normal caliber.  GALLBLADDER: Cholelithiasis without evidence of cholecystitis.  SPLEEN: Within normal limits.  PANCREAS: Within normal limits.  ADRENALS: Within normal limits.  KIDNEYS/URETERS: Within normal limits.    BLADDER: Within normal limits.  REPRODUCTIVE ORGANS: IUD in position. No adnexal lesion.    BOWEL: Sleeve gastrectomy. No bowel obstruction. Few colonic diverticula   without evidence of diverticulitis. Appendix is not visualized. No   evidence of inflammation in the pericecal region.  PERITONEUM: No ascites.  VESSELS: Atherosclerotic changes.  RETROPERITONEUM/LYMPH NODES: No lymphadenopathy.  ABDOMINAL WALL: Within normal limits.  BONES: Degenerative changes.    IMPRESSION:  No bowel obstruction or inflammation.  Few colonic diverticula without diverticulitis. No abnormal mass along   the GI tract.    Cholelithiasis without evidence of cholecystitis.  -----------------------------------  < from: US Duplex Venous Lower Ext Complete, Bilateral (08.11.23 @ 10:52) >  No acute DVT of either lower extremity.    Large right knee joint effusion with apparent internal complexity.    Small right popliteal fossa Baker's cyst measures 2.9 x 0.8 x 1.6 cm.    Bilateral lower extremity superficial soft tissue edema, correlate   clinically.              VITALS  T(C): 36.5 (11 Aug 2023 04:16), Max: 36.9 (10 Aug 2023 21:21)  T(F): 97.7 (11 Aug 2023 04:16), Max: 98.5 (10 Aug 2023 21:21)  HR: 85 (11 Aug 2023 16:17) (57 - 85)  BP: 102/65 (11 Aug 2023 04:16) (102/65 - 155/85)  RR: 18 (11 Aug 2023 04:16) (18 - 18)  SpO2: 100% (11 Aug 2023 16:17) (98% - 100%)    Parameters below as of 11 Aug 2023 04:16  Patient On (Oxygen Delivery Method): nasal cannula      EXAM  GEN: obese female, NAD, comfortable in bed  HEENT: NC/AT, EOMI, sclera white  CV: RRR  PULM: CTA bilaterally, no wheeze, no rales, no rhonchi  ABD: soft NT, ND,  + BS  EXT: R lateral ankle edema, no warmth or swelling of R big toe or MTP joint however tender to palpation  NEURO: AAOx3

## 2023-08-11 NOTE — PROGRESS NOTE ADULT - SUBJECTIVE AND OBJECTIVE BOX
Interval Events:   - No further melena  - Significant acidosis on ABG    Allergies:  No Known Allergies      Hospital Medications:  acetaminophen     Tablet .. 650 milliGRAM(s) Oral every 6 hours PRN  albuterol    90 MICROgram(s) HFA Inhaler 2 Puff(s) Inhalation every 6 hours PRN  budesonide 160 MICROgram(s)/formoterol 4.5 MICROgram(s) Inhaler 2 Puff(s) Inhalation two times a day  folic acid 1 milliGRAM(s) Oral daily  pantoprazole  Injectable 40 milliGRAM(s) IV Push every 12 hours  sodium bicarbonate  Infusion 0.175 mEq/kG/Hr IV Continuous <Continuous>      PMHX/PSHX:  HTN (hypertension)    Gout    Hypothyroid    Asthma    HSV (herpes simplex virus) infection    SOB (shortness of breath)    History of COPD    Paroxysmal atrial fibrillation    Essential hypertension    Gout    SONU on CPAP    Stage 3 chronic kidney disease    Anemia    Surgery, elective    H/O bariatric surgery        Family history:  No pertinent family history in first degree relatives        ROS: As per HPI, otherwise 14-point ROS reviewed and negative.      PHYSICAL EXAM:   Vital Signs:  Vital Signs Last 24 Hrs  T(C): 36.5 (11 Aug 2023 04:16), Max: 36.9 (10 Aug 2023 21:21)  T(F): 97.7 (11 Aug 2023 04:16), Max: 98.5 (10 Aug 2023 21:21)  HR: 57 (11 Aug 2023 08:55) (57 - 83)  BP: 102/65 (11 Aug 2023 04:16) (102/65 - 155/85)  BP(mean): --  RR: 18 (11 Aug 2023 04:16) (18 - 18)  SpO2: 98% (11 Aug 2023 08:55) (98% - 100%)    Parameters below as of 11 Aug 2023 04:16  Patient On (Oxygen Delivery Method): nasal cannula      Daily     Daily       08-10-23 @ 07:01  -  08-11-23 @ 07:00  --------------------------------------------------------  IN: 360 mL / OUT: 400 mL / NET: -40 mL    GENERAL:  No acute distress, on NC  HEENT:  Normocephalic/atraumatic, no scleral icterus  CHEST:  No accessory muscle use  HEART:  Regular rate and rhythm  ABDOMEN:  Soft, non-tender, non-distended  EXTREMITIES: No cyanosis, clubbing, or edema  SKIN:  No rash  NEURO:  Alert and oriented x 3, no asterixis    LABS:                        8.0    5.71  )-----------( 165      ( 11 Aug 2023 06:32 )             26.3     Mean Cell Volume: 105.2 fl (08-11-23 @ 06:32)    08-11    137  |  118<H>  |  31<H>  ----------------------------<  82  4.5   |  10<LL>  |  3.21<H>    Ca    7.6<L>      11 Aug 2023 06:32  Phos  4.4     08-10  Mg     1.8     08-10    TPro  6.8  /  Alb  2.9<L>  /  TBili  0.3  /  DBili  x   /  AST  17  /  ALT  16  /  AlkPhos  130<H>  08-10    LIVER FUNCTIONS - ( 10 Aug 2023 10:15 )  Alb: 2.9 g/dL / Pro: 6.8 gm/dL / ALK PHOS: 130 U/L / ALT: 16 U/L / AST: 17 U/L / GGT: x           PT/INR - ( 10 Aug 2023 10:15 )   PT: 11.0 sec;   INR: 0.92 ratio         PTT - ( 10 Aug 2023 10:15 )  PTT:25.9 sec  Urinalysis Basic - ( 11 Aug 2023 06:32 )    Color: x / Appearance: x / SG: x / pH: x  Gluc: 82 mg/dL / Ketone: x  / Bili: x / Urobili: x   Blood: x / Protein: x / Nitrite: x   Leuk Esterase: x / RBC: x / WBC x   Sq Epi: x / Non Sq Epi: x / Bacteria: x                              8.0    5.71  )-----------( 165      ( 11 Aug 2023 06:32 )             26.3                         7.1    6.12  )-----------( 178      ( 10 Aug 2023 10:15 )             22.8                         7.8    7.20  )-----------( 175      ( 09 Aug 2023 16:06 )             25.9       Imaging:

## 2023-08-11 NOTE — PROGRESS NOTE ADULT - ASSESSMENT
68 year old F hx of HTN, gout, hypothyroidism, L. DVT, atrial fibrillation, COPD is on NC at home from exposure [place] , SNOU on CPAP, chronic anemia who presents w/ melena for 3-4 days.    #Melena - Transfuse to maintain Hgb > 8.  PPI.  As per GI- severe metabolic acidosis needs to be resolved before patient can undergo EGD. Cardiology and Pulm recs appreciated.   # VY/CKD - Cr 3.2, Baseline 2.1.  S/p IVF.  Monitor BMP.  Renal evaluation appreciated  # Chronic Respiratory Failure with hypoxia, COPD - CPAP, O2.  Pulmonary input.    # LE DVT - holding AC in setting of melena.  LE dopplers----no DVT in either extremity, but   #Large right knee joint effusion and small right popliteal fossa Baker's cyst measure --will ask Ortho to evaluate.   # Chronic Atrial fibrillation - HR stable, holding AC in setting of GI bleed for now   # ? CHF TTE 5/2023 LV could not be well visualized.  TTE -- 55 to   60%.    # Inpatient DVT Prophylaxis - ICDs 68 year old F hx of HTN, gout, hypothyroidism, L. 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    #Melena - Transfuse to maintain Hgb > 8.  PPI.  As per GI- severe metabolic acidosis needs to be resolved before patient can undergo EGD. Cardiology and Pulm recs appreciated.   # VY/CKD - Cr 3.2, Baseline 2.1.  S/p IVF.  Monitor BMP.  Renal evaluation appreciated  # Chronic Respiratory Failure with hypoxia, COPD - CPAP, O2.  Pulmonary input.    # LE DVT - holding AC in setting of melena.  LE dopplers----no DVT in either extremity  #Large right knee joint effusion and small right popliteal fossa Baker's cyst measure --will ask Ortho to evaluate. Will order xrays of right knee  # Chronic Atrial fibrillation - HR stable, holding AC in setting of GI bleed for now   # ? CHF TTE 5/2023 LV could not be well visualized.  TTE -- 55 to   60%.    # Inpatient DVT Prophylaxis - ICDs

## 2023-08-11 NOTE — PROGRESS NOTE ADULT - ASSESSMENT
68F HTN, gout, hypothyroidism, L. DVT, atrial fibrillation, COPD on home O2 (from exposure [place] ), SONU on CPAP, obesity s/p gastric sleeve, chronic anemia here with melena, acute on chronic anemia. Seen by GI with plans for endoscopy. Requesting pulmonary clearance. Found to have severe metabolic acidosis pH 7.1, HCO3: 10.    DX: GI bleed, anemia due to blood loss, melena, metabolic acidosis, , VY on CKD, COPD with oxygen dependence, chronic hypoxic respiratory failure, SONU on CPAP, obesity    - cont bronchodilators for underlying COPD: on home trelegy, will give hospital equivalent (symbicort + spiriva), albuterol prn  - cont nocturnal CPAP for sleep apnea  - currently at baseline O2 requirement of 3L NC  - maintain O2 sat in the low 90s  - lung exam clear  - she had to be reintubated post gastric sleeve secondary to hypoxia and was successfully extubated the following day  - review of VBG with pH 7.1, CO2: 29, Po2: 56, HCO3: 10  - she has by VBG underlying severe metabolic acidosis with compensatory respiratory alkalosis  - ABG done confirming persistent metabolic acidosis  - on bicarb drip  - Cr elevated compared to prior labs, VY on CKD  - pt reports she had multiple bouts of BM with melena prior to coming to hospital, likely component of dehydration with VY and metabolic acidosis from bicarb loss with frequent stooling.   - would correct underlying metabolic acidosis prior to proceeding with procedure  - SOB improved. Her SOB likely multifactorial: underlying COPD, acute on chronic anemia, with some SOB from respiratory compensation of metabolic acidosis   - renal on board  - ?gout flare: on home colchicine, would resume  - monitor for further bleeding, H/H trend s/p 1 unit pRBC overnight  - EGD once medically stabilized

## 2023-08-11 NOTE — CONSULT NOTE ADULT - SUBJECTIVE AND OBJECTIVE BOX
Patient chart reviewed, full consult to follow.       Trend Bun/Cr  08-11-23 @ 06:32  BUN/CR -  31<H> / 3.21<H>  08-10-23 @ 10:15  BUN/CR -  34<H> / 3.28<H>  08-09-23 @ 16:06  BUN/CR -  32<H> / 3.27<H>  05-31-22 @ 07:29  BUN/CR -  18 / 2.16<H>  05-30-22 @ 06:25  BUN/CR -  18 / 2.17<H>  05-29-22 @ 06:14  BUN/CR -  18 / 2.32<H>  05-28-22 @ 07:56  BUN/CR -  19 / 2.54<H>  05-27-22 @ 07:15  BUN/CR -  23 / 2.53<H>  05-26-22 @ 17:27  BUN/CR -  25<H> / 2.60<H>    Thank you for the courtesy of this consultation. [facility] NEPHROLOGY SERVICES, [facility]  NEPHROLOGY AND HYPERTENSION  [address]  [address]  [place] [address]  [phone]    MD ANNE MARIE HEWITT MD YELENA ROSENBERG, MD BINNY KOSHY, MD CHRISTOPHER CAPUTO, MD EDWARD BOVER, MD      Information from chart:  "Patient is a 68y old  Female who presents with a chief complaint of GI bleeding (11 Aug 2023 13:21)    HPI:  68 year old F hx of HTN, gout, hypothyroidism, L. 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 She has multiple medical complaints. For melena, she takes ibuprofen/advil/motrin, never had endoscopy, last c-scope was wnl several years prior. She also endorses feeling tight in her neck and having swallowing issues. She had hypothyroidism for some time, but is no longer on synthroid. She is drinking liquids currently to stay hydrated. She also endorses chest pressure and tightness and near syncope today. She denies palpitations and states the pain is more dull in nature.     Troponin was 81 in the setting of elevated creatinine. EKG was NSR no acute ST elevation or pathologic Q waves. Qtc wnl, SD 1st degree AV block.   creatinine 3.27 from baseline 2.16, potassium noted to be 5.6. Hemoglobin 7.6 from baseline around 9s.        (09 Aug 2023 18:49)   "    PAST MEDICAL & SURGICAL HISTORY:  HTN (hypertension)      Gout      Hypothyroid      Asthma      HSV (herpes simplex virus) infection      SOB (shortness of breath)  R/T inhalation of chemicals at work ( [employer] )      History of COPD  On 3LNC sometimes at home      Paroxysmal atrial fibrillation      Essential hypertension      Gout      SONU on CPAP      Stage 3 chronic kidney disease      Anemia      Surgery, elective  right hand - 3 fingers repairs      H/O bariatric surgery        FAMILY HISTORY:  No pertinent family history in first degree relatives      Allergies    No Known Allergies    Intolerances      Home Medications:  colchicine 0.6 mg oral capsule: 1 orally once a day (09 Aug 2023 18:41)  Eliquis 5 mg oral tablet: 1 orally 2 times a day (09 Aug 2023 18:40)  folic acid 1 mg daily:  (09 Aug 2023 18:41)  ProAir HFA 90 mcg/inh inhalation aerosol: 2 puff(s) inhaled every 6 hours as needed for shortness of breath or cough (09 Aug 2023 18:41)  spironolactone 50 mg oral tablet: 1 tab(s) orally once a day (09 Aug 2023 18:41)  Trelegy Ellipta 100 mcg-62.5 mcg-25 mcg/inh inhalation powder: 1 puff(s) inhaled once a day (09 Aug 2023 18:41)    MEDICATIONS  (STANDING):  budesonide 160 MICROgram(s)/formoterol 4.5 MICROgram(s) Inhaler 2 Puff(s) Inhalation two times a day  folic acid 1 milliGRAM(s) Oral daily  pantoprazole  Injectable 40 milliGRAM(s) IV Push every 12 hours  sodium bicarbonate  Infusion 0.175 mEq/kG/Hr (125 mL/Hr) IV Continuous <Continuous>    MEDICATIONS  (PRN):  acetaminophen     Tablet .. 650 milliGRAM(s) Oral every 6 hours PRN Temp greater or equal to 38C (100.4F), Mild Pain (1 - 3)  albuterol    90 MICROgram(s) HFA Inhaler 2 Puff(s) Inhalation every 6 hours PRN Shortness of Breath and/or Wheezing    Vital Signs Last 24 Hrs  T(C): 36.5 (11 Aug 2023 04:16), Max: 36.9 (10 Aug 2023 21:21)  T(F): 97.7 (11 Aug 2023 04:16), Max: 98.5 (10 Aug 2023 21:21)  HR: 85 (11 Aug 2023 16:17) (57 - 85)  BP: 102/65 (11 Aug 2023 04:16) (102/65 - 155/85)  BP(mean): --  RR: 18 (11 Aug 2023 04:16) (18 - 18)  SpO2: 100% (11 Aug 2023 16:17) (98% - 100%)    Parameters below as of 11 Aug 2023 04:16  Patient On (Oxygen Delivery Method): nasal cannula        Daily     Daily     08-10-23 @ 07:01  -  08-11-23 @ 07:00  --------------------------------------------------------  IN: 360 mL / OUT: 400 mL / NET: -40 mL      CAPILLARY BLOOD GLUCOSE        PHYSICAL EXAM:      T(C): 36.5 (08-11-23 @ 04:16), Max: 36.9 (08-10-23 @ 21:21)  HR: 85 (08-11-23 @ 16:17) (57 - 85)  BP: 102/65 (08-11-23 @ 04:16) (102/65 - 155/85)  RR: 18 (08-11-23 @ 04:16) (18 - 18)  SpO2: 100% (08-11-23 @ 16:17) (98% - 100%)  Wt(kg): --  Lungs clear  Heart S1S2  Abd soft NT ND  Extremities:   tr edema              08-11    137  |  118<H>  |  31<H>  ----------------------------<  82  4.5   |  10<LL>  |  3.21<H>    Ca    7.6<L>      11 Aug 2023 06:32  Phos  4.4     08-10  Mg     1.8     08-10    TPro  6.8  /  Alb  2.9<L>  /  TBili  0.3  /  DBili  x   /  AST  17  /  ALT  16  /  AlkPhos  130<H>  08-10                          8.0    5.71  )-----------( 165      ( 11 Aug 2023 06:32 )             26.3     Creatinine Trend: 3.21<--, 3.28<--, 3.27<--  Urinalysis Basic - ( 11 Aug 2023 06:32 )    Color: x / Appearance: x / SG: x / pH: x  Gluc: 82 mg/dL / Ketone: x  / Bili: x / Urobili: x   Blood: x / Protein: x / Nitrite: x   Leuk Esterase: x / RBC: x / WBC x   Sq Epi: x / Non Sq Epi: x / Bacteria: x      ABG - ( 11 Aug 2023 05:13 )  pH, Arterial: 7.20  pH, Blood: x     /  pCO2: 25    /  pO2: 161   / HCO3: 10    / Base Excess: -16.5 /  SaO2: 99.2            Trend Bun/Cr  08-11-23 @ 06:32  BUN/CR -  31<H> / 3.21<H>  08-10-23 @ 10:15  BUN/CR -  34<H> / 3.28<H>  08-09-23 @ 16:06  BUN/CR -  32<H> / 3.27<H>  05-31-22 @ 07:29  BUN/CR -  18 / 2.16<H>  05-30-22 @ 06:25  BUN/CR -  18 / 2.17<H>  05-29-22 @ 06:14  BUN/CR -  18 / 2.32<H>  05-28-22 @ 07:56  BUN/CR -  19 / 2.54<H>  05-27-22 @ 07:15  BUN/CR -  23 / 2.53<H>  05-26-22 @ 17:27  BUN/CR -  25<H> / 2.60<H>        Assessment   VY CKD 3-4; pre renal azotemia, risk for ATN related to acute anemia   Severe metabolic acidosis; gap/ non gap     Plan  IVF with bicarbonate   Follow lactic acid level   Supportive care  Avoid nephrotoxins      Harsha Cornelius MD

## 2023-08-12 LAB
ANION GAP SERPL CALC-SCNC: 6 MMOL/L — SIGNIFICANT CHANGE UP (ref 5–17)
BUN SERPL-MCNC: 26 MG/DL — HIGH (ref 7–23)
CALCIUM SERPL-MCNC: 7.5 MG/DL — LOW (ref 8.5–10.1)
CHLORIDE SERPL-SCNC: 113 MMOL/L — HIGH (ref 96–108)
CK MB BLD-MCNC: 1.3 % — SIGNIFICANT CHANGE UP (ref 0–3.5)
CK MB CFR SERPL CALC: 1.8 NG/ML — SIGNIFICANT CHANGE UP (ref 0.5–3.6)
CK SERPL-CCNC: 137 U/L — SIGNIFICANT CHANGE UP (ref 26–192)
CO2 SERPL-SCNC: 18 MMOL/L — LOW (ref 22–31)
CREAT SERPL-MCNC: 3.17 MG/DL — HIGH (ref 0.5–1.3)
EGFR: 16 ML/MIN/1.73M2 — LOW
GLUCOSE SERPL-MCNC: 100 MG/DL — HIGH (ref 70–99)
HCT VFR BLD CALC: 25.7 % — LOW (ref 34.5–45)
HGB BLD-MCNC: 8 G/DL — LOW (ref 11.5–15.5)
MAGNESIUM SERPL-MCNC: 1.5 MG/DL — LOW (ref 1.6–2.6)
MCHC RBC-ENTMCNC: 31.1 G/DL — LOW (ref 32–36)
MCHC RBC-ENTMCNC: 32.3 PG — SIGNIFICANT CHANGE UP (ref 27–34)
MCV RBC AUTO: 103.6 FL — HIGH (ref 80–100)
NRBC # BLD: 0 /100 WBCS — SIGNIFICANT CHANGE UP (ref 0–0)
PHOSPHATE SERPL-MCNC: 3.2 MG/DL — SIGNIFICANT CHANGE UP (ref 2.5–4.5)
PLATELET # BLD AUTO: 166 K/UL — SIGNIFICANT CHANGE UP (ref 150–400)
POTASSIUM SERPL-MCNC: 4.2 MMOL/L — SIGNIFICANT CHANGE UP (ref 3.5–5.3)
POTASSIUM SERPL-SCNC: 4.2 MMOL/L — SIGNIFICANT CHANGE UP (ref 3.5–5.3)
RBC # BLD: 2.48 M/UL — LOW (ref 3.8–5.2)
RBC # FLD: 17.2 % — HIGH (ref 10.3–14.5)
SODIUM SERPL-SCNC: 137 MMOL/L — SIGNIFICANT CHANGE UP (ref 135–145)
TROPONIN I, HIGH SENSITIVITY RESULT: 18.8 NG/L — SIGNIFICANT CHANGE UP
WBC # BLD: 5.74 K/UL — SIGNIFICANT CHANGE UP (ref 3.8–10.5)
WBC # FLD AUTO: 5.74 K/UL — SIGNIFICANT CHANGE UP (ref 3.8–10.5)

## 2023-08-12 PROCEDURE — 73562 X-RAY EXAM OF KNEE 3: CPT | Mod: 26,RT

## 2023-08-12 PROCEDURE — 99232 SBSQ HOSP IP/OBS MODERATE 35: CPT

## 2023-08-12 PROCEDURE — 93010 ELECTROCARDIOGRAM REPORT: CPT

## 2023-08-12 RX ORDER — ONDANSETRON 8 MG/1
4 TABLET, FILM COATED ORAL EVERY 6 HOURS
Refills: 0 | Status: DISCONTINUED | OUTPATIENT
Start: 2023-08-12 | End: 2023-08-28

## 2023-08-12 RX ORDER — SODIUM BICARBONATE 1 MEQ/ML
650 SYRINGE (ML) INTRAVENOUS THREE TIMES A DAY
Refills: 0 | Status: DISCONTINUED | OUTPATIENT
Start: 2023-08-12 | End: 2023-08-28

## 2023-08-12 RX ORDER — COLCHICINE 0.6 MG
0.3 TABLET ORAL DAILY
Refills: 0 | Status: DISCONTINUED | OUTPATIENT
Start: 2023-08-12 | End: 2023-08-28

## 2023-08-12 RX ORDER — COLCHICINE 0.6 MG
0.6 TABLET ORAL DAILY
Refills: 0 | Status: DISCONTINUED | OUTPATIENT
Start: 2023-08-12 | End: 2023-08-12

## 2023-08-12 RX ADMIN — PANTOPRAZOLE SODIUM 40 MILLIGRAM(S): 20 TABLET, DELAYED RELEASE ORAL at 17:47

## 2023-08-12 RX ADMIN — Medication 1 MILLIGRAM(S): at 12:01

## 2023-08-12 RX ADMIN — PANTOPRAZOLE SODIUM 40 MILLIGRAM(S): 20 TABLET, DELAYED RELEASE ORAL at 06:07

## 2023-08-12 RX ADMIN — ONDANSETRON 4 MILLIGRAM(S): 8 TABLET, FILM COATED ORAL at 09:04

## 2023-08-12 RX ADMIN — Medication 0.3 MILLIGRAM(S): at 12:01

## 2023-08-12 RX ADMIN — Medication 650 MILLIGRAM(S): at 04:15

## 2023-08-12 RX ADMIN — Medication 650 MILLIGRAM(S): at 03:29

## 2023-08-12 RX ADMIN — Medication 650 MILLIGRAM(S): at 22:18

## 2023-08-12 NOTE — PROGRESS NOTE ADULT - SUBJECTIVE AND OBJECTIVE BOX
Cardiology Progress Note    Interval Events:  still with melena      MEDICATIONS:  albuterol    90 MICROgram(s) HFA Inhaler 2 Puff(s) Inhalation every 6 hours PRN  budesonide 160 MICROgram(s)/formoterol 4.5 MICROgram(s) Inhaler 2 Puff(s) Inhalation two times a day  acetaminophen     Tablet .. 650 milliGRAM(s) Oral every 6 hours PRN  ondansetron Injectable 4 milliGRAM(s) IntraMuscular every 6 hours PRN  pantoprazole  Injectable 40 milliGRAM(s) IV Push every 12 hours  colchicine 0.3 milliGRAM(s) Oral daily  folic acid 1 milliGRAM(s) Oral daily  sodium bicarbonate 650 milliGRAM(s) Oral three times a day      PHYSICAL EXAM:  T(C): 36.6 (08-12-23 @ 10:45), Max: 37 (08-12-23 @ 04:28)  HR: 91 (08-12-23 @ 10:45) (64 - 91)  BP: 117/63 (08-12-23 @ 10:45) (104/67 - 123/65)  RR: 18 (08-12-23 @ 10:45) (18 - 18)  SpO2: 90% (08-12-23 @ 10:45) (90% - 100%)  Wt(kg): --  I&O's Summary    11 Aug 2023 07:01  -  12 Aug 2023 07:00  --------------------------------------------------------  IN: 1355 mL / OUT: 200 mL / NET: 1155 mL    Appearance: No acute distress  HEENT:   mmm  Cardiovascular: Normal S1 S2, no elevated JVP,no edema  Respiratory: Lungs clear to auscultation, good air movement  Psychiatry: awake, alert  Gastrointestinal:  soft nt  Skin: No rashes, no ecchymoses, no cyanosis	  Neurologic: grossly non-focal  Extremities: Normal range of motion, no clubbing, cyanosis or edema  Vascular: Peripheral pulses palpable bilaterally      LABS:	 	  CBC Full  -  ( 12 Aug 2023 09:45 )  WBC Count : 5.74 K/uL  Hemoglobin : 8.0 g/dL  Hematocrit : 25.7 %  Platelet Count - Automated : 166 K/uL    08-12  137  |  113<H>  |  26<H>  ----------------------------<  100<H>  4.2   |  18<L>  |  3.17<H>  08-11    137  |  114<H>  |  30<H>  ----------------------------<  88  3.9   |  16<L>  |  3.19<H>    Ca    7.5<L>      12 Aug 2023 09:45  Ca    7.4<L>      11 Aug 2023 18:05  Phos  3.2     08-12  Mg     1.5     08-12        proBNP:   Lipid Profile:   HgA1c:   TSH:     CARDIAC MARKERS:  Troponin I, High Sensitivity Result: 18.8 ng/L (08-12-23 @ 09:45)  Troponin I, High Sensitivity Result: 81.5 ng/L (08-09-23 @ 16:06)    TELEMETRY: 	  SR  ECG:  	  RADIOLOGY:  OTHER: 	    PREVIOUS DIAGNOSTIC TESTING:    [ ] Echocardiogram:   [ ] Catheterization:  [ ] Stress Test:

## 2023-08-12 NOTE — PROGRESS NOTE ADULT - SUBJECTIVE AND OBJECTIVE BOX
HOSPITALIST ATTENDING PROGRESS NOTE    Cc: Follow up for GIB, renal failure    HPI: one episode of vomiting and small soft dark BM yesterday. Lower abdominal discomfort. Overall feels better. Tolerating clears    EXAM  Vitals: Vital Signs Last 24 Hrs  T(C): 36.6 (12 Aug 2023 10:45), Max: 37 (12 Aug 2023 04:28)  T(F): 97.8 (12 Aug 2023 10:45), Max: 98.6 (12 Aug 2023 04:28)  HR: 91 (12 Aug 2023 10:45) (64 - 91)  BP: 117/63 (12 Aug 2023 10:45) (104/67 - 123/65)  BP(mean): --  RR: 18 (12 Aug 2023 10:45) (18 - 18)  SpO2: 90% (12 Aug 2023 10:45) (90% - 100%)  Gen: NAD, comfortable  HEENT: normocephalic, atraumatic, no nasal discharge, trachea midline, anicteric sclerae  CVS: Normal S1S2, irreg/irreg, no JVD  RESP: Clear to auscultation bilaterally, no wheezing, no rhonchi, normal respiratory effort  ABD: normal bowel sounds, non-tender, non-distended, no organomegaly  SKIN: no rash seen on visible skin  EXT: trace edema of legs.     MEDS  MEDICATIONS  (STANDING):  budesonide 160 MICROgram(s)/formoterol 4.5 MICROgram(s) Inhaler 2 Puff(s) Inhalation two times a day  colchicine 0.3 milliGRAM(s) Oral daily  folic acid 1 milliGRAM(s) Oral daily  pantoprazole  Injectable 40 milliGRAM(s) IV Push every 12 hours  sodium bicarbonate 650 milliGRAM(s) Oral three times a day    MEDICATIONS  (PRN):  acetaminophen     Tablet .. 650 milliGRAM(s) Oral every 6 hours PRN Temp greater or equal to 38C (100.4F), Mild Pain (1 - 3)  albuterol    90 MICROgram(s) HFA Inhaler 2 Puff(s) Inhalation every 6 hours PRN Shortness of Breath and/or Wheezing  ondansetron Injectable 4 milliGRAM(s) IntraMuscular every 6 hours PRN Nausea and/or Vomiting    LABS/RADIOLOGY                          8.0    5.74  )-----------( 166      ( 12 Aug 2023 09:45 )             25.7    08-12    137  |  113<H>  |  26<H>  ----------------------------<  100<H>  4.2   |  18<L>  |  3.17<H>    Ca    7.5<L>      12 Aug 2023 09:45  Phos  3.2     08-12  Mg     1.5     08-12      CAPILLARY BLOOD GLUCOSE            ASSESMENT/PLAN  68 year old F hx of HTN, gout, hypothyroidism, L. DVT, atrial fibrillation, COPD is on NC at home from exposure [place] , SONU on CPAP, chronic anemia who presented w/ dark stools for 3-4 days.    #Melena/GIB - Hb stable.  Transfuse to maintain Hgb > 8.  Cot IV PPI.  As per GI- severe metabolic acidosis needs to be resolved before patient can undergo EGD, hopefully this monday. Cardiology and Pulm recs appreciated.   # VY/CKD - Cr 3.2, Cr 2-2.5 in 2022, metabolic acidosis.    -acidosis slowly improving with bicarb drip. Continue. Nephrology f/u   # Chronic Respiratory Failure with hypoxia, COPD - CPAP, O2. stable/at baseline.   # LE DVT - holding AC in setting of melena.  LE dopplers----no DVT in either extremity  #Large right knee joint effusion and small right popliteal fossa Baker's cyst measure --likely from severe OA. Seen by ortho: no intervention. Pain meds as needed. Patient walks with a walker/cane at baseline  # Chronic Atrial fibrillation - rate controlled, holding AC in setting of GI bleed  # ? CHF TTE 5/2023 LV could not be well visualized.  TTE -- 55 to   60%.    # Inpatient DVT Prophylaxis - ICDs

## 2023-08-12 NOTE — PROGRESS NOTE ADULT - SUBJECTIVE AND OBJECTIVE BOX
NEPHROLOGY PROGRESS NOTE    CHIEF COMPLAINT:  VY/CKD    HPI:  Renal function about same.  Mild "gout" pain in right foot.    EXAM:  Vital Signs Last 24 Hrs  T(C): 36.6 (12 Aug 2023 10:45), Max: 37 (12 Aug 2023 04:28)  T(F): 97.8 (12 Aug 2023 10:45), Max: 98.6 (12 Aug 2023 04:28)  HR: 91 (12 Aug 2023 10:45) (64 - 91)  BP: 117/63 (12 Aug 2023 10:45) (104/67 - 123/65)  BP(mean): --  RR: 18 (12 Aug 2023 10:45) (18 - 18)  SpO2: 90% (12 Aug 2023 10:45) (90% - 100%)    Parameters below as of 12 Aug 2023 10:45  Patient On (Oxygen Delivery Method): nasal cannula      I&O's Summary    11 Aug 2023 07:01  -  12 Aug 2023 07:00  --------------------------------------------------------  IN: 1355 mL / OUT: 200 mL / NET: 1155 mL    Conversant, in no apparent distress  Normal respiratory effort, lungs clear bilaterally  Heart RRR with no murmur, no peripheral edema    LABS                        8.0    5.74  )-----------( 166      ( 12 Aug 2023 09:45 )             25.7     08-12    137  |  113<H>  |  26<H>  ----------------------------<  100<H>  4.2   |  18<L>  |  3.17<H>    Ca    7.5<L>      12 Aug 2023 09:45  Phos  3.2     08-12  Mg     1.5     08-12    Lactic Acid 1.2    Urinalysis Basic - ( 12 Aug 2023 09:45 )  Color: x / Appearance: x / SG: x / pH: x  Gluc: 100 mg/dL / Ketone: x  / Bili: x / Urobili: x   Blood: x / Protein: x / Nitrite: x   Leuk Esterase: x / RBC: x / WBC x   Sq Epi: x / Non Sq Epi: x / Bacteria: x      Impression  VY CKD 3-4; pre renal azotemia, risk for ATN related to acute anemia   Severe metabolic acidosis; gap/ non gap, better    Plan  Can dc IVF  NaHCO3 650 mg PO TID  Avoid nephrotoxins  Daily BMP  Check serum urate

## 2023-08-12 NOTE — PROGRESS NOTE ADULT - ASSESSMENT
68F HTN, hypothyroidism, Afib on Eliquis, COPD on O2, SONU who presented with several days of melena and LOMAX with acute on chronic anemia.    Pt has chest pressure and LOMAX, likely due to anemia vs. intra-abdominal process.  Troponin minimally elevated in the setting of elevated SCr.  Recent cardiac catheterization with no evidence of CAD  awaiting EGD pending metabolic derangements    Pt is without evidence of active cardiac ischemia, uncontrolled arrhythmia, or decompensated heart failure.  No cardiac contraindication to proceeding with endoscopic evaluation.

## 2023-08-13 LAB
ANION GAP SERPL CALC-SCNC: 8 MMOL/L — SIGNIFICANT CHANGE UP (ref 5–17)
BUN SERPL-MCNC: 24 MG/DL — HIGH (ref 7–23)
CALCIUM SERPL-MCNC: 7.4 MG/DL — LOW (ref 8.5–10.1)
CHLORIDE SERPL-SCNC: 113 MMOL/L — HIGH (ref 96–108)
CO2 SERPL-SCNC: 17 MMOL/L — LOW (ref 22–31)
CREAT SERPL-MCNC: 3.01 MG/DL — HIGH (ref 0.5–1.3)
EGFR: 17 ML/MIN/1.73M2 — LOW
GLUCOSE SERPL-MCNC: 88 MG/DL — SIGNIFICANT CHANGE UP (ref 70–99)
HCT VFR BLD CALC: 25.3 % — LOW (ref 34.5–45)
HGB BLD-MCNC: 8 G/DL — LOW (ref 11.5–15.5)
MAGNESIUM SERPL-MCNC: 1.6 MG/DL — SIGNIFICANT CHANGE UP (ref 1.6–2.6)
MCHC RBC-ENTMCNC: 31.6 G/DL — LOW (ref 32–36)
MCHC RBC-ENTMCNC: 32.9 PG — SIGNIFICANT CHANGE UP (ref 27–34)
MCV RBC AUTO: 104.1 FL — HIGH (ref 80–100)
NRBC # BLD: 0 /100 WBCS — SIGNIFICANT CHANGE UP (ref 0–0)
PLATELET # BLD AUTO: 161 K/UL — SIGNIFICANT CHANGE UP (ref 150–400)
POTASSIUM SERPL-MCNC: 4.4 MMOL/L — SIGNIFICANT CHANGE UP (ref 3.5–5.3)
POTASSIUM SERPL-SCNC: 4.4 MMOL/L — SIGNIFICANT CHANGE UP (ref 3.5–5.3)
RBC # BLD: 2.43 M/UL — LOW (ref 3.8–5.2)
RBC # FLD: 17.4 % — HIGH (ref 10.3–14.5)
SODIUM SERPL-SCNC: 138 MMOL/L — SIGNIFICANT CHANGE UP (ref 135–145)
URATE SERPL-MCNC: 8.2 MG/DL — HIGH (ref 2.5–7)
WBC # BLD: 5.85 K/UL — SIGNIFICANT CHANGE UP (ref 3.8–10.5)
WBC # FLD AUTO: 5.85 K/UL — SIGNIFICANT CHANGE UP (ref 3.8–10.5)

## 2023-08-13 PROCEDURE — 99232 SBSQ HOSP IP/OBS MODERATE 35: CPT

## 2023-08-13 RX ORDER — MAGNESIUM SULFATE 500 MG/ML
2 VIAL (ML) INJECTION ONCE
Refills: 0 | Status: COMPLETED | OUTPATIENT
Start: 2023-08-13 | End: 2023-08-13

## 2023-08-13 RX ORDER — SODIUM CHLORIDE 9 MG/ML
1000 INJECTION, SOLUTION INTRAVENOUS
Refills: 0 | Status: DISCONTINUED | OUTPATIENT
Start: 2023-08-13 | End: 2023-08-17

## 2023-08-13 RX ADMIN — Medication 650 MILLIGRAM(S): at 06:24

## 2023-08-13 RX ADMIN — SODIUM CHLORIDE 75 MILLILITER(S): 9 INJECTION, SOLUTION INTRAVENOUS at 15:05

## 2023-08-13 RX ADMIN — Medication 650 MILLIGRAM(S): at 21:32

## 2023-08-13 RX ADMIN — ONDANSETRON 4 MILLIGRAM(S): 8 TABLET, FILM COATED ORAL at 17:41

## 2023-08-13 RX ADMIN — Medication 1 MILLIGRAM(S): at 11:35

## 2023-08-13 RX ADMIN — PANTOPRAZOLE SODIUM 40 MILLIGRAM(S): 20 TABLET, DELAYED RELEASE ORAL at 17:12

## 2023-08-13 RX ADMIN — Medication 650 MILLIGRAM(S): at 15:06

## 2023-08-13 RX ADMIN — Medication 0.3 MILLIGRAM(S): at 11:35

## 2023-08-13 RX ADMIN — PANTOPRAZOLE SODIUM 40 MILLIGRAM(S): 20 TABLET, DELAYED RELEASE ORAL at 06:24

## 2023-08-13 RX ADMIN — Medication 25 GRAM(S): at 15:07

## 2023-08-13 NOTE — PROGRESS NOTE ADULT - SUBJECTIVE AND OBJECTIVE BOX
NEPHROLOGY PROGRESS NOTE    CHIEF COMPLAINT:  VY/CKD    HPI:  She is c/o alot of cramps in her hands.  Right foot pain persists as well despite colchicine.    EXAM:  Vital Signs Last 24 Hrs  T(C): 36.3 (13 Aug 2023 10:53), Max: 37.2 (13 Aug 2023 04:57)  T(F): 97.3 (13 Aug 2023 10:53), Max: 99 (13 Aug 2023 04:57)  HR: 68 (13 Aug 2023 10:53) (65 - 76)  BP: 98/65 (13 Aug 2023 10:53) (97/60 - 124/63)  BP(mean): --  RR: 18 (13 Aug 2023 10:53) (18 - 18)  SpO2: 100% (13 Aug 2023 10:53) (96% - 100%)    Parameters below as of 13 Aug 2023 10:53  Patient On (Oxygen Delivery Method): nasal cannula      I&O's Summary    12 Aug 2023 07:01  -  13 Aug 2023 07:00  --------------------------------------------------------  IN: 0 mL / OUT: 500 mL / NET: -500 mL      Conversant, in no apparent distress  Normal respiratory effort, lungs clear bilaterally  Heart RRR with no murmur, no peripheral edema    LABS                        8.0    5.85  )-----------( 161      ( 13 Aug 2023 06:05 )             25.3     08-13    138  |  113<H>  |  24<H>  ----------------------------<  88  4.4   |  17<L>  |  3.01<H>    Ca    7.4<L>      13 Aug 2023 06:05  Phos  3.2     08-12  Mg     1.6     08-13  UA 8.2    Urinalysis Basic - ( 13 Aug 2023 06:05 )  Color: x / Appearance: x / SG: x / pH: x  Gluc: 88 mg/dL / Ketone: x  / Bili: x / Urobili: x   Blood: x / Protein: x / Nitrite: x   Leuk Esterase: x / RBC: x / WBC x   Sq Epi: x / Non Sq Epi: x / Bacteria: x        Impression  VY CKD 3-4; pre renal azotemia, risk for ATN related to acute anemia   Severe metabolic acidosis; gap/ non gap, better  Gout/hyperuricemia    Plan  Rehydrate today given muscle cramping  Magnesium sulfate 2 grams IVPB x 1  NaHCO3 650 mg PO TID  Continue colchicine;  can add allopurinol 100 mg PO QD

## 2023-08-13 NOTE — PROGRESS NOTE ADULT - SUBJECTIVE AND OBJECTIVE BOX
HOSPITALIST ATTENDING PROGRESS NOTE    Cc: Follow up for GI bleed, renal failure, acidosis    HPI: c/o some leg cramps. Had lightheadedness this AM in the setting  of low BP     EXAM  Vitals: Vital Signs Last 24 Hrs  T(C): 36.3 (13 Aug 2023 10:53), Max: 37.2 (13 Aug 2023 04:57)  T(F): 97.3 (13 Aug 2023 10:53), Max: 99 (13 Aug 2023 04:57)  HR: 68 (13 Aug 2023 10:53) (65 - 76)  BP: 98/65 (13 Aug 2023 10:53) (97/60 - 124/63)  BP(mean): --  RR: 18 (13 Aug 2023 10:53) (18 - 18)  SpO2: 100% (13 Aug 2023 10:53) (96% - 100%)  Gen: NAD, comfortable  HEENT: normocephalic, atraumatic, no nasal discharge, trachea midline, anicteric sclerae  CVS: Normal S1S2, RRR, no JVD  RESP: Clear to auscultation bilaterally, no wheezing, no rhonchi, normal respiratory effort  ABD: normal bowel sounds, non-tender, non-distended, no organomegaly  SKIN: no rash seen on visible skin  EXT: no edema    MEDS  MEDICATIONS  (STANDING):  budesonide 160 MICROgram(s)/formoterol 4.5 MICROgram(s) Inhaler 2 Puff(s) Inhalation two times a day  colchicine 0.3 milliGRAM(s) Oral daily  folic acid 1 milliGRAM(s) Oral daily  magnesium sulfate  IVPB 2 Gram(s) IV Intermittent once  pantoprazole  Injectable 40 milliGRAM(s) IV Push every 12 hours  sodium bicarbonate 650 milliGRAM(s) Oral three times a day  sodium chloride 0.45%. 1000 milliLiter(s) (75 mL/Hr) IV Continuous <Continuous>    MEDICATIONS  (PRN):  acetaminophen     Tablet .. 650 milliGRAM(s) Oral every 6 hours PRN Temp greater or equal to 38C (100.4F), Mild Pain (1 - 3)  albuterol    90 MICROgram(s) HFA Inhaler 2 Puff(s) Inhalation every 6 hours PRN Shortness of Breath and/or Wheezing  ondansetron Injectable 4 milliGRAM(s) IntraMuscular every 6 hours PRN Nausea and/or Vomiting    LABS/RADIOLOGY                          8.0    5.85  )-----------( 161      ( 13 Aug 2023 06:05 )             25.3    08-13    138  |  113<H>  |  24<H>  ----------------------------<  88  4.4   |  17<L>  |  3.01<H>    Ca    7.4<L>      13 Aug 2023 06:05  Phos  3.2     08-12  Mg     1.6     08-13      CAPILLARY BLOOD GLUCOSE    X-ray right knee:   No fracture or dislocation.  Small suprapatellar joint effusion  Moderate to severe tricompartmental degenerative changes and narrowing.  Moderate atherosclerotic change.          ASSESMENT/PLAN    68 year old F hx of HTN, gout, hypothyroidism, L. DVT, atrial fibrillation, COPD is on NC at home from exposure [place] , SONU on CPAP, chronic anemia who presented w/ dark stools for 3-4 days.    #Melena/GIB - Hb stable.  Transfuse to maintain Hgb > 8.  Cot IV PPI.  As per GI- severe metabolic acidosis needs to be resolved before patient can undergo EGD, hopefully this monday. Cardiology and Pulm recs appreciated.   # VY/CKD - Cr 3.2, Cr 2-2.5 in 2022, metabolic acidosis.    -acidosis slowly improving with bicarb drip, and now with oral bicarb. Continue. Nephrology following   # Chronic Respiratory Failure with hypoxia, COPD - CPAP, O2. stable/at baseline.   # LE DVT - holding AC in setting of melena.  LE dopplers----no DVT in either extremity  #Small right knee joint effusion and small right popliteal fossa Baker's cyst measure --likely from severe OA. Seen by ortho: no intervention. Pain meds as needed. Patient walks with a walker/cane at baseline  # Chronic Atrial fibrillation - rate controlled, holding AC in setting of GI bleed  # ? CHF TTE 5/2023 LV could not be well visualized.  TTE -- 55 to   60%.    # Inpatient DVT Prophylaxis - ICDs

## 2023-08-13 NOTE — PROGRESS NOTE ADULT - SUBJECTIVE AND OBJECTIVE BOX
24H hour events:   pt resting  comfortable appearing  denies cp sob  states dark stool o/n  MEDICATIONS:      albuterol    90 MICROgram(s) HFA Inhaler 2 Puff(s) Inhalation every 6 hours PRN  budesonide 160 MICROgram(s)/formoterol 4.5 MICROgram(s) Inhaler 2 Puff(s) Inhalation two times a day    acetaminophen     Tablet .. 650 milliGRAM(s) Oral every 6 hours PRN  ondansetron Injectable 4 milliGRAM(s) IntraMuscular every 6 hours PRN    pantoprazole  Injectable 40 milliGRAM(s) IV Push every 12 hours    colchicine 0.3 milliGRAM(s) Oral daily    folic acid 1 milliGRAM(s) Oral daily  sodium bicarbonate 650 milliGRAM(s) Oral three times a day          PHYSICAL EXAM:  T(C): 37.2 (08-13-23 @ 04:57), Max: 37.2 (08-13-23 @ 04:57)  HR: 76 (08-13-23 @ 09:37) (65 - 91)  BP: 97/60 (08-13-23 @ 09:37) (97/60 - 124/63)  RR: 18 (08-13-23 @ 09:37) (18 - 18)  SpO2: 100% (08-13-23 @ 09:37) (90% - 100%)  Wt(kg): --  I&O's Summary    12 Aug 2023 07:01  -  13 Aug 2023 07:00  --------------------------------------------------------  IN: 0 mL / OUT: 500 mL / NET: -500 mL        Appearance: Normal	  HEENT:   Normal oral mucosa, PERRL, EOMI	  Lymphatic: No lymphadenopathy  Cardiovascular: Normal S1 S2, No JVD, No murmurs, No edema  Respiratory: Lungs clear to auscultation	  Psychiatry: A & O x 3, Mood & affect appropriate  Gastrointestinal:  Soft, Non-tender, + BS	  Skin: No rashes, No ecchymoses, No cyanosis	  Neurologic: Non-focal  Extremities: Normal range of motion, No clubbing, cyanosis       LABS:	 	    CBC Full  -  ( 13 Aug 2023 06:05 )  WBC Count : 5.85 K/uL  Hemoglobin : 8.0 g/dL  Hematocrit : 25.3 %  Platelet Count - Automated : 161 K/uL  Mean Cell Volume : 104.1 fl  Mean Cell Hemoglobin : 32.9 pg  Mean Cell Hemoglobin Concentration : 31.6 g/dL  Auto Neutrophil # : x  Auto Lymphocyte # : x  Auto Monocyte # : x  Auto Eosinophil # : x  Auto Basophil # : x  Auto Neutrophil % : x  Auto Lymphocyte % : x  Auto Monocyte % : x  Auto Eosinophil % : x  Auto Basophil % : x    08-13    138  |  113<H>  |  24<H>  ----------------------------<  88  4.4   |  17<L>  |  3.01<H>  08-12    137  |  113<H>  |  26<H>  ----------------------------<  100<H>  4.2   |  18<L>  |  3.17<H>    Ca    7.4<L>      13 Aug 2023 06:05  Ca    7.5<L>      12 Aug 2023 09:45  Phos  3.2     08-12  Mg     1.6     08-13  Mg     1.5     08-12        proBNP:   Lipid Profile:   HgA1c:   TSH:       CARDIAC MARKERS:            TELEMETRY: 	    ECG:  	  RADIOLOGY:  OTHER: 	    PREVIOUS DIAGNOSTIC TESTING:    [ ] Echocardiogram:  [ ]  Catheterization:  [ ] Stress Test:  	  	  ASSESSMENT/PLAN:

## 2023-08-13 NOTE — PROGRESS NOTE ADULT - ASSESSMENT
68F HTN, hypothyroidism, Afib on Eliquis, COPD on O2, SONU who presented with several days of melena and LOMAX with acute on chronic anemia.    initial symptoms of chest pressure and LOMAX, likely due to anemia vs. intra-abdominal process. all symptoms currently resolved.   pt is comfortable appearing  Recent cardiac catheterization with no evidence of CAD  awaiting EGD pending metabolic derangements  see note from 8/12/23 for complete pre op comments, pt optimized for endoscopic eval

## 2023-08-14 LAB
ANION GAP SERPL CALC-SCNC: 5 MMOL/L — SIGNIFICANT CHANGE UP (ref 5–17)
BUN SERPL-MCNC: 24 MG/DL — HIGH (ref 7–23)
CALCIUM SERPL-MCNC: 7.7 MG/DL — LOW (ref 8.5–10.1)
CHLORIDE SERPL-SCNC: 111 MMOL/L — HIGH (ref 96–108)
CO2 SERPL-SCNC: 20 MMOL/L — LOW (ref 22–31)
CREAT SERPL-MCNC: 2.99 MG/DL — HIGH (ref 0.5–1.3)
EGFR: 17 ML/MIN/1.73M2 — LOW
GLUCOSE SERPL-MCNC: 94 MG/DL — SIGNIFICANT CHANGE UP (ref 70–99)
HCT VFR BLD CALC: 25 % — LOW (ref 34.5–45)
HGB BLD-MCNC: 7.7 G/DL — LOW (ref 11.5–15.5)
MCHC RBC-ENTMCNC: 30.8 G/DL — LOW (ref 32–36)
MCHC RBC-ENTMCNC: 32.2 PG — SIGNIFICANT CHANGE UP (ref 27–34)
MCV RBC AUTO: 104.6 FL — HIGH (ref 80–100)
NRBC # BLD: 0 /100 WBCS — SIGNIFICANT CHANGE UP (ref 0–0)
PLATELET # BLD AUTO: 155 K/UL — SIGNIFICANT CHANGE UP (ref 150–400)
POTASSIUM SERPL-MCNC: 4.5 MMOL/L — SIGNIFICANT CHANGE UP (ref 3.5–5.3)
POTASSIUM SERPL-SCNC: 4.5 MMOL/L — SIGNIFICANT CHANGE UP (ref 3.5–5.3)
RBC # BLD: 2.39 M/UL — LOW (ref 3.8–5.2)
RBC # FLD: 16.8 % — HIGH (ref 10.3–14.5)
SODIUM SERPL-SCNC: 136 MMOL/L — SIGNIFICANT CHANGE UP (ref 135–145)
WBC # BLD: 5.55 K/UL — SIGNIFICANT CHANGE UP (ref 3.8–10.5)
WBC # FLD AUTO: 5.55 K/UL — SIGNIFICANT CHANGE UP (ref 3.8–10.5)

## 2023-08-14 PROCEDURE — 99232 SBSQ HOSP IP/OBS MODERATE 35: CPT

## 2023-08-14 PROCEDURE — 99233 SBSQ HOSP IP/OBS HIGH 50: CPT

## 2023-08-14 RX ORDER — TIOTROPIUM BROMIDE 18 UG/1
2 CAPSULE ORAL; RESPIRATORY (INHALATION) DAILY
Refills: 0 | Status: DISCONTINUED | OUTPATIENT
Start: 2023-08-14 | End: 2023-08-28

## 2023-08-14 RX ADMIN — Medication 650 MILLIGRAM(S): at 13:06

## 2023-08-14 RX ADMIN — Medication 0.3 MILLIGRAM(S): at 13:05

## 2023-08-14 RX ADMIN — Medication 650 MILLIGRAM(S): at 06:04

## 2023-08-14 RX ADMIN — Medication 40 MILLIGRAM(S): at 18:59

## 2023-08-14 RX ADMIN — Medication 1 MILLIGRAM(S): at 13:06

## 2023-08-14 RX ADMIN — Medication 650 MILLIGRAM(S): at 22:22

## 2023-08-14 RX ADMIN — PANTOPRAZOLE SODIUM 40 MILLIGRAM(S): 20 TABLET, DELAYED RELEASE ORAL at 06:04

## 2023-08-14 RX ADMIN — SODIUM CHLORIDE 75 MILLILITER(S): 9 INJECTION, SOLUTION INTRAVENOUS at 22:23

## 2023-08-14 NOTE — PROGRESS NOTE ADULT - ASSESSMENT
68F HTN, hypothyroidism, Afib on Eliquis, COPD on O2, SONU who presented with several days of melena and LOMAX with acute on chronic anemia.    initial symptoms of chest pressure and LOMAX, likely due to anemia vs. intra-abdominal process. all symptoms currently resolved.   pt is currently with c/o Rt foot pain, probably from gout, continued on colchicine renal dose  Recent cardiac catheterization with no evidence of CAD  awaiting EGD pending metabolic derangements  see note from 8/12/23 for complete pre op comments   68F HTN, hypothyroidism, Afib on Eliquis, COPD on O2, SONU who presented with several days of melena and LOMAX with acute on chronic anemia.    initial symptoms of chest pressure and LOMAX, likely due to anemia vs. intra-abdominal process. all symptoms currently resolved.   pt is currently with c/o Rt foot pain, probably from gout, continued on colchicine renal dose, further management as per primary team   Recent cardiac catheterization with no evidence of CAD  awaiting EGD pending metabolic derangements  see note from 8/12/23 for complete pre op comments

## 2023-08-14 NOTE — PROGRESS NOTE ADULT - ASSESSMENT
68F HTN, gout, hypothyroidism, L. DVT, atrial fibrillation, COPD on home O[place] ([place] ), SONU on CPAP, obesity s/p gastric sleeve, chronic anemia here with melena, acute on chronic anemia. Seen by GI with plans for endoscopy. Requesting pulmonary clearance. Found to have severe metabolic acidosis pH 7.1, HCO3: 10. Hospital course with acute gout flare.    DX: GI bleed, anemia due to blood loss, melena, metabolic acidosis, , VY on CKD, COPD with oxygen dependence, chronic hypoxic respiratory failure, SONU on CPAP, obesity, gout    - cont bronchodilators for underlying COPD: on home trelegy, will give hospital equivalent (symbicort + spiriva), albuterol prn  - cont nocturnal CPAP for sleep apnea encouraged compliance  - on 2.5L NC O2 supplementation (baseline uses 3L at home)  - maintain O2 sat in the low 90s  - lung exam clear  - she has hx of reintubation post gastric sleeve secondary to hypoxia and was successfully extubated the following day  - review of admission VBG with pH 7.1, CO2: 29, Po2: 56, HCO3: 10  - she has severe metabolic acidosis with compensatory respiratory alkalosis  - ABG done confirming persistent metabolic acidosis  - s/p bicarb drip transitioned to po bicarb  - Cr elevated compared to prior labs, VY on CKD now improving  - pt reports she had multiple bouts of BM with melena prior to coming to hospital, likely component of dehydration with VY and metabolic acidosis from bicarb loss with frequent stooling.   - repeat VBG in AM  - bicarb on chemistry improved  - if acidosis resolved can proceed with planned EGD, she is optimized from respiratory standpoint  - SOB improved. Her SOB likely multifactorial: underlying COPD, acute on chronic anemia, with some SOB from respiratory compensation of metabolic acidosis   - ?gout flare: on cholichicine and allopurinol. will start systemic steroids and avoid NSAIDs with recent VY and melena. prednisone 40mg daily x5 days  - monitor for further bleeding and H/H trend: s/p 1 unit pRBC 8/10  - GI follow up for EGD  - renal follow up   68F HTN, gout, hypothyroidism, L. DVT, atrial fibrillation, COPD on home O[place] ([place] ), SONU on CPAP, obesity s/p gastric sleeve, chronic anemia here with melena, acute on chronic anemia. Seen by GI with plans for endoscopy. Requesting pulmonary clearance. Found to have severe metabolic acidosis pH 7.1, HCO3: 10. Hospital course with acute gout flare.    DX: GI bleed, anemia due to blood loss, melena, metabolic acidosis, , VY on CKD, COPD with oxygen dependence, chronic hypoxic respiratory failure, SONU on CPAP, obesity, gout    - cont bronchodilators for underlying COPD: on home trelegy, will give hospital equivalent (symbicort + spiriva), albuterol prn  - cont nocturnal CPAP for sleep apnea encouraged compliance  - on 2.5L NC O2 supplementation (baseline uses 3L at home)  - maintain O2 sat in the low 90s  - lung exam clear  - she has hx of reintubation post gastric sleeve secondary to hypoxia and was successfully extubated the following day  - review of admission VBG with pH 7.1, CO2: 29, Po2: 56, HCO3: 10  - she has severe metabolic acidosis with compensatory respiratory alkalosis  - ABG done confirming persistent metabolic acidosis  - s/p bicarb drip transitioned to po bicarb  - Cr elevated compared to prior labs, VY on CKD now improving  - pt reports she had multiple bouts of BM with melena prior to coming to hospital, likely component of dehydration with VY and metabolic acidosis from bicarb loss with frequent stooling.   - repeat VBG in AM  - bicarb on chemistry improved  - if acidosis resolved can proceed with planned EGD, she is optimized from respiratory standpoint  - SOB improved. Her SOB likely multifactorial: underlying COPD, acute on chronic anemia, with some SOB from respiratory compensation of metabolic acidosis   - ?gout flare: on colchicine. will start systemic steroids and avoid NSAIDs with recent VY and melena. prednisone 40mg daily x5 days  - monitor for further bleeding and H/H trend: s/p 1 unit pRBC 8/10  - GI follow up for EGD  - renal follow up

## 2023-08-14 NOTE — PROGRESS NOTE ADULT - ASSESSMENT
68 year old F hx of HTN, gout, hypothyroidism, L. DVT, atrial fibrillation, COPD is on NC at home from exposure [place] , SONU on CPAP, chronic anemia who presented w/ dark stools for 3-4 days.    #Melena/GIB - Hb stable.  Transfuse to maintain Hgb > 8.  Cot IV PPI.  As per GI- severe metabolic acidosis needs to be resolved before patient can undergo EGD tomorrow. Cardiology and Pulm recs appreciated.   # VY/CKD - Cr 3.2, Cr 2-2.5 in 2022, metabolic acidosis.    -acidosis slowly improving with bicarb drip, and now with oral bicarb. Continue. Nephrology following   # Chronic Respiratory Failure with hypoxia, COPD - CPAP, O2. stable/at baseline.   # LE DVT - holding AC in setting of melena.  LE dopplers----no DVT in either extremity  #Small right knee joint effusion and small right popliteal fossa Baker's cyst measure --likely from severe OA. Seen by ortho: no intervention. Pain meds as needed. Patient walks with a walker/cane at baseline  # Chronic Atrial fibrillation - rate controlled, holding AC in setting of GI bleed  # ? CHF TTE 5/2023 LV could not be well visualized.  TTE -- 55 to   60%.  Gout- start prednisone    # Inpatient DVT Prophylaxis - ICDs   68 year old F w/ history of HTN, gout, hypothyroidism, L DVT, Paroxysmal atrial fibrillation, COPD is on 3L NC at home from exposure [place] , SONU on CPAP, chronic anemia who presented w/ dark stools for 3-4 days and was admitted w/ suspected UGIB.    UGIB w/ acute blood loss anemia  - CT abd showed few colonic diverticula without diverticulitis but no abnormal mass along the GI tract  - Hgb stable  - Transfuse to maintain Hgb > 7  - c/w folic acid  - c/w Protonix  - as per GI- severe metabolic acidosis needs to be resolved before patient can undergo EGD     VY on CKD IV  - Cr improving    Metabolic acidosis  - acidosis slowly improving with bicarb drip, and now with oral bicarb  - Nephrology following     Chronic Respiratory Failure with hypoxia due to COPD/ SONU on CPAP  - c/w CPAP & O2 via NC  - c/w Spiriva, Symbicort and Proair   - patient is stable/at baseline  - pulmonary following    Hx of LE DVT   - holding AC in setting of melena  - LE dopplers did not show DVT in either extremity    Right knee joint effusion and small right popliteal fossa Baker's cyst measure   - US showed a large right knee joint effusion with apparent internal complexity and a small right popliteal fossa Baker's cyst   - likely from severe OA  - as per ortho, no intervention    Paroxysmal Atrial fibrillation   - Echo showed EF 55 - 60% w/ grade I diastolic dysfunction w/ severe concentric left ventricular hypertrophy, moderate aortic regurgitation with borderline pulmonary hypertension  - rate controlled  - holding AC in setting of GI bleed    Gout  - start prednisone  - c/w colchicine     Prophylaxis:  DVT: ICDs  GI: Protonix

## 2023-08-14 NOTE — PROGRESS NOTE ADULT - SUBJECTIVE AND OBJECTIVE BOX
24 hr events:  no further melena  complaining of R foot pain secondary to gout  R lateral ankle swelling and R big toe pain  on 2.5L NC  no SOB  no cough  did not use nocturnal CPAP overnight  reports not sleeping well due to "roommate acting like a cat"  off bicarb drip on bicarb tablets  afebrile but reports being "hot"    ## ROS:  no fever, no chills  no HA, no dizziness  no visual changes, no auditory changes  no sore throat, no sinus congestion  no SOB, no cough  no chest pain, no palpitations  no abdominal pain, no N/V/D  no dysuria, no hematuria  no myalgias, n+ R ankle pain and R big toe pain  + ankle swelling  no rashes, no pruritis      ## Labs:  CBC:                        7.7    5.55  )-----------( 155      ( 14 Aug 2023 07:35 )             25.0     Chem:  08-14    136  |  111<H>  |  24<H>  ----------------------------<  94  4.5   |  20<L>  |  2.99<H>    Ca    7.7<L>      14 Aug 2023 07:35  Mg     1.6     08-13      Uric Acid (08.13.23 @ 06:05)    Uric Acid: 8.2 mg/dL    Blood Gas Profile - Arterial (08.11.23 @ 05:13)    pH, Arterial: 7.20   pCO2, Arterial: 25 mmHg   pO2, Arterial: 161 mmHg   HCO3, Arterial: 10 mmol/L   Base Excess, Arterial: -16.5 mmol/L   Oxygen Saturation, Arterial: 99.2 %   Total CO2, Arterial: 11 mmol/L   FIO2, Arterial: 32.0   Blood Gas Source Arterial: Arterial      ## Imaging:  < from: Xray Knee 3 Views, Right (08.12.23 @ 11:37) >  No fracture or dislocation.  Small suprapatellar joint effusion  Moderate to severe tricompartmental degenerative changes and narrowing.  Moderate atherosclerotic change.    -----------------------  < from: CT Neck Soft Tissue No Cont (08.10.23 @ 16:00) >  No cervical mass or adenopathy identified on this   noncontrast exam.    ---------------------  < from: CT Abdomen and Pelvis No Cont (08.09.23 @ 18:31) >  No bowel obstruction or inflammation.  Few colonic diverticula without diverticulitis. No abnormal mass along   the GI tract.    Cholelithiasis without evidence of cholecystitis.            ## Medications:  albuterol    90 MICROgram(s) HFA Inhaler 2 Puff(s) Inhalation every 6 hours PRN  budesonide 160 MICROgram(s)/formoterol 4.5 MICROgram(s) Inhaler 2 Puff(s) Inhalation two times a day  tiotropium 2.5 MICROgram(s) Inhaler 2 Puff(s) Inhalation daily    colchicine 0.3 milliGRAM(s) Oral daily    pantoprazole  Injectable 40 milliGRAM(s) IV Push every 12 hours    acetaminophen     Tablet .. 650 milliGRAM(s) Oral every 6 hours PRN  ondansetron Injectable 4 milliGRAM(s) IntraMuscular every 6 hours PRN      ## Vitals:  T(C): 36.6 (08-14-23 @ 16:33), Max: 37.1 (08-14-23 @ 04:36)  HR: 50 (08-14-23 @ 17:00) (50 - 71)  BP: 110/48 (08-14-23 @ 16:33) (101/63 - 111/62)  RR: 18 (08-14-23 @ 16:33) (18 - 18)  SpO2: 100% (08-14-23 @ 17:00) (98% - 100%)      08-13 @ 07:01  -  08-14 @ 07:00  --------------------------------------------------------  IN: 950 mL / OUT: 700 mL / NET: 250 mL        ## P/E:  Gen: obese female, lying comfortably in bed in no apparent distress  HEENT: NC/AT, EOMI, sclera white, mucus membranes moist  Resp: CTA B/L, no wheeze, no rhonchi  CVS: RRR  Abd: soft NT/ND +BS, protuberant belly  Ext: R lateral ankle edema, pain to palpation R 1st MTP, mild R knee effusion  Neuro: A&Ox3

## 2023-08-14 NOTE — PROGRESS NOTE ADULT - SUBJECTIVE AND OBJECTIVE BOX
Patient is a 68y old  Female who presents with a chief complaint of GI bleeding (14 Aug 2023 19:01)      INTERVAL HPI/OVERNIGHT EVENTS:    MEDICATIONS  (STANDING):  budesonide 160 MICROgram(s)/formoterol 4.5 MICROgram(s) Inhaler 2 Puff(s) Inhalation two times a day  colchicine 0.3 milliGRAM(s) Oral daily  folic acid 1 milliGRAM(s) Oral daily  pantoprazole  Injectable 40 milliGRAM(s) IV Push every 12 hours  predniSONE   Tablet 40 milliGRAM(s) Oral daily  sodium bicarbonate 650 milliGRAM(s) Oral three times a day  sodium chloride 0.45%. 1000 milliLiter(s) (75 mL/Hr) IV Continuous <Continuous>  tiotropium 2.5 MICROgram(s) Inhaler 2 Puff(s) Inhalation daily    MEDICATIONS  (PRN):  acetaminophen     Tablet .. 650 milliGRAM(s) Oral every 6 hours PRN Temp greater or equal to 38C (100.4F), Mild Pain (1 - 3)  albuterol    90 MICROgram(s) HFA Inhaler 2 Puff(s) Inhalation every 6 hours PRN Shortness of Breath and/or Wheezing  ondansetron Injectable 4 milliGRAM(s) IntraMuscular every 6 hours PRN Nausea and/or Vomiting      Allergies    No Known Allergies    Intolerances        Vital Signs Last 24 Hrs  T(C): 36.6 (14 Aug 2023 16:33), Max: 37.1 (14 Aug 2023 04:36)  T(F): 97.8 (14 Aug 2023 16:33), Max: 98.7 (14 Aug 2023 04:36)  HR: 50 (14 Aug 2023 17:00) (50 - 71)  BP: 110/48 (14 Aug 2023 16:33) (101/63 - 111/62)  BP(mean): --  RR: 18 (14 Aug 2023 16:33) (18 - 18)  SpO2: 100% (14 Aug 2023 17:00) (98% - 100%)    Parameters below as of 14 Aug 2023 16:33  Patient On (Oxygen Delivery Method): nasal cannula        PHYSICAL EXAM:  GENERAL: NAD   HEAD:  Atraumatic, Normocephalic  EYES: EOMI, PERRLA   NECK: Supple   NERVOUS SYSTEM:  Alert & Oriented X3, Good concentration   CHEST/LUNG: Clear to auscultation bilaterally; No rales, rhonchi, wheezing, or rubs  HEART: Regular rate and rhythm; No murmurs, rubs, or gallops  ABDOMEN: Soft, Nontender, Nondistended; Bowel sounds present  EXTREMITIES: bilateral foot tenderness, R>L       LABS:                        7.7    5.55  )-----------( 155      ( 14 Aug 2023 07:35 )             25.0     08-14    136  |  111<H>  |  24<H>  ----------------------------<  94  4.5   |  20<L>  |  2.99<H>    Ca    7.7<L>      14 Aug 2023 07:35  Mg     1.6     08-13        Urinalysis Basic - ( 14 Aug 2023 07:35 )    Color: x / Appearance: x / SG: x / pH: x  Gluc: 94 mg/dL / Ketone: x  / Bili: x / Urobili: x   Blood: x / Protein: x / Nitrite: x   Leuk Esterase: x / RBC: x / WBC x   Sq Epi: x / Non Sq Epi: x / Bacteria: x         RADIOLOGY & ADDITIONAL TESTS:    08-13-23 @ 07:01  -  08-14-23 @ 07:00  --------------------------------------------------------  IN:    IV PiggyBack: 50 mL    sodium chloride 0.45%: 900 mL  Total IN: 950 mL    OUT:    Voided (mL): 700 mL  Total OUT: 700 mL    Total NET: 250 mL      08-14-23 @ 07:01  -  08-14-23 @ 20:25  --------------------------------------------------------  IN:  Total IN: 0 mL    OUT:    Voided (mL): 300 mL  Total OUT: 300 mL    Total NET: -300 mL       68 year old F w/ history of HTN, gout, hypothyroidism, L DVT, Paroxysmal atrial fibrillation, COPD is on 3L NC at [place] exposure [place] , SONU on CPAP, chronic anemia who presented w/ dark stools for 3-4 days and was admitted w/ suspected UGIB. She is lying in bed in NAD.    MEDICATIONS  (STANDING):  budesonide 160 MICROgram(s)/formoterol 4.5 MICROgram(s) Inhaler 2 Puff(s) Inhalation two times a day  colchicine 0.3 milliGRAM(s) Oral daily  folic acid 1 milliGRAM(s) Oral daily  pantoprazole  Injectable 40 milliGRAM(s) IV Push every 12 hours  predniSONE   Tablet 40 milliGRAM(s) Oral daily  sodium bicarbonate 650 milliGRAM(s) Oral three times a day  sodium chloride 0.45%. 1000 milliLiter(s) (75 mL/Hr) IV Continuous <Continuous>  tiotropium 2.5 MICROgram(s) Inhaler 2 Puff(s) Inhalation daily    MEDICATIONS  (PRN):  acetaminophen     Tablet .. 650 milliGRAM(s) Oral every 6 hours PRN Temp greater or equal to 38C (100.4F), Mild Pain (1 - 3)  albuterol    90 MICROgram(s) HFA Inhaler 2 Puff(s) Inhalation every 6 hours PRN Shortness of Breath and/or Wheezing  ondansetron Injectable 4 milliGRAM(s) IntraMuscular every 6 hours PRN Nausea and/or Vomiting      Allergies    No Known Allergies    Intolerances        Vital Signs Last 24 Hrs  T(C): 36.6 (14 Aug 2023 16:33), Max: 37.1 (14 Aug 2023 04:36)  T(F): 97.8 (14 Aug 2023 16:33), Max: 98.7 (14 Aug 2023 04:36)  HR: 50 (14 Aug 2023 17:00) (50 - 71)  BP: 110/48 (14 Aug 2023 16:33) (101/63 - 111/62)  BP(mean): --  RR: 18 (14 Aug 2023 16:33) (18 - 18)  SpO2: 100% (14 Aug 2023 17:00) (98% - 100%)    Parameters below as of 14 Aug 2023 16:33  Patient On (Oxygen Delivery Method): nasal cannula        PHYSICAL EXAM:  GENERAL: NAD   HEAD:  Atraumatic, Normocephalic  EYES: EOMI, PERRLA   NECK: Supple   NERVOUS SYSTEM:  Alert & Oriented X3, Good concentration   CHEST/LUNG: Clear to auscultation bilaterally; No rales, rhonchi, wheezing, or rubs  HEART: Regular rate and rhythm; No murmurs, rubs, or gallops  ABDOMEN: Soft, Nontender, Nondistended; Bowel sounds present  EXTREMITIES: bilateral foot tenderness, R>L       LABS:                        7.7    5.55  )-----------( 155      ( 14 Aug 2023 07:35 )             25.0     08-14    136  |  111<H>  |  24<H>  ----------------------------<  94  4.5   |  20<L>  |  2.99<H>    Ca    7.7<L>      14 Aug 2023 07:35  Mg     1.6     08-13        Urinalysis Basic - ( 14 Aug 2023 07:35 )    Color: x / Appearance: x / SG: x / pH: x  Gluc: 94 mg/dL / Ketone: x  / Bili: x / Urobili: x   Blood: x / Protein: x / Nitrite: x   Leuk Esterase: x / RBC: x / WBC x   Sq Epi: x / Non Sq Epi: x / Bacteria: x         RADIOLOGY & ADDITIONAL TESTS:    08-13-23 @ 07:01  -  08-14-23 @ 07:00  --------------------------------------------------------  IN:    IV PiggyBack: 50 mL    sodium chloride 0.45%: 900 mL  Total IN: 950 mL    OUT:    Voided (mL): 700 mL  Total OUT: 700 mL    Total NET: 250 mL      08-14-23 @ 07:01  -  08-14-23 @ 20:25  --------------------------------------------------------  IN:  Total IN: 0 mL    OUT:    Voided (mL): 300 mL  Total OUT: 300 mL    Total NET: -300 mL

## 2023-08-14 NOTE — PROGRESS NOTE ADULT - SUBJECTIVE AND OBJECTIVE BOX
NEPHROLOGY PROGRESS NOTE    CHIEF COMPLAINT:  CKD    HPI:  Cramps are better but still s/o "gout" pain in right foot.    EXAM:  Vital Signs Last 24 Hrs  T(C): 36.4 (14 Aug 2023 10:53), Max: 37.1 (14 Aug 2023 04:36)  T(F): 97.6 (14 Aug 2023 10:53), Max: 98.7 (14 Aug 2023 04:36)  HR: 66 (14 Aug 2023 10:53) (52 - 72)  BP: 109/71 (14 Aug 2023 10:53) (100/65 - 111/62)  BP(mean): --  RR: 18 (14 Aug 2023 10:53) (18 - 18)  SpO2: 100% (14 Aug 2023 10:53) (98% - 100%)    Parameters below as of 14 Aug 2023 10:53  Patient On (Oxygen Delivery Method): nasal cannula      Conversant, in no apparent distress  Normal respiratory effort, lungs clear bilaterally  Heart RRR with no murmur, no peripheral edema    LABS                        7.7    5.55  )-----------( 155      ( 14 Aug 2023 07:35 )             25.0     08-14    136  |  111<H>  |  24<H>  ----------------------------<  94  4.5   |  20<L>  |  2.99<H>    Ca    7.7<L>      14 Aug 2023 07:35  Mg     1.6     08-13      Urinalysis Basic - ( 14 Aug 2023 07:35 )    Color: x / Appearance: x / SG: x / pH: x  Gluc: 94 mg/dL / Ketone: x  / Bili: x / Urobili: x   Blood: x / Protein: x / Nitrite: x   Leuk Esterase: x / RBC: x / WBC x   Sq Epi: x / Non Sq Epi: x / Bacteria: x      Impression  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

## 2023-08-14 NOTE — PROGRESS NOTE ADULT - TIME BILLING
review of chart, events, blood work, vitals, medications, imaging, direct patient care, d/w hospitalist
review of chart, blood work, vitals, medications, imaging, direct patient care

## 2023-08-14 NOTE — PROGRESS NOTE ADULT - SUBJECTIVE AND OBJECTIVE BOX
Patient is a 68y old  Female who presents with GI bleeding (14 Aug 2023 12:40)    PAST MEDICAL & SURGICAL HISTORY:  HTN (hypertension)    Gout    Hypothyroid    Asthma    HSV (herpes simplex virus) infection    History of COPD  On 3LNC sometimes at home    Paroxysmal atrial fibrillation    SONU on CPAP    chronic kidney disease    Anemia    Surgery, elective  right hand - 3 fingers repairs    H/O bariatric surgery    INTERVAL HISTORY: c/o Rt foot pain from gout, no acute distress  	  MEDICATIONS:  MEDICATIONS  (STANDING):  budesonide 160 MICROgram(s)/formoterol 4.5 MICROgram(s) Inhaler 2 Puff(s) Inhalation two times a day  colchicine 0.3 milliGRAM(s) Oral daily  folic acid 1 milliGRAM(s) Oral daily  pantoprazole  Injectable 40 milliGRAM(s) IV Push every 12 hours  sodium bicarbonate 650 milliGRAM(s) Oral three times a day  sodium chloride 0.45%. 1000 milliLiter(s) (75 mL/Hr) IV Continuous <Continuous>    MEDICATIONS  (PRN):  acetaminophen     Tablet .. 650 milliGRAM(s) Oral every 6 hours PRN Temp greater or equal to 38C (100.4F), Mild Pain (1 - 3)  albuterol    90 MICROgram(s) HFA Inhaler 2 Puff(s) Inhalation every 6 hours PRN Shortness of Breath and/or Wheezing  ondansetron Injectable 4 milliGRAM(s) IntraMuscular every 6 hours PRN Nausea and/or Vomiting    Vitals:  T(F): 97.6 (08-14-23 @ 10:53), Max: 98.7 (08-14-23 @ 04:36)  HR: 66 (08-14-23 @ 10:53) (52 - 72)  BP: 109/71 (08-14-23 @ 10:53) (100/65 - 111/62)  RR: 18 (08-14-23 @ 10:53) (18 - 18)  SpO2: 100% (08-14-23 @ 10:53) (98% - 100%)    08-13 @ 07:01  -  08-14 @ 07:00  --------------------------------------------------------  IN:    IV PiggyBack: 50 mL    sodium chloride 0.45%: 900 mL  Total IN: 950 mL    OUT:    Voided (mL): 700 mL  Total OUT: 700 mL    Total NET: 250 mL      08-14 @ 07:01  -  08-14 @ 15:05  --------------------------------------------------------  IN:  Total IN: 0 mL    OUT:    Voided (mL): 300 mL  Total OUT: 300 mL    Total NET: -300 mL    PHYSICAL EXAM:  Neuro: Awake, responsive  CV: S1 S2 RRR  Lungs: diminished to bases   GI: Soft, BS +, ND, NT  Extremities: No edema, Rt foot sensitive and tender     TELEMETRY: sinus  	    RADIOLOGY: < from: CT Abdomen and Pelvis No Cont (08.09.23 @ 18:31) >  LOWER CHEST: Within normal limits.    LIVER: Within normal limits.  BILE DUCTS: Normal caliber.  GALLBLADDER: Cholelithiasis without evidence of cholecystitis.  SPLEEN: Within normal limits.  PANCREAS: Within normal limits.  ADRENALS: Within normal limits.  KIDNEYS/URETERS: Within normal limits.    BLADDER: Within normal limits.  REPRODUCTIVE ORGANS: IUD in position. No adnexal lesion.    BOWEL: Sleeve gastrectomy. No bowel obstruction. Few colonic diverticula   without evidence of diverticulitis. Appendix is not visualized. No   evidence of inflammation in the pericecal region.  PERITONEUM: No ascites.  VESSELS: Atherosclerotic changes.  RETROPERITONEUM/LYMPH NODES: No lymphadenopathy.  ABDOMINAL WALL: Within normal limits.  BONES: Degenerative changes.    IMPRESSION:  No bowel obstruction or inflammation.  Few colonic diverticula without diverticulitis. No abnormal mass along   the GI tract.    Cholelithiasis without evidence of cholecystitis.    < end of copied text >    DIAGNOSTIC TESTING:    [x ] Echocardiogram:   < from: TTE Echo Complete w/o Contrast w/ Doppler (08.10.23 @ 14:03) >  Left Ventricle: The left ventricular internal cavity size is normal.  Global LV systolic function was normal. Left ventricular ejection   fraction, by visual estimation, is 55 to 60%. Spectral Doppler shows   impaired relaxation pattern of left ventricular myocardial filling (Grade   I diastolic dysfunction).  Right Ventricle: Normal right ventricular size and function.  Left Atrium: Mildly enlarged left atrium.  Right Atrium: The right atrium is normal in size.  Pericardium:The pericardium was not well visualized.  Mitral Valve: Mild thickening of the anterior and posterior mitral valve   leaflets. There is mild mitral annular calcification. Mild mitral valve   regurgitation is seen.  Tricuspid Valve: The tricuspid valve is not well seen. Mild tricuspid   regurgitation is visualized. Estimated pulmonary artery systolic pressure   is 35.9 mmHg assuming a right atrial pressure of 3 mmHg, which is   consistent with borderline pulmonary hypertension.  Aortic Valve: The aortic valve is trileaflet. Sclerotic aortic valve with   normal opening. Moderate aortic valve regurgitation is seen.  Pulmonic Valve: The pulmonic valve was not well visualized. Mild pulmonic   valve regurgitation.  Aorta: Aortic root measured at Sinus of Valsalva is normal.  Pulmonary Artery: The pulmonary artery is not well seen.      Summary:   1. Left ventricular ejection fraction, by visual estimation, is 55 to   60%.   2. Normal global left ventricular systolic function.   3. Normal left ventricular internal cavity size.   4. Spectral Doppler shows impaired relaxation pattern of left   ventricular myocardial filling (Grade I diastolic dysfunction).   5. There is severe concentric left ventricular hypertrophy.   6. Normal right ventricular size and function.   7. Mildly enlarged left atrium.   8. Mild thickening of the anterior and posterior mitral valve leaflets.   9. Moderate aortic regurgitation.  10. Sclerotic aortic valve with normal opening.  11. Estimated pulmonary artery systolic pressure is 35.9 mmHg assuming a   right atrial pressure of 3 mmHg, which is consistent with borderline   pulmonary hypertension.    < end of copied text >  [x ] Cardiac Catheterization:   < from: Cardiac Catheterization (05.27.22 @ 15:06) >  Coronary Angiography   The coronary circulation is right dominant.      LM   Left main artery: Angiography shows no disease.      LAD   Left anterior descending artery: Angiography shows no disease.      CX   Circumflex: Angiography shows no disease.      RCA   Right coronary artery: Angiography shows no disease.      < end of copied text >    LABS:	 	    CARDIAC MARKERS:  Troponin I, High Sensitivity Result: 18.8 ng/L (08-12 @ 09:45)    14 Aug 2023 07:35    136    |  111    |  24     ----------------------------<  94     4.5     |  20     |  2.99   13 Aug 2023 06:05    138    |  113    |  24     ----------------------------<  88     4.4     |  17     |  3.01   12 Aug 2023 09:45    137    |  113    |  26     ----------------------------<  100    4.2     |  18     |  3.17     Ca    7.7        14 Aug 2023 07:35  Mg     1.6       13 Aug 2023 06:05                       7.7    5.55  )-----------( 155      ( 14 Aug 2023 07:35 )             25.0 ,                       8.0    5.85  )-----------( 161      ( 13 Aug 2023 06:05 )             25.3 ,                       8.0    5.74  )-----------( 166      ( 12 Aug 2023 09:45 )             25.7

## 2023-08-15 LAB
ALBUMIN SERPL ELPH-MCNC: 2.3 G/DL — LOW (ref 3.3–5)
ALP SERPL-CCNC: 528 U/L — HIGH (ref 40–120)
ALT FLD-CCNC: 117 U/L — HIGH (ref 12–78)
ANION GAP SERPL CALC-SCNC: 6 MMOL/L — SIGNIFICANT CHANGE UP (ref 5–17)
AST SERPL-CCNC: 93 U/L — HIGH (ref 15–37)
BASE EXCESS BLDV CALC-SCNC: -7.9 MMOL/L — LOW (ref -2–3)
BASE EXCESS BLDV CALC-SCNC: -8.1 MMOL/L — LOW (ref -2–3)
BILIRUB SERPL-MCNC: 0.9 MG/DL — SIGNIFICANT CHANGE UP (ref 0.2–1.2)
BLOOD GAS COMMENTS, VENOUS: SIGNIFICANT CHANGE UP
BLOOD GAS COMMENTS, VENOUS: SIGNIFICANT CHANGE UP
BUN SERPL-MCNC: 24 MG/DL — HIGH (ref 7–23)
CALCIUM SERPL-MCNC: 8.4 MG/DL — LOW (ref 8.5–10.1)
CHLORIDE BLDV-SCNC: 108 MMOL/L — HIGH (ref 98–107)
CHLORIDE BLDV-SCNC: 108 MMOL/L — HIGH (ref 98–107)
CHLORIDE SERPL-SCNC: 109 MMOL/L — HIGH (ref 96–108)
CO2 BLDV-SCNC: 18 MMOL/L — LOW (ref 22–26)
CO2 BLDV-SCNC: 20 MMOL/L — LOW (ref 22–26)
CO2 SERPL-SCNC: 21 MMOL/L — LOW (ref 22–31)
CREAT SERPL-MCNC: 2.8 MG/DL — HIGH (ref 0.5–1.3)
EGFR: 18 ML/MIN/1.73M2 — LOW
GAS PNL BLDV: 131 MMOL/L — LOW (ref 136–145)
GAS PNL BLDV: 132 MMOL/L — LOW (ref 136–145)
GAS PNL BLDV: SIGNIFICANT CHANGE UP
GLUCOSE BLDV-MCNC: 118 MG/DL — HIGH (ref 65–95)
GLUCOSE BLDV-MCNC: 123 MG/DL — HIGH (ref 65–95)
GLUCOSE SERPL-MCNC: 113 MG/DL — HIGH (ref 70–99)
HCO3 BLDV-SCNC: 17 MMOL/L — LOW (ref 22–28)
HCO3 BLDV-SCNC: 18 MMOL/L — LOW (ref 22–28)
HCT VFR BLD CALC: 25.7 % — LOW (ref 34.5–45)
HCT VFR BLDA CALC: 26 % — LOW (ref 37–47)
HCT VFR BLDA CALC: 27 % — LOW (ref 37–47)
HGB BLD CALC-MCNC: 8.7 G/DL — LOW (ref 11.7–16.1)
HGB BLD CALC-MCNC: 9 G/DL — LOW (ref 11.7–16.1)
HGB BLD-MCNC: 8.3 G/DL — LOW (ref 11.5–15.5)
HOROWITZ INDEX BLDV+IHG-RTO: 0.28 — SIGNIFICANT CHANGE UP
HOROWITZ INDEX BLDV+IHG-RTO: 28 — SIGNIFICANT CHANGE UP
LACTATE BLDV-MCNC: 0.6 MMOL/L — SIGNIFICANT CHANGE UP (ref 0.56–1.39)
LACTATE BLDV-MCNC: 1.4 MMOL/L — HIGH (ref 0.56–1.39)
MAGNESIUM SERPL-MCNC: 2.2 MG/DL — SIGNIFICANT CHANGE UP (ref 1.6–2.6)
MCHC RBC-ENTMCNC: 32.3 G/DL — SIGNIFICANT CHANGE UP (ref 32–36)
MCHC RBC-ENTMCNC: 33.2 PG — SIGNIFICANT CHANGE UP (ref 27–34)
MCV RBC AUTO: 102.8 FL — HIGH (ref 80–100)
NRBC # BLD: 0 /100 WBCS — SIGNIFICANT CHANGE UP (ref 0–0)
PCO2 BLDV: 30 MMHG — LOW (ref 42–55)
PCO2 BLDV: 40 MMHG — LOW (ref 42–55)
PH BLDV: 7.27 — LOW (ref 7.32–7.43)
PH BLDV: 7.35 — SIGNIFICANT CHANGE UP (ref 7.32–7.43)
PHOSPHATE SERPL-MCNC: 3.9 MG/DL — SIGNIFICANT CHANGE UP (ref 2.5–4.5)
PLATELET # BLD AUTO: 191 K/UL — SIGNIFICANT CHANGE UP (ref 150–400)
PO2 BLDV: 76 MMHG — HIGH (ref 25–45)
PO2 BLDV: 95 MMHG — HIGH (ref 25–45)
POTASSIUM BLDV-SCNC: 5 MMOL/L — SIGNIFICANT CHANGE UP (ref 3.5–5.1)
POTASSIUM BLDV-SCNC: 5 MMOL/L — SIGNIFICANT CHANGE UP (ref 3.5–5.1)
POTASSIUM SERPL-MCNC: 4.8 MMOL/L — SIGNIFICANT CHANGE UP (ref 3.5–5.3)
POTASSIUM SERPL-SCNC: 4.8 MMOL/L — SIGNIFICANT CHANGE UP (ref 3.5–5.3)
PROT SERPL-MCNC: 6.8 GM/DL — SIGNIFICANT CHANGE UP (ref 6–8.3)
RBC # BLD: 2.5 M/UL — LOW (ref 3.8–5.2)
RBC # FLD: 16.4 % — HIGH (ref 10.3–14.5)
SAO2 % BLDV: 95.7 % — SIGNIFICANT CHANGE UP (ref 94–98)
SAO2 % BLDV: 98 % — SIGNIFICANT CHANGE UP (ref 94–98)
SODIUM SERPL-SCNC: 136 MMOL/L — SIGNIFICANT CHANGE UP (ref 135–145)
WBC # BLD: 6.93 K/UL — SIGNIFICANT CHANGE UP (ref 3.8–10.5)
WBC # FLD AUTO: 6.93 K/UL — SIGNIFICANT CHANGE UP (ref 3.8–10.5)

## 2023-08-15 PROCEDURE — 99232 SBSQ HOSP IP/OBS MODERATE 35: CPT

## 2023-08-15 RX ADMIN — PANTOPRAZOLE SODIUM 40 MILLIGRAM(S): 20 TABLET, DELAYED RELEASE ORAL at 06:34

## 2023-08-15 RX ADMIN — Medication 650 MILLIGRAM(S): at 14:01

## 2023-08-15 RX ADMIN — Medication 650 MILLIGRAM(S): at 21:34

## 2023-08-15 RX ADMIN — Medication 650 MILLIGRAM(S): at 06:35

## 2023-08-15 RX ADMIN — SODIUM CHLORIDE 75 MILLILITER(S): 9 INJECTION, SOLUTION INTRAVENOUS at 21:34

## 2023-08-15 RX ADMIN — Medication 0.3 MILLIGRAM(S): at 11:11

## 2023-08-15 RX ADMIN — Medication 1 MILLIGRAM(S): at 11:11

## 2023-08-15 RX ADMIN — SODIUM CHLORIDE 75 MILLILITER(S): 9 INJECTION, SOLUTION INTRAVENOUS at 07:59

## 2023-08-15 RX ADMIN — Medication 40 MILLIGRAM(S): at 06:34

## 2023-08-15 RX ADMIN — PANTOPRAZOLE SODIUM 40 MILLIGRAM(S): 20 TABLET, DELAYED RELEASE ORAL at 17:23

## 2023-08-15 NOTE — PROGRESS NOTE ADULT - SUBJECTIVE AND OBJECTIVE BOX
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 She is lying in bed in NAD.     MEDICATIONS  (STANDING):  budesonide 160 MICROgram(s)/formoterol 4.5 MICROgram(s) Inhaler 2 Puff(s) Inhalation two times a day  colchicine 0.3 milliGRAM(s) Oral daily  folic acid 1 milliGRAM(s) Oral daily  pantoprazole  Injectable 40 milliGRAM(s) IV Push every 12 hours  predniSONE   Tablet 40 milliGRAM(s) Oral daily  sodium bicarbonate 650 milliGRAM(s) Oral three times a day  sodium chloride 0.45%. 1000 milliLiter(s) (75 mL/Hr) IV Continuous <Continuous>  tiotropium 2.5 MICROgram(s) Inhaler 2 Puff(s) Inhalation daily    MEDICATIONS  (PRN):  acetaminophen     Tablet .. 650 milliGRAM(s) Oral every 6 hours PRN Temp greater or equal to 38C (100.4F), Mild Pain (1 - 3)  albuterol    90 MICROgram(s) HFA Inhaler 2 Puff(s) Inhalation every 6 hours PRN Shortness of Breath and/or Wheezing  ondansetron Injectable 4 milliGRAM(s) IntraMuscular every 6 hours PRN Nausea and/or Vomiting      Allergies    No Known Allergies    Intolerances        Vital Signs Last 24 Hrs  T(C): 36.5 (15 Aug 2023 17:04), Max: 36.7 (15 Aug 2023 04:20)  T(F): 97.7 (15 Aug 2023 17:04), Max: 98 (15 Aug 2023 04:20)  HR: 64 (15 Aug 2023 17:04) (61 - 64)  BP: 138/70 (15 Aug 2023 17:04) (109/66 - 166/89)   RR: 18 (15 Aug 2023 17:04) (18 - 19)  SpO2: 100% (15 Aug 2023 17:04) (97% - 100%)    Parameters below as of 15 Aug 2023 17:04  Patient On (Oxygen Delivery Method): nasal cannula        PHYSICAL EXAM:  GENERAL: NAD   HEAD:  Atraumatic, Normocephalic  EYES: EOMI, PERRLA   NECK: Supple   NERVOUS SYSTEM:  Alert & Oriented X3, Good concentration   CHEST/LUNG: Clear to auscultation bilaterally; No rales, rhonchi, wheezing, or rubs  HEART: Regular rate and rhythm; No murmurs, rubs, or gallops  ABDOMEN: Soft, Nontender, Nondistended; Bowel sounds present  EXTREMITIES: bilateral foot tenderness, R>L improved from yesterday    LABS:                        8.3    6.93  )-----------( 191      ( 15 Aug 2023 06:45 )             25.7     08-15    136  |  109<H>  |  24<H>  ----------------------------<  113<H>  4.8   |  21<L>  |  2.80<H>    Ca    8.4<L>      15 Aug 2023 06:45  Phos  3.9     08-15  Mg     2.2     08-15    TPro  6.8  /  Alb  2.3<L>  /  TBili  0.9  /  DBili  x   /  AST  93<H>  /  ALT  117<H>  /  AlkPhos  528<H>  08-15      Urinalysis Basic - ( 15 Aug 2023 06:45 )    Color: x / Appearance: x / SG: x / pH: x  Gluc: 113 mg/dL / Ketone: x  / Bili: x / Urobili: x   Blood: x / Protein: x / Nitrite: x   Leuk Esterase: x / RBC: x / WBC x   Sq Epi: x / Non Sq Epi: x / Bacteria: x       RADIOLOGY & ADDITIONAL TESTS:    08-14-23 @ 07:01  -  08-15-23 @ 07:00  --------------------------------------------------------  IN:    Oral Fluid: 360 mL  Total IN: 360 mL    OUT:    Voided (mL): 1000 mL  Total OUT: 1000 mL    Total NET: -640 mL      08-15-23 @ 07:01  -  08-15-23 @ 20:11  --------------------------------------------------------  IN:    sodium chloride 0.45%: 900 mL  Total IN: 900 mL    OUT:    Voided (mL): 600 mL  Total OUT: 600 mL    Total NET: 300 mL

## 2023-08-15 NOTE — PROGRESS NOTE ADULT - SUBJECTIVE AND OBJECTIVE BOX
Patient is a 68y old  Female who presents with a chief complaint of GI bleeding (15 Aug 2023 10:18)      PAST MEDICAL & SURGICAL HISTORY:  HTN (hypertension)      Gout      Hypothyroid      Asthma      HSV (herpes simplex virus) infection      SOB (shortness of breath)  R/T inhalation of chemicals at [place] ( [employer] )      History of COPD  On 3LNC sometimes at home      Paroxysmal atrial fibrillation      Essential hypertension      Gout      SONU on CPAP      Stage 3 chronic kidney disease      Anemia      Surgery, elective  right hand - 3 fingers repairs      H/O bariatric surgery        INTERVAL HISTORY:  	  MEDICATIONS:  MEDICATIONS  (STANDING):  budesonide 160 MICROgram(s)/formoterol 4.5 MICROgram(s) Inhaler 2 Puff(s) Inhalation two times a day  colchicine 0.3 milliGRAM(s) Oral daily  folic acid 1 milliGRAM(s) Oral daily  pantoprazole  Injectable 40 milliGRAM(s) IV Push every 12 hours  predniSONE   Tablet 40 milliGRAM(s) Oral daily  sodium bicarbonate 650 milliGRAM(s) Oral three times a day  sodium chloride 0.45%. 1000 milliLiter(s) (75 mL/Hr) IV Continuous <Continuous>  tiotropium 2.5 MICROgram(s) Inhaler 2 Puff(s) Inhalation daily    MEDICATIONS  (PRN):  acetaminophen     Tablet .. 650 milliGRAM(s) Oral every 6 hours PRN Temp greater or equal to 38C (100.4F), Mild Pain (1 - 3)  albuterol    90 MICROgram(s) HFA Inhaler 2 Puff(s) Inhalation every 6 hours PRN Shortness of Breath and/or Wheezing  ondansetron Injectable 4 milliGRAM(s) IntraMuscular every 6 hours PRN Nausea and/or Vomiting      Vitals:  T(F): 97.2 (08-15-23 @ 11:12), Max: 98 (08-15-23 @ 04:20)  HR: 64 (08-15-23 @ 11:12) (50 - 66)  BP: 166/89 (08-15-23 @ 11:12) (109/66 - 166/89)  RR: 19 (08-15-23 @ 11:12) (18 - 19)  SpO2: 97% (08-15-23 @ 11:12) (97% - 100%)  Wt(kg): --    08-14 @ 07:01  -  08-15 @ 07:00  --------------------------------------------------------  IN:    Oral Fluid: 360 mL  Total IN: 360 mL    OUT:    Voided (mL): 1000 mL  Total OUT: 1000 mL    Total NET: -640 mL        Weight (kg): 107 (08-15 @ 11:46)  BMI (kg/m2): 35.9 (08-15 @ 11:46)      PHYSICAL EXAM:  Neuro: Awake, responsive  CV: S1 S2 RRR  Lungs: CTABL  GI: Soft, BS +, ND, NT  Extremities: No edema    TELEMETRY: sinus     RADIOLOGY:     DIAGNOSTIC TESTING:    [ ] Echocardiogram:  [ ] Cardiac Catheterization:  [ ] Stress Test:      LABS:	 	    15 Aug 2023 06:45    136    |  109    |  24     ----------------------------<  113    4.8     |  21     |  2.80   14 Aug 2023 07:35    136    |  111    |  24     ----------------------------<  94     4.5     |  20     |  2.99   13 Aug 2023 06:05    138    |  113    |  24     ----------------------------<  88     4.4     |  17     |  3.01     Ca    8.4        15 Aug 2023 06:45  Phos  3.9       15 Aug 2023 06:45  Mg     2.2       15 Aug 2023 06:45    TPro  6.8    /  Alb  2.3    /  TBili  0.9    /  DBili  x      /  AST  93     /  ALT  117    /  AlkPhos  528    15 Aug 2023 06:45                        8.3    6.93  )-----------( 191      ( 15 Aug 2023 06:45 )             25.7 ,                       7.7    5.55  )-----------( 155      ( 14 Aug 2023 07:35 )             25.0 ,                       8.0    5.85  )-----------( 161      ( 13 Aug 2023 06:05 )             25.3                Patient is a 68y old  Female who presents with GI bleeding (15 Aug 2023 10:18)    PAST MEDICAL & SURGICAL HISTORY:  HTN (hypertension)    Gout    Hypothyroid    Asthma    HSV (herpes simplex virus) infection    History of COPD  On 3LNC sometimes at home    Paroxysmal atrial fibrillation    SONU on CPAP    chronic kidney disease    Anemia    Surgery, elective  right hand - 3 fingers repairs    H/O bariatric surgery    INTERVAL HISTORY: in no acute distress, Rt foot pain improving   	  MEDICATIONS:  MEDICATIONS  (STANDING):  budesonide 160 MICROgram(s)/formoterol 4.5 MICROgram(s) Inhaler 2 Puff(s) Inhalation two times a day  colchicine 0.3 milliGRAM(s) Oral daily  folic acid 1 milliGRAM(s) Oral daily  pantoprazole  Injectable 40 milliGRAM(s) IV Push every 12 hours  predniSONE   Tablet 40 milliGRAM(s) Oral daily  sodium bicarbonate 650 milliGRAM(s) Oral three times a day  sodium chloride 0.45%. 1000 milliLiter(s) (75 mL/Hr) IV Continuous <Continuous>  tiotropium 2.5 MICROgram(s) Inhaler 2 Puff(s) Inhalation daily    MEDICATIONS  (PRN):  acetaminophen     Tablet .. 650 milliGRAM(s) Oral every 6 hours PRN Temp greater or equal to 38C (100.4F), Mild Pain (1 - 3)  albuterol    90 MICROgram(s) HFA Inhaler 2 Puff(s) Inhalation every 6 hours PRN Shortness of Breath and/or Wheezing  ondansetron Injectable 4 milliGRAM(s) IntraMuscular every 6 hours PRN Nausea and/or Vomiting    Vitals:  T(F): 97.2 (08-15-23 @ 11:12), Max: 98 (08-15-23 @ 04:20)  HR: 64 (08-15-23 @ 11:12) (50 - 66)  BP: 166/89 (08-15-23 @ 11:12) (109/66 - 166/89)  RR: 19 (08-15-23 @ 11:12) (18 - 19)  SpO2: 97% (08-15-23 @ 11:12) (97% - 100%)    08-14 @ 07:01  -  08-15 @ 07:00  --------------------------------------------------------  IN:    Oral Fluid: 360 mL  Total IN: 360 mL    OUT:    Voided (mL): 1000 mL  Total OUT: 1000 mL    Total NET: -640 mL    Weight (kg): 107 (08-15 @ 11:46)  BMI (kg/m2): 35.9 (08-15 @ 11:46)    PHYSICAL EXAM:  Neuro: Awake, responsive  CV: S1 S2 RRR  Lungs: diminished to bases   GI: Soft, BS +, ND, NT  Extremities: LE edema, RT>LT    TELEMETRY: sinus     RADIOLOGY: < from: US Duplex Venous Lower Ext Complete, Bilateral (08.11.23 @ 10:52) >  No acute DVT of either lower extremity.    Large right knee joint effusion with apparent internal complexity.    Small right popliteal fossa Baker's cyst measures 2.9 x 0.8 x 1.6 cm.    Bilateral lower extremity superficial soft tissue edema, correlate   clinically.    < end of copied text >  < from: CT Abdomen and Pelvis No Cont (08.09.23 @ 18:31) >  LOWER CHEST: Within normal limits.    LIVER: Within normal limits.  BILE DUCTS: Normal caliber.  GALLBLADDER: Cholelithiasis without evidence of cholecystitis.  SPLEEN: Within normal limits.  PANCREAS: Within normal limits.  ADRENALS: Within normal limits.  KIDNEYS/URETERS: Within normal limits.    BLADDER: Within normal limits.  REPRODUCTIVE ORGANS: IUD in position. No adnexal lesion.    BOWEL: Sleeve gastrectomy. No bowel obstruction. Few colonic diverticula   without evidence of diverticulitis. Appendix is not visualized. No   evidence of inflammation in the pericecal region.  PERITONEUM: No ascites.  VESSELS: Atherosclerotic changes.  RETROPERITONEUM/LYMPH NODES: No lymphadenopathy.  ABDOMINAL WALL: Within normal limits.  BONES: Degenerative changes.    IMPRESSION:  No bowel obstruction or inflammation.  Few colonic diverticula without diverticulitis. No abnormal mass along   the GI tract.    Cholelithiasis without evidence of cholecystitis.    < end of copied text >    DIAGNOSTIC TESTING:    [x ] Echocardiogram:  < from: TTE Echo Complete w/o Contrast w/ Doppler (08.10.23 @ 14:03) >  Left Ventricle: The left ventricular internal cavity size is normal.  Global LV systolic function was normal. Left ventricular ejection   fraction, by visual estimation, is 55 to 60%. Spectral Doppler shows   impaired relaxation pattern of left ventricular myocardial filling (Grade   I diastolic dysfunction).  Right Ventricle: Normal right ventricular size and function.  Left Atrium: Mildly enlarged left atrium.  Right Atrium: The right atrium is normal in size.  Pericardium:The pericardium was not well visualized.  Mitral Valve: Mild thickening of the anterior and posterior mitral valve   leaflets. There is mild mitral annular calcification. Mild mitral valve   regurgitation is seen.  Tricuspid Valve: The tricuspid valve is not well seen. Mild tricuspid   regurgitation is visualized. Estimated pulmonary artery systolic pressure   is 35.9 mmHg assuming a right atrial pressure of 3 mmHg, which is   consistent with borderline pulmonary hypertension.  Aortic Valve: The aortic valve is trileaflet. Sclerotic aortic valve with   normal opening. Moderate aortic valve regurgitation is seen.  Pulmonic Valve: The pulmonic valve was not well visualized. Mild pulmonic   valve regurgitation.  Aorta: Aortic root measured at Sinus of Valsalva is normal.  Pulmonary Artery: The pulmonary artery is not well seen.      Summary:   1. Left ventricular ejection fraction, by visual estimation, is 55 to   60%.   2. Normal global left ventricular systolic function.   3. Normal left ventricular internal cavity size.   4. Spectral Doppler shows impaired relaxation pattern of left   ventricular myocardial filling (Grade I diastolic dysfunction).   5. There is severe concentric left ventricular hypertrophy.   6. Normal right ventricular size and function.   7. Mildly enlarged left atrium.   8. Mild thickening of the anterior and posterior mitral valve leaflets.   9. Moderate aortic regurgitation.  10. Sclerotic aortic valve with normal opening.  11. Estimated pulmonary artery systolic pressure is 35.9 mmHg assuming a   right atrial pressure of 3 mmHg, which is consistent with borderline   pulmonary hypertension.    < end of copied text >    [x ] Cardiac Catheterization:< from: Cardiac Catheterization (05.27.22 @ 15:06) >  Coronary Angiography   The coronary circulation is right dominant.      LM   Left main artery: Angiography shows no disease.      LAD   Left anterior descending artery: Angiography shows no disease.      CX   Circumflex: Angiography shows no disease.      RCA   Right coronary artery: Angiography shows no disease.      < end of copied text >    LABS:	 	    15 Aug 2023 06:45    136    |  109    |  24     ----------------------------<  113    4.8     |  21     |  2.80   14 Aug 2023 07:35    136    |  111    |  24     ----------------------------<  94     4.5     |  20     |  2.99   13 Aug 2023 06:05    138    |  113    |  24     ----------------------------<  88     4.4     |  17     |  3.01     Ca    8.4        15 Aug 2023 06:45  Phos  3.9       15 Aug 2023 06:45  Mg     2.2       15 Aug 2023 06:45    TPro  6.8    /  Alb  2.3    /  TBili  0.9    /  DBili  x      /  AST  93     /  ALT  117    /  AlkPhos  528    15 Aug 2023 06:45                        8.3    6.93  )-----------( 191      ( 15 Aug 2023 06:45 )             25.7 ,                       7.7    5.55  )-----------( 155      ( 14 Aug 2023 07:35 )             25.0 ,                       8.0    5.85  )-----------( 161      ( 13 Aug 2023 06:05 )             25.3

## 2023-08-15 NOTE — PROGRESS NOTE ADULT - ASSESSMENT
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    UGIB w/ acute blood loss anemia  - CT abd showed few colonic diverticula without diverticulitis but no abnormal mass along the GI tract  - Hgb stable  - Transfuse to maintain Hgb > 7  - c/w folic acid  - c/w Protonix  - as per GI- severe metabolic acidosis needs to be resolved before patient can undergo EGD     VY on CKD IV  - Cr improving    Metabolic acidosis  - acidosis slowly improving with bicarb drip, and now with oral bicarb  - Nephrology following     Chronic Respiratory Failure with hypoxia due to COPD/ SONU on CPAP  - c/w CPAP & O2 via NC  - c/w Spiriva, Symbicort and Proair   - patient is stable/at baseline  - pulmonary following    Hx of LE DVT   - holding AC in setting of melena  - LE dopplers did not show DVT in either extremity    Right knee joint effusion and small right popliteal fossa Baker's cyst measure   - US showed a large right knee joint effusion with apparent internal complexity and a small right popliteal fossa Baker's cyst   - likely from severe OA  - as per ortho, no intervention    Paroxysmal Atrial fibrillation   - Echo showed EF 55 - 60% w/ grade I diastolic dysfunction w/ severe concentric left ventricular hypertrophy, moderate aortic regurgitation with borderline pulmonary hypertension  - rate controlled  - holding AC in setting of GI bleed    Gout  - start prednisone  - c/w colchicine     Prophylaxis:  DVT: ICDs  GI: Protonix

## 2023-08-15 NOTE — PROGRESS NOTE ADULT - ASSESSMENT
68F HTN, gout, hypothyroidism, L. DVT, atrial fibrillation, COPD on home O2 (from exposure [place] ), SONU on CPAP, obesity s/p gastric sleeve, chronic anemia here with melena, acute on chronic anemia. Seen by GI with plans for endoscopy. Requesting pulmonary clearance. Found to have severe metabolic acidosis pH 7.1, HCO3: 10. Hospital course with acute gout flare.    DX: GI bleed, anemia due to blood loss, melena, metabolic acidosis, , VY on CKD, COPD with oxygen dependence, chronic hypoxic respiratory failure, SONU on CPAP, obesity, gout    -COPD - continue bronchodilators spiriva/symbicort ( takes trilogy outpatient)  -continue albuterol PRN ( has not required during admission)   - has not used CPAP although encouraged   - currently on 2L NC will continue to titrate down as able   -patient use 3L O2 at home   - maintain O2 sat in the low 90s  - SOB improved. Her SOB likely multifactorial: underlying COPD, acute on chronic anemia, with some SOB from respiratory compensation of metabolic acidosis     -pending EGD once optimized from a respiratory standpoint  - VBG this Am improved PH 7.27. CO2:40  Po2 75, HCO3 18  - she has severe metabolic acidosis with compensatory respiratory alkalosis- likely component of dehydration with VY a  - continue  po bicarb    - ?gout flare: on colchicine. Started  systemic steroids Prednisone 40mg X 5 days   - avoid NSAIDs with recent VY and melena  - monitor for further bleeding and H/H trend: s/p 1 unit pRBC 8/10  - GI follow up for EGD  - renal follow up    - Atrial fibrillation - rate controlled,  - continue to hold  AC in setting of GI bleed  - TTE 5/2023 LV could not be well visualized.  TTE -- 55 to 60%. 68F HTN, gout, hypothyroidism, L. DVT, atrial fibrillation, COPD on home O2 (from exposure [place] ), SONU on CPAP, obesity s/p gastric sleeve, chronic anemia here with melena, acute on chronic anemia. Seen by GI with plans for endoscopy. Requesting pulmonary clearance. Found to have severe metabolic acidosis pH 7.1, HCO3: 10. Hospital course with acute gout flare.    DX: GI bleed, anemia due to blood loss, melena, metabolic acidosis, , VY on CKD, COPD with oxygen dependence, chronic hypoxic respiratory failure, SONU on CPAP, obesity, gout    - COPD - continue bronchodilators spiriva/symbicort ( takes trilogy outpatient)  - continue albuterol PRN ( has not required during admission)   - has not used CPAP although encouraged   - currently on 2L NC will continue to titrate down as able   - patient use 3L O2 at home   - maintain O2 sat in the low 90s  - SOB improved. Her SOB likely multifactorial: underlying COPD, acute on chronic anemia, with some SOB from respiratory compensation of metabolic acidosis     - pending EGD once optimized from a respiratory standpoint  - VBG this Am improved PH 7.27. CO2:40  Po2 75, HCO3 18  - plan is repeat VBG tonight -- if PH 7.3- patient may proceed for EGD   - she has severe metabolic acidosis with compensatory respiratory alkalosis- likely component of dehydration with VY a  - continue  po bicarb    - ?gout flare: on colchicine. Started  systemic steroids Prednisone 40mg X 5 days   - avoid NSAIDs with recent VY and melena  - monitor for further bleeding and H/H trend: s/p 1 unit pRBC 8/10  - GI follow up for EGD  - renal follow up    - Atrial fibrillation - rate controlled,  - continue to hold  AC in setting of GI bleed  - TTE 5/2023 LV could not be well visualized.  TTE -- 55 to 60%.

## 2023-08-15 NOTE — DIETITIAN INITIAL EVALUATION ADULT - OTHER INFO
Pt lives alone; is able to do most ADLs independently; has a supportive son who visits daily to assist as needed.  Pt denies any N/V/C/D or chew/swallowing difficulty.  She was NPO again today pending endoscopy but procedure was cancelled due to abnormal blood work (pt c metabolic acidosis).  Per JOSEPH Jimenez & Dr. Renetta rae for pt to have a full liquid tray for dinner tonight & then she will be NPO again starting at midnight for procedure tomorrow.  Endoscopy for GI bleed in setting of ongoing heartburn.

## 2023-08-15 NOTE — DIETITIAN INITIAL EVALUATION ADULT - PERTINENT LABORATORY DATA
08-15    136  |  109<H>  |  24<H>  ----------------------------<  113<H>  4.8   |  21<L>  |  2.80<H>    Ca    8.4<L>      15 Aug 2023 06:45  Phos  3.9     08-15  Mg     2.2     08-15    TPro  6.8  /  Alb  2.3<L>  /  TBili  0.9  /  DBili  x   /  AST  93<H>  /  ALT  117<H>  /  AlkPhos  528<H>  08-15

## 2023-08-15 NOTE — PROGRESS NOTE ADULT - ASSESSMENT
68F HTN, hypothyroidism, Afib on Eliquis, COPD on O2, SONU who presented with several days of melena and LOMAX with acute on chronic anemia.  s/p one unit of PRBC transfusion    initial symptoms of chest pressure and LOMAX, likely due to anemia vs. intra-abdominal process. all symptoms currently resolved.   cont with supplemental O2, inhalers for COPD  ?gout flare, continued on colchicine renal dose/prednisone, Rt foot pain and swelling improving   Recent cardiac catheterization with no evidence of CAD  awaiting EGD pending metabolic derangements  TTE with preserved EF, Grade I DD, mod AR  see note from 8/12/23 for complete pre op comments

## 2023-08-15 NOTE — PROGRESS NOTE ADULT - SUBJECTIVE AND OBJECTIVE BOX
CHIEF COMPLAINT:  ===============      INTERVAL EVENTS:  ==================    - did not use NIV   -afebrile   -O2 saturation 100% on  L       REVIEW OF SYSTEMS:  ==================  - no fever, no chills  - no HA, no dizziness  - no visual changes, no auditory changes  - no sore throat, no sinus congestion  - no SOB, no cough  - no chest pain, no palpitations  - no abdominal pain, no N/V/D  - no dysuria, no hematuria  - no myalgias, no arthralgias  - no pain in extremity, no swelling   - no rashes, no pruritis  - no neuro deficits  - no psychiatric concerns       HPI:  HPI:  68 year old F hx of HTN, gout, hypothyroidism, L. 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 She has multiple medical complaints. For melena, she takes ibuprofen/advil/motrin, never had endoscopy, last c-scope was wnl several years prior. She also endorses feeling tight in her neck and having swallowing issues. She had hypothyroidism for some time, but is no longer on synthroid. She is drinking liquids currently to stay hydrated. She also endorses chest pressure and tightness and near syncope today. She denies palpitations and states the pain is more dull in nature.     Troponin was 81 in the setting of elevated creatinine. EKG was NSR no acute ST elevation or pathologic Q waves. Qtc wnl, [place] 1st degree AV block.   creatinine 3.27 from baseline 2.16, potassium noted to be 5.6. Hemoglobin 7.6 from baseline around 9s.        (09 Aug 2023 18:49)      OBJECTIVE:  ===========    ICU Vital Signs Last 24 Hrs  T(C): 36.7 (15 Aug 2023 04:20), Max: 36.7 (15 Aug 2023 04:20)  T(F): 98 (15 Aug 2023 04:20), Max: 98 (15 Aug 2023 04:20)  HR: 63 (15 Aug 2023 08:55) (50 - 66)  BP: 109/66 (15 Aug 2023 04:20) (109/66 - 113/63)  RR: 18 (15 Aug 2023 04:20) (18 - 18)  SpO2: 100% (15 Aug 2023 08:55) (97% - 100%)    O2 Parameters below as of 15 Aug 2023 04:20  Patient On (Oxygen Delivery Method): nasal cannula    08-14 @ 07:01  -  08-15 @ 07:00  --------------------------------------------------------  IN: 360 mL / OUT: 1000 mL / NET: -640 mL      CAPILLARY BLOOD GLUCOSE  PHYSICAL EXAM:  ==============  General:   HEENT:   Respiratory:   Cardiovascular:   Abdomen:   Extremities:   Skin:   Neurological:  Psychiatry:    HOSPITAL MEDICATIONS:  ======================  colchicine 0.3 milliGRAM(s) Oral daily  predniSONE   Tablet 40 milliGRAM(s) Oral daily  albuterol    90 MICROgram(s) HFA Inhaler 2 Puff(s) Inhalation every 6 hours PRN  budesonide 160 MICROgram(s)/formoterol 4.5 MICROgram(s) Inhaler 2 Puff(s) Inhalation two times a day  tiotropium 2.5 MICROgram(s) Inhaler 2 Puff(s) Inhalation daily  acetaminophen     Tablet .. 650 milliGRAM(s) Oral every 6 hours PRN  ondansetron Injectable 4 milliGRAM(s) IntraMuscular every 6 hours PRN  pantoprazole  Injectable 40 milliGRAM(s) IV Push every 12 hours  folic acid 1 milliGRAM(s) Oral daily  sodium bicarbonate 650 milliGRAM(s) Oral three times a day  sodium chloride 0.45%. 1000 milliLiter(s) IV Continuous <Continuous>        LABS:  =====                        8.3    6.93  )-----------( 191      ( 15 Aug 2023 06:45 )             25.7     Hgb Trend: 8.3<--, 7.7<--, 8.0<--, 8.0<--, 8.0<--  08-15    136  |  109<H>  |  24<H>  ----------------------------<  113<H>  4.8   |  21<L>  |  2.80<H>    Ca    8.4<L>      15 Aug 2023 06:45  Phos  3.9     08-15  Mg     2.2     08-15    TPro  6.8  /  Alb  2.3<L>  /  TBili  0.9  /  DBili  x   /  AST  93<H>  /  ALT  117<H>  /  AlkPhos  528<H>  08-15    Creatinine Trend: 2.80<--, 2.99<--, 3.01<--, 3.17<--, 3.19<--, 3.21<--    Lactate, Blood: 1.2    Urinalysis Basic - ( 15 Aug 2023 06:45 )    Color: x / Appearance: x / SG: x / pH: x  Gluc: 113 mg/dL / Ketone: x  / Bili: x / Urobili: x   Blood: x / Protein: x / Nitrite: x   Leuk Esterase: x / RBC: x / WBC x   Sq Epi: x / Non Sq Epi: x / Bacteria: x        Venous Blood Gas:  08-15 @ 06:54  7.27/40/76/18/95.7  VBG Lactate: 0.60    MICROBIOLOGY:         RADIOLOGY:  ==========  < from: Xray Knee 3 Views, Right (08.12.23 @ 11:37) >    IMPRESSION:    No fracture or dislocation.  Small suprapatellar joint effusion  Moderate to severe tricompartmental degenerative changes and narrowing.  Moderate atherosclerotic change.    US Duplex Venous Lower Ext Complete, Bilateral (08.11.23 @ 10:52) >  IMPRESSION:    No acute DVT of either lower extremity.    Large right knee joint effusion with apparent internal complexity.    Small right popliteal fossa Baker's cyst measures 2.9 x 0.8 x 1.6 cm.    Bilateral lower extremity superficial soft tissue edema, correlate   clinically.    : CT Neck Soft Tissue No Cont (08.10.23 @ 16:00) >  IMPRESSION: No cervical mass or adenopathy identified on this   noncontrast exam.    : CT Abdomen and Pelvis No Cont (08.09.23 @ 18:31) >  IMPRESSION:  No bowel obstruction or inflammation.  Few colonic diverticula without diverticulitis. No abnormal mass along   the GI tract.    Cholelithiasis without evidence of cholecystitis.    PULMONARY:  ============    -nasal cannula  -CPAP PRN/nocturnal     CARDIAC:  ========   CHIEF COMPLAINT:  ===============  GI BLEEDING     INTERVAL EVENTS:  ==================    - did not use NIV   -afebrile   -O2 saturation 100% on  2l NC   - ph 7.27- ON vbg   - Right ankle pain improved       REVIEW OF SYSTEMS:  ==================  - no fever, no chills  - no HA, no dizziness  - no visual changes, no auditory changes  - no sore throat, no sinus congestion  - no SOB, no cough  - no chest pain, no palpitations  - no abdominal pain, no N/V/D  - no dysuria, no hematuria  - no myalgias, no arthralgias  - no pain in extremity, no swelling   - no rashes, no pruritis  - no neuro deficits  - no psychiatric concerns     HPI:  68 year old F hx of HTN, gout, hypothyroidism, L. 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 She has multiple medical complaints. For melena, she takes ibuprofen/advil/motrin, never had endoscopy, last c-scope was wnl several years prior. She also endorses feeling tight in her neck and having swallowing issues. She had hypothyroidism for some time, but is no longer on synthroid. She is drinking liquids currently to stay hydrated. She also endorses chest pressure and tightness and near syncope today. She denies palpitations and states the pain is more dull in nature.     Troponin was 81 in the setting of elevated creatinine. EKG was NSR no acute ST elevation or pathologic Q waves. Qtc wnl, [place] 1st degree AV block.   creatinine 3.27 from baseline 2.16, potassium noted to be 5.6. Hemoglobin 7.6 from baseline around 9s.      (09 Aug 2023 18:49)    OBJECTIVE:  ===========    ICU Vital Signs Last 24 Hrs  T(C): 36.7 (15 Aug 2023 04:20), Max: 36.7 (15 Aug 2023 04:20)  T(F): 98 (15 Aug 2023 04:20), Max: 98 (15 Aug 2023 04:20)  HR: 63 (15 Aug 2023 08:55) (50 - 66)  BP: 109/66 (15 Aug 2023 04:20) (109/66 - 113/63)  RR: 18 (15 Aug 2023 04:20) (18 - 18)  SpO2: 100% (15 Aug 2023 08:55) (97% - 100%)    O2 Parameters below as of 15 Aug 2023 04:20  Patient On (Oxygen Delivery Method): nasal cannula    08-14 @ 07:01  -  08-15 @ 07:00  --------------------------------------------------------  IN: 360 mL / OUT: 1000 mL / NET: -640 mL      CAPILLARY BLOOD GLUCOSE  PHYSICAL EXAM:  ==============  General: awake , alert no distress   Respiratory: CTA B/L   Cardiovascular: S1S2 RRR   Abdomen:  SOFT + BS no reported melena   Extremities: trace edema  Skin: Intact , no lesions   Neurological: no deficits  Psychiatry: [facility] MEDICATIONS:  ======================  colchicine 0.3 milliGRAM(s) Oral daily  predniSONE   Tablet 40 milliGRAM(s) Oral daily  albuterol    90 MICROgram(s) HFA Inhaler 2 Puff(s) Inhalation every 6 hours PRN  budesonide 160 MICROgram(s)/formoterol 4.5 MICROgram(s) Inhaler 2 Puff(s) Inhalation two times a day  tiotropium 2.5 MICROgram(s) Inhaler 2 Puff(s) Inhalation daily  acetaminophen     Tablet .. 650 milliGRAM(s) Oral every 6 hours PRN  ondansetron Injectable 4 milliGRAM(s) IntraMuscular every 6 hours PRN  pantoprazole  Injectable 40 milliGRAM(s) IV Push every 12 hours  folic acid 1 milliGRAM(s) Oral daily  sodium bicarbonate 650 milliGRAM(s) Oral three times a day  sodium chloride 0.45%. 1000 milliLiter(s) IV Continuous <Continuous>        LABS:  =====                        8.3    6.93  )-----------( 191      ( 15 Aug 2023 06:45 )             25.7     Hgb Trend: 8.3<--, 7.7<--, 8.0<--, 8.0<--, 8.0<--  08-15    136  |  109<H>  |  24<H>  ----------------------------<  113<H>  4.8   |  21<L>  |  2.80<H>    Ca    8.4<L>      15 Aug 2023 06:45  Phos  3.9     08-15  Mg     2.2     08-15    TPro  6.8  /  Alb  2.3<L>  /  TBili  0.9  /  DBili  x   /  AST  93<H>  /  ALT  117<H>  /  AlkPhos  528<H>  08-15    Creatinine Trend: 2.80<--, 2.99<--, 3.01<--, 3.17<--, 3.19<--, 3.21<--    Lactate, Blood: 1.2    Urinalysis Basic - ( 15 Aug 2023 06:45 )  Color: x / Appearance: x / SG: x / pH: x  Gluc: 113 mg/dL / Ketone: x  / Bili: x / Urobili: x   Blood: x / Protein: x / Nitrite: x   Leuk Esterase: x / RBC: x / WBC x   Sq Epi: x / Non Sq Epi: x / Bacteria: x    Venous Blood Gas:  08-15 @ 06:54  7.27/40/76/18/95.7  VBG Lactate: 0.60    MICROBIOLOGY:         RADIOLOGY:  ==========  Xray Knee 3 Views, Right (08.12.23 @ 11:37) >    IMPRESSION:  No fracture or dislocation.  Small suprapatellar joint effusion  Moderate to severe tricompartmental degenerative changes and narrowing.  Moderate atherosclerotic change.    US Duplex Venous Lower Ext Complete, Bilateral (08.11.23 @ 10:52) >  IMPRESSION:    No acute DVT of either lower extremity.    Large right knee joint effusion with apparent internal complexity.    Small right popliteal fossa Baker's cyst measures 2.9 x 0.8 x 1.6 cm.    Bilateral lower extremity superficial soft tissue edema, correlate   clinically.    CT Neck Soft Tissue No Cont (08.10.23 @ 16:00) >  IMPRESSION: No cervical mass or adenopathy identified on this   noncontrast exam.    CT Abdomen and Pelvis No Cont (08.09.23 @ 18:31) >  IMPRESSION:  No bowel obstruction or inflammation.  Few colonic diverticula without diverticulitis. No abnormal mass along   the GI tract.    Cholelithiasis without evidence of cholecystitis.    PULMONARY:  ============    -nasal cannula  -CPAP PRN/nocturnal     CARDIAC:  ========   CHIEF COMPLAINT:  ===============  GI BLEEDING     INTERVAL EVENTS:  ==================    - did not use NIV   -afebrile   -O2 saturation 100% on  2l NC   - ph 7.27- ON vbg   - Right ankle pain improved       REVIEW OF SYSTEMS:  ==================  - no fever, no chills  - no HA, no dizziness  - no visual changes, no auditory changes  - no sore throat, no sinus congestion  - no SOB, no cough  - no chest pain, no palpitations  - no abdominal pain, no N/V/D  - no dysuria, no hematuria  - no myalgias, no arthralgias  - no pain in extremity, no swelling   - no rashes, no pruritis  - no neuro deficits  - no psychiatric concerns     HPI:  68 year old F hx of HTN, gout, hypothyroidism, L. 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 She has multiple medical complaints. For melena, she takes ibuprofen/advil/motrin, never had endoscopy, last c-scope was wnl several years prior. She also endorses feeling tight in her neck and having swallowing issues. She had hypothyroidism for some time, but is no longer on synthroid. She is drinking liquids currently to stay hydrated. She also endorses chest pressure and tightness and near syncope today. She denies palpitations and states the pain is more dull in nature.     Troponin was 81 in the setting of elevated creatinine. EKG was NSR no acute ST elevation or pathologic Q waves. Qtc wnl, [place] 1st degree AV block.   creatinine 3.27 from baseline 2.16, potassium noted to be 5.6. Hemoglobin 7.6 from baseline around 9s.      (09 Aug 2023 18:49)    OBJECTIVE:  ===========    Vital Signs Last 24 Hrs  T(C): 36.7 (15 Aug 2023 04:20), Max: 36.7 (15 Aug 2023 04:20)  T(F): 98 (15 Aug 2023 04:20), Max: 98 (15 Aug 2023 04:20)  HR: 63 (15 Aug 2023 08:55) (50 - 66)  BP: 109/66 (15 Aug 2023 04:20) (109/66 - 113/63)  RR: 18 (15 Aug 2023 04:20) (18 - 18)  SpO2: 100% (15 Aug 2023 08:55) (97% - 100%)    O2 Parameters below as of 15 Aug 2023 04:20  Patient On (Oxygen Delivery Method): nasal cannula    08-14 @ 07:01  -  08-15 @ 07:00  --------------------------------------------------------  IN: 360 mL / OUT: 1000 mL / NET: -640 mL      CAPILLARY BLOOD GLUCOSE  PHYSICAL EXAM:  ==============  General: awake , alert no distress   Respiratory: CTA B/L   Cardiovascular: S1S2 RRR   Abdomen:  SOFT + BS no reported melena   Extremities: trace edema  Skin: Intact , no lesions   Neurological: no deficits  Psychiatry: [facility] MEDICATIONS:  ======================  colchicine 0.3 milliGRAM(s) Oral daily  predniSONE   Tablet 40 milliGRAM(s) Oral daily  albuterol    90 MICROgram(s) HFA Inhaler 2 Puff(s) Inhalation every 6 hours PRN  budesonide 160 MICROgram(s)/formoterol 4.5 MICROgram(s) Inhaler 2 Puff(s) Inhalation two times a day  tiotropium 2.5 MICROgram(s) Inhaler 2 Puff(s) Inhalation daily  acetaminophen     Tablet .. 650 milliGRAM(s) Oral every 6 hours PRN  ondansetron Injectable 4 milliGRAM(s) IntraMuscular every 6 hours PRN  pantoprazole  Injectable 40 milliGRAM(s) IV Push every 12 hours  folic acid 1 milliGRAM(s) Oral daily  sodium bicarbonate 650 milliGRAM(s) Oral three times a day  sodium chloride 0.45%. 1000 milliLiter(s) IV Continuous <Continuous>        LABS:  =====                        8.3    6.93  )-----------( 191      ( 15 Aug 2023 06:45 )             25.7     Hgb Trend: 8.3<--, 7.7<--, 8.0<--, 8.0<--, 8.0<--  08-15    136  |  109<H>  |  24<H>  ----------------------------<  113<H>  4.8   |  21<L>  |  2.80<H>    Ca    8.4<L>      15 Aug 2023 06:45  Phos  3.9     08-15  Mg     2.2     08-15    TPro  6.8  /  Alb  2.3<L>  /  TBili  0.9  /  DBili  x   /  AST  93<H>  /  ALT  117<H>  /  AlkPhos  528<H>  08-15    Creatinine Trend: 2.80<--, 2.99<--, 3.01<--, 3.17<--, 3.19<--, 3.21<--    Lactate, Blood: 1.2    Urinalysis Basic - ( 15 Aug 2023 06:45 )  Color: x / Appearance: x / SG: x / pH: x  Gluc: 113 mg/dL / Ketone: x  / Bili: x / Urobili: x   Blood: x / Protein: x / Nitrite: x   Leuk Esterase: x / RBC: x / WBC x   Sq Epi: x / Non Sq Epi: x / Bacteria: x    Venous Blood Gas:  08-15 @ 06:54  7.27/40/76/18/95.7  VBG Lactate: 0.60    MICROBIOLOGY:         RADIOLOGY:  ==========  Xray Knee 3 Views, Right (08.12.23 @ 11:37) >    IMPRESSION:  No fracture or dislocation.  Small suprapatellar joint effusion  Moderate to severe tricompartmental degenerative changes and narrowing.  Moderate atherosclerotic change.    US Duplex Venous Lower Ext Complete, Bilateral (08.11.23 @ 10:52) >  IMPRESSION:    No acute DVT of either lower extremity.    Large right knee joint effusion with apparent internal complexity.    Small right popliteal fossa Baker's cyst measures 2.9 x 0.8 x 1.6 cm.    Bilateral lower extremity superficial soft tissue edema, correlate   clinically.    CT Neck Soft Tissue No Cont (08.10.23 @ 16:00) >  IMPRESSION: No cervical mass or adenopathy identified on this   noncontrast exam.    CT Abdomen and Pelvis No Cont (08.09.23 @ 18:31) >  IMPRESSION:  No bowel obstruction or inflammation.  Few colonic diverticula without diverticulitis. No abnormal mass along   the GI tract.    Cholelithiasis without evidence of cholecystitis.    PULMONARY:  ============    -nasal cannula  -CPAP PRN/nocturnal     CARDIAC:  ========

## 2023-08-15 NOTE — DIETITIAN INITIAL EVALUATION ADULT - NS FNS DIET ORDER
Diet, NPO after Midnight:      NPO Start Date: 14-Aug-2023,   NPO Start Time: 23:59 (08-14-23 @ 17:06)

## 2023-08-15 NOTE — DIETITIAN INITIAL EVALUATION ADULT - ENERGY INTAKE
Pt has been NPO or on clear liquids x 4 days & on Full liquids x 2 days during admission/Poor (<50%)

## 2023-08-16 LAB
ALBUMIN SERPL ELPH-MCNC: 2.5 G/DL — LOW (ref 3.3–5)
ALP SERPL-CCNC: 455 U/L — HIGH (ref 40–120)
ALT FLD-CCNC: 85 U/L — HIGH (ref 12–78)
ANION GAP SERPL CALC-SCNC: 4 MMOL/L — LOW (ref 5–17)
AST SERPL-CCNC: 55 U/L — HIGH (ref 15–37)
BILIRUB SERPL-MCNC: 0.6 MG/DL — SIGNIFICANT CHANGE UP (ref 0.2–1.2)
BUN SERPL-MCNC: 24 MG/DL — HIGH (ref 7–23)
CALCIUM SERPL-MCNC: 8.1 MG/DL — LOW (ref 8.5–10.1)
CHLORIDE SERPL-SCNC: 114 MMOL/L — HIGH (ref 96–108)
CO2 SERPL-SCNC: 21 MMOL/L — LOW (ref 22–31)
CREAT SERPL-MCNC: 2.66 MG/DL — HIGH (ref 0.5–1.3)
EGFR: 19 ML/MIN/1.73M2 — LOW
GLUCOSE SERPL-MCNC: 97 MG/DL — SIGNIFICANT CHANGE UP (ref 70–99)
HCT VFR BLD CALC: 25 % — LOW (ref 34.5–45)
HGB BLD-MCNC: 7.9 G/DL — LOW (ref 11.5–15.5)
MAGNESIUM SERPL-MCNC: 2 MG/DL — SIGNIFICANT CHANGE UP (ref 1.6–2.6)
MCHC RBC-ENTMCNC: 31.6 G/DL — LOW (ref 32–36)
MCHC RBC-ENTMCNC: 32.6 PG — SIGNIFICANT CHANGE UP (ref 27–34)
MCV RBC AUTO: 103.3 FL — HIGH (ref 80–100)
NRBC # BLD: 0 /100 WBCS — SIGNIFICANT CHANGE UP (ref 0–0)
PHOSPHATE SERPL-MCNC: 3.5 MG/DL — SIGNIFICANT CHANGE UP (ref 2.5–4.5)
PLATELET # BLD AUTO: 195 K/UL — SIGNIFICANT CHANGE UP (ref 150–400)
POTASSIUM SERPL-MCNC: 4.4 MMOL/L — SIGNIFICANT CHANGE UP (ref 3.5–5.3)
POTASSIUM SERPL-SCNC: 4.4 MMOL/L — SIGNIFICANT CHANGE UP (ref 3.5–5.3)
PROT SERPL-MCNC: 6.5 GM/DL — SIGNIFICANT CHANGE UP (ref 6–8.3)
RBC # BLD: 2.42 M/UL — LOW (ref 3.8–5.2)
RBC # FLD: 16.3 % — HIGH (ref 10.3–14.5)
SODIUM SERPL-SCNC: 139 MMOL/L — SIGNIFICANT CHANGE UP (ref 135–145)
TSH SERPL-MCNC: 2.82 UU/ML — SIGNIFICANT CHANGE UP (ref 0.36–3.74)
WBC # BLD: 6.81 K/UL — SIGNIFICANT CHANGE UP (ref 3.8–10.5)
WBC # FLD AUTO: 6.81 K/UL — SIGNIFICANT CHANGE UP (ref 3.8–10.5)

## 2023-08-16 PROCEDURE — 99232 SBSQ HOSP IP/OBS MODERATE 35: CPT

## 2023-08-16 PROCEDURE — 43235 EGD DIAGNOSTIC BRUSH WASH: CPT

## 2023-08-16 RX ORDER — SODIUM CHLORIDE 9 MG/ML
1000 INJECTION INTRAMUSCULAR; INTRAVENOUS; SUBCUTANEOUS
Refills: 0 | Status: DISCONTINUED | OUTPATIENT
Start: 2023-08-16 | End: 2023-08-16

## 2023-08-16 RX ORDER — ONDANSETRON 8 MG/1
4 TABLET, FILM COATED ORAL ONCE
Refills: 0 | Status: DISCONTINUED | OUTPATIENT
Start: 2023-08-16 | End: 2023-08-16

## 2023-08-16 RX ORDER — SOD SULF/SODIUM/NAHCO3/KCL/PEG
4000 SOLUTION, RECONSTITUTED, ORAL ORAL ONCE
Refills: 0 | Status: COMPLETED | OUTPATIENT
Start: 2023-08-16 | End: 2023-08-16

## 2023-08-16 RX ORDER — ALLOPURINOL 300 MG
100 TABLET ORAL DAILY
Refills: 0 | Status: DISCONTINUED | OUTPATIENT
Start: 2023-08-16 | End: 2023-08-28

## 2023-08-16 RX ADMIN — SODIUM CHLORIDE 10 MILLILITER(S): 9 INJECTION INTRAMUSCULAR; INTRAVENOUS; SUBCUTANEOUS at 15:52

## 2023-08-16 RX ADMIN — Medication 4000 MILLILITER(S): at 22:41

## 2023-08-16 RX ADMIN — Medication 40 MILLIGRAM(S): at 06:22

## 2023-08-16 RX ADMIN — PANTOPRAZOLE SODIUM 40 MILLIGRAM(S): 20 TABLET, DELAYED RELEASE ORAL at 17:05

## 2023-08-16 RX ADMIN — SODIUM CHLORIDE 75 MILLILITER(S): 9 INJECTION, SOLUTION INTRAVENOUS at 17:13

## 2023-08-16 RX ADMIN — Medication 650 MILLIGRAM(S): at 06:22

## 2023-08-16 RX ADMIN — Medication 650 MILLIGRAM(S): at 22:40

## 2023-08-16 RX ADMIN — PANTOPRAZOLE SODIUM 40 MILLIGRAM(S): 20 TABLET, DELAYED RELEASE ORAL at 06:22

## 2023-08-16 RX ADMIN — Medication 100 MILLIGRAM(S): at 17:05

## 2023-08-16 NOTE — PROGRESS NOTE ADULT - ASSESSMENT
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    UGIB w/ acute blood loss anemia  - CT abd showed few colonic diverticula without diverticulitis but no abnormal mass along the GI tract  - Hgb stable  - EGD on 8/16 showed no source of melena was identified, will get colonoscopy    - Transfuse to maintain Hgb > 7  - c/w folic acid  - c/w Protonix  - as per GI- severe metabolic acidosis needs to be resolved before patient can undergo EGD     VY on CKD IV  - Cr improving    Metabolic acidosis  - acidosis slowly improving with bicarb drip, and now with oral bicarb  - Nephrology following     Chronic Respiratory Failure with hypoxia due to COPD/ SONU on CPAP  - c/w CPAP & O2 via NC  - c/w Spiriva, Symbicort and Proair   - patient is stable/at baseline  - pulmonary following    Hx of LE DVT   - holding AC in setting of melena  - LE dopplers did not show DVT in either extremity    Right knee joint effusion and small right popliteal fossa Baker's cyst measure   - US showed a large right knee joint effusion with apparent internal complexity and a small right popliteal fossa Baker's cyst   - likely from severe OA  - as per ortho, no intervention    Paroxysmal Atrial fibrillation   - Echo showed EF 55 - 60% w/ grade I diastolic dysfunction w/ severe concentric left ventricular hypertrophy, moderate aortic regurgitation with borderline pulmonary hypertension  - rate controlled  - holding AC in setting of GI bleed    Gout  - start prednisone  - c/w colchicine     Prophylaxis:  DVT: ICDs  GI: Protonix

## 2023-08-16 NOTE — PROGRESS NOTE ADULT - SUBJECTIVE AND OBJECTIVE BOX
[facility] NEPHROLOGY SERVICES, [facility]  NEPHROLOGY AND HYPERTENSION  [address]  [address]  [place] [address]  [phone]    MD ANNE MARIE HEWITT MD YELENA ROSENBERG, MD BINNY KOSHY, MD CHRISTOPHER CAPUTO, MD EDWARD BOVER, MD          Patient events noted    MEDICATIONS  (STANDING):  budesonide 160 MICROgram(s)/formoterol 4.5 MICROgram(s) Inhaler 2 Puff(s) Inhalation two times a day  colchicine 0.3 milliGRAM(s) Oral daily  folic acid 1 milliGRAM(s) Oral daily  pantoprazole  Injectable 40 milliGRAM(s) IV Push every 12 hours  predniSONE   Tablet 40 milliGRAM(s) Oral daily  sodium bicarbonate 650 milliGRAM(s) Oral three times a day  sodium chloride 0.45%. 1000 milliLiter(s) (75 mL/Hr) IV Continuous <Continuous>  tiotropium 2.5 MICROgram(s) Inhaler 2 Puff(s) Inhalation daily    MEDICATIONS  (PRN):  acetaminophen     Tablet .. 650 milliGRAM(s) Oral every 6 hours PRN Temp greater or equal to 38C (100.4F), Mild Pain (1 - 3)  albuterol    90 MICROgram(s) HFA Inhaler 2 Puff(s) Inhalation every 6 hours PRN Shortness of Breath and/or Wheezing  ondansetron Injectable 4 milliGRAM(s) IntraMuscular every 6 hours PRN Nausea and/or Vomiting      08-15-23 @ 07:01  -  08-16-23 @ 07:00  --------------------------------------------------------  IN: 1140 mL / OUT: 1400 mL / NET: -260 mL      PHYSICAL EXAM:      T(C): 36.3 (08-16-23 @ 11:20), Max: 36.8 (08-16-23 @ 04:55)  HR: 61 (08-16-23 @ 11:20) (57 - 64)  BP: 114/62 (08-16-23 @ 11:20) (114/62 - 153/73)  RR: 19 (08-16-23 @ 11:20) (18 - 19)  SpO2: 100% (08-16-23 @ 11:20) (99% - 100%)  Wt(kg): --  Lungs clear  Heart S1S2  Abd soft NT ND  Extremities:   tr edema                                    7.9    6.81  )-----------( 195      ( 16 Aug 2023 07:34 )             25.0     08-16    139  |  114<H>  |  24<H>  ----------------------------<  97  4.4   |  21<L>  |  2.66<H>    Ca    8.1<L>      16 Aug 2023 07:34  Phos  3.5     08-16  Mg     2.0     08-16    TPro  6.5  /  Alb  2.5<L>  /  TBili  0.6  /  DBili  x   /  AST  55<H>  /  ALT  85<H>  /  AlkPhos  455<H>  08-16    Uric Acid (08.13.23 @ 06:05)    Uric Acid: 8.2 mg/dL      LIVER FUNCTIONS - ( 16 Aug 2023 07:34 )  Alb: 2.5 g/dL / Pro: 6.5 gm/dL / ALK PHOS: 455 U/L / ALT: 85 U/L / AST: 55 U/L / GGT: x           Creatinine Trend: 2.66<--, 2.80<--, 2.99<--, 3.01<--, 3.17<--, 3.19<--      Impression  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

## 2023-08-16 NOTE — PROGRESS NOTE ADULT - SUBJECTIVE AND OBJECTIVE BOX
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 She is lying in bed in NAD.    MEDICATIONS  (STANDING):  allopurinol 100 milliGRAM(s) Oral daily  bisacodyl 20 milliGRAM(s) Oral once  budesonide 160 MICROgram(s)/formoterol 4.5 MICROgram(s) Inhaler 2 Puff(s) Inhalation two times a day  colchicine 0.3 milliGRAM(s) Oral daily  folic acid 1 milliGRAM(s) Oral daily  pantoprazole  Injectable 40 milliGRAM(s) IV Push every 12 hours  polyethylene glycol/electrolyte Solution. 4000 milliLiter(s) Oral once  predniSONE   Tablet 40 milliGRAM(s) Oral daily  sodium bicarbonate 650 milliGRAM(s) Oral three times a day  sodium chloride 0.45%. 1000 milliLiter(s) (75 mL/Hr) IV Continuous <Continuous>  tiotropium 2.5 MICROgram(s) Inhaler 2 Puff(s) Inhalation daily    MEDICATIONS  (PRN):  acetaminophen     Tablet .. 650 milliGRAM(s) Oral every 6 hours PRN Temp greater or equal to 38C (100.4F), Mild Pain (1 - 3)  albuterol    90 MICROgram(s) HFA Inhaler 2 Puff(s) Inhalation every 6 hours PRN Shortness of Breath and/or Wheezing  ondansetron Injectable 4 milliGRAM(s) IntraMuscular every 6 hours PRN Nausea and/or Vomiting      Allergies    No Known Allergies    Intolerances        Vital Signs Last 24 Hrs  T(C): 36.6 (16 Aug 2023 16:32), Max: 36.8 (16 Aug 2023 04:55)  T(F): 97.9 (16 Aug 2023 16:32), Max: 98.3 (16 Aug 2023 04:55)  HR: 58 (16 Aug 2023 17:12) (55 - 63)  BP: 111/62 (16 Aug 2023 16:32) (110/68 - 153/73)  BP(mean): --  RR: 18 (16 Aug 2023 16:32) (14 - 19)  SpO2: 98% (16 Aug 2023 17:12) (98% - 100%)    Parameters below as of 16 Aug 2023 16:32  Patient On (Oxygen Delivery Method): room air        PHYSICAL EXAM:  GENERAL: NAD   HEAD:  Atraumatic, Normocephalic  EYES: EOMI, PERRLA   NECK: Supple   NERVOUS SYSTEM:  Alert & Oriented X3, Good concentration   CHEST/LUNG: Clear to auscultation bilaterally; No rales, rhonchi, wheezing, or rubs  HEART: Regular rate and rhythm; No murmurs, rubs, or gallops  ABDOMEN: Soft, Nontender, Nondistended; Bowel sounds present  EXTREMITIES: bilateral foot tenderness, R>L improved from yesterday      LABS:                        7.9    6.81  )-----------( 195      ( 16 Aug 2023 07:34 )             25.0     08-16    139  |  114<H>  |  24<H>  ----------------------------<  97  4.4   |  21<L>  |  2.66<H>    Ca    8.1<L>      16 Aug 2023 07:34  Phos  3.5     08-16  Mg     2.0     08-16    TPro  6.5  /  Alb  2.5<L>  /  TBili  0.6  /  DBili  x   /  AST  55<H>  /  ALT  85<H>  /  AlkPhos  455<H>  08-16      Urinalysis Basic - ( 16 Aug 2023 07:34 )    Color: x / Appearance: x / SG: x / pH: x  Gluc: 97 mg/dL / Ketone: x  / Bili: x / Urobili: x   Blood: x / Protein: x / Nitrite: x   Leuk Esterase: x / RBC: x / WBC x   Sq Epi: x / Non Sq Epi: x / Bacteria: x       RADIOLOGY & ADDITIONAL TESTS:    08-15-23 @ 07:01  -  08-16-23 @ 07:00  --------------------------------------------------------  IN:    Oral Fluid: 240 mL    sodium chloride 0.45%: 900 mL  Total IN: 1140 mL    OUT:    Voided (mL): 1400 mL  Total OUT: 1400 mL    Total NET: -260 mL

## 2023-08-16 NOTE — PRE-OP CHECKLIST - ASSESSMENT, HISTORY & PHYSICAL COMPLETED AND ON MEDICAL RECORD
Reason for Call:  Appointment Request    Patient requesting this type of appt:  Preventive     Requested provider: Holland Morrell    Reason patient unable to be scheduled: Not within requested timeframe    When does patient want to be seen/preferred time: before July 28th     Comments: She is trying to get her daughter in for her yearly physical with her doc before July 28th as she heads to college in Aug     Okay to leave a detailed message?: Yes at Cell number on file:    Telephone Information:   Mobile [phone]       Call taken on 6/16/2023 at 2:13 PM by Demetria Keating     
Called and spoke to patient's mom. Appointment made.Rafaela Valencia MA/TC    
done
done

## 2023-08-16 NOTE — PROGRESS NOTE ADULT - SUBJECTIVE AND OBJECTIVE BOX
CHIEF COMPLAINT:  ===============  GI Bleed     INTERVAL EVENTS:  ==================  - PH  7.3  - plan for EGD this AM     REVIEW OF SYSTEMS:  ==================  - no fever, no chills  - no HA, no dizziness  - no visual changes, no auditory changes  - no sore throat, no sinus congestion  - no SOB, no cough  - no chest pain, no palpitations  - no abdominal pain, no N/V/D  - no dysuria, no hematuria  - no myalgias, no arthralgias  - no pain in extremity, no swelling   - no rashes, no pruritis  - no neuro deficits  - no psychiatric concerns       HPI:  HPI:  68 year old F hx of HTN, gout, hypothyroidism, L. 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 She has multiple medical complaints. For melena, she takes ibuprofen/advil/motrin, never had endoscopy, last c-scope was wnl several years prior. She also endorses feeling tight in her neck and having swallowing issues. She had hypothyroidism for some time, but is no longer on synthroid. She is drinking liquids currently to stay hydrated. She also endorses chest pressure and tightness and near syncope today. She denies palpitations and states the pain is more dull in nature.     Troponin was 81 in the setting of elevated creatinine. EKG was NSR no acute ST elevation or pathologic Q waves. Qtc wnl, NE 1st degree AV block.   creatinine 3.27 from baseline 2.16, potassium noted to be 5.6. Hemoglobin 7.6 from baseline around 9s.        (09 Aug 2023 18:49)      OBJECTIVE:  ===========    ICU Vital Signs Last 24 Hrs  T(C): 36.8 (16 Aug 2023 04:55), Max: 36.8 (16 Aug 2023 04:55)  T(F): 98.3 (16 Aug 2023 04:55), Max: 98.3 (16 Aug 2023 04:55)  HR: 63 (16 Aug 2023 05:44) (57 - 64)  BP: 134/66 (16 Aug 2023 04:55) (134/66 - 166/89)  RR: 18 (16 Aug 2023 04:55) (18 - 19)  SpO2: 99% (16 Aug 2023 05:44) (97% - 100%)    O2 Parameters below as of 16 Aug 2023 04:55  Patient On (Oxygen Delivery Method): nasal cannula      08-15 @ 07:01  -  08-16 @ 07:00  --------------------------------------------------------  IN: 1140 mL / OUT: 1400 mL / NET: -260 mL      CAPILLARY BLOOD GLUCOSE    PHYSICAL EXAM:  ==============  General:   HEENT:   Respiratory:   Cardiovascular:   Abdomen:   Extremities:   Skin:   Neurological:  Psychiatry:    HOSPITAL MEDICATIONS:  ======================  colchicine 0.3 milliGRAM(s) Oral daily  predniSONE   Tablet 40 milliGRAM(s) Oral daily  albuterol    90 MICROgram(s) HFA Inhaler 2 Puff(s) Inhalation every 6 hours PRN  budesonide 160 MICROgram(s)/formoterol 4.5 MICROgram(s) Inhaler 2 Puff(s) Inhalation two times a day  tiotropium 2.5 MICROgram(s) Inhaler 2 Puff(s) Inhalation daily  acetaminophen     Tablet .. 650 milliGRAM(s) Oral every 6 hours PRN  ondansetron Injectable 4 milliGRAM(s) IntraMuscular every 6 hours PRN  pantoprazole  Injectable 40 milliGRAM(s) IV Push every 12 hours  folic acid 1 milliGRAM(s) Oral daily  sodium bicarbonate 650 milliGRAM(s) Oral three times a day  sodium chloride 0.45%. 1000 milliLiter(s) IV Continuous <Continuous>            LABS:  =====                        7.9    6.81  )-----------( 195      ( 16 Aug 2023 07:34 )             25.0     Hgb Trend: 7.9<--, 8.3<--, 7.7<--, 8.0<--, 8.0<--  08-16    139  |  114<H>  |  24<H>  ----------------------------<  97  4.4   |  21<L>  |  2.66<H>    Ca    8.1<L>      16 Aug 2023 07:34  Phos  3.5     08-16  Mg     2.0     08-16    TPro  6.5  /  Alb  2.5<L>  /  TBili  0.6  /  DBili  x   /  AST  55<H>  /  ALT  85<H>  /  AlkPhos  455<H>  08-16    Creatinine Trend: 2.66<--, 2.80<--, 2.99<--, 3.01<--, 3.17<--, 3.19<--    Lactate, Blood: 1.2    Urinalysis Basic - ( 16 Aug 2023 07:34 )    Color: x / Appearance: x / SG: x / pH: x  Gluc: 97 mg/dL / Ketone: x  / Bili: x / Urobili: x   Blood: x / Protein: x / Nitrite: x   Leuk Esterase: x / RBC: x / WBC x   Sq Epi: x / Non Sq Epi: x / Bacteria: x        Venous Blood Gas:  08-15 @ 21:36  7.35/30/95/17/98.0  VBG Lactate: 1.40  Venous Blood Gas:  08-15 @ 06:54  7.27/40/76/18/95.7  VBG Lactate: 0.60        MICROBIOLOGY:         RADIOLOGY:  ==========    PULMONARY:  ============        CARDIAC:  ========   CHIEF COMPLAINT:  ===============  GI Bleed     INTERVAL EVENTS:  ==================  - PH  7.3  - plan for EGD this AM     REVIEW OF SYSTEMS:  ==================  - no fever, no chills  - no HA, no dizziness  - no visual changes, no auditory changes  - no sore throat, no sinus congestion  - no SOB, no cough  - no chest pain, no palpitations  - no abdominal pain, no N/V/D  - no dysuria, no hematuria  - no myalgias, no arthralgias  - no pain in extremity, no swelling   - no rashes, no pruritis  - no neuro deficits  - no psychiatric concerns       HPI:  68 year old F hx of HTN, gout, hypothyroidism, L. 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 She has multiple medical complaints. For melena, she takes ibuprofen/advil/motrin, never had endoscopy, last c-scope was wnl several years prior. She also endorses feeling tight in her neck and having swallowing issues. She had hypothyroidism for some time, but is no longer on synthroid. She is drinking liquids currently to stay hydrated. She also endorses chest pressure and tightness and near syncope today. She denies palpitations and states the pain is more dull in nature.     Troponin was 81 in the setting of elevated creatinine. EKG was NSR no acute ST elevation or pathologic Q waves. Qtc wnl, [place] 1st degree AV block.   creatinine 3.27 from baseline 2.16, potassium noted to be 5.6. Hemoglobin 7.6 from baseline around 9s.        (09 Aug 2023 18:49)      OBJECTIVE:  ===========    Vital Signs Last 24 Hrs  T(C): 36.8 (16 Aug 2023 04:55), Max: 36.8 (16 Aug 2023 04:55)  T(F): 98.3 (16 Aug 2023 04:55), Max: 98.3 (16 Aug 2023 04:55)  HR: 63 (16 Aug 2023 05:44) (57 - 64)  BP: 134/66 (16 Aug 2023 04:55) (134/66 - 166/89)  RR: 18 (16 Aug 2023 04:55) (18 - 19)  SpO2: 99% (16 Aug 2023 05:44) (97% - 100%)    O2 Parameters below as of 16 Aug 2023 04:55  Patient On (Oxygen Delivery Method): nasal cannula      08-15 @ 07:01  -  08-16 @ 07:00  --------------------------------------------------------  IN: 1140 mL / OUT: 1400 mL / NET: -260 mL      CAPILLARY BLOOD GLUCOSE    PHYSICAL EXAM:  ==============  General: obese, NAD, comfortable in bed  HEENT: NC/AT, EOMI, sclera white  Respiratory: CTA bilaterally no wheeze, no rhomchi  Cardiovascular: regular rate  Abdomen: obese, soft, NT, ND, + BS  Extremities: no edema/cyanosis, R lateral ankle edema, no edema of R big toe or MTP joint  Skin: no rashes  Neurological: AAOx3      [facility] MEDICATIONS:  ======================  colchicine 0.3 milliGRAM(s) Oral daily  predniSONE   Tablet 40 milliGRAM(s) Oral daily  albuterol    90 MICROgram(s) HFA Inhaler 2 Puff(s) Inhalation every 6 hours PRN  budesonide 160 MICROgram(s)/formoterol 4.5 MICROgram(s) Inhaler 2 Puff(s) Inhalation two times a day  tiotropium 2.5 MICROgram(s) Inhaler 2 Puff(s) Inhalation daily  acetaminophen     Tablet .. 650 milliGRAM(s) Oral every 6 hours PRN  ondansetron Injectable 4 milliGRAM(s) IntraMuscular every 6 hours PRN  pantoprazole  Injectable 40 milliGRAM(s) IV Push every 12 hours  folic acid 1 milliGRAM(s) Oral daily  sodium bicarbonate 650 milliGRAM(s) Oral three times a day  sodium chloride 0.45%. 1000 milliLiter(s) IV Continuous <Continuous>            LABS:  =====                        7.9    6.81  )-----------( 195      ( 16 Aug 2023 07:34 )             25.0     Hgb Trend: 7.9<--, 8.3<--, 7.7<--, 8.0<--, 8.0<--  08-16    139  |  114<H>  |  24<H>  ----------------------------<  97  4.4   |  21<L>  |  2.66<H>    Ca    8.1<L>      16 Aug 2023 07:34  Phos  3.5     08-16  Mg     2.0     08-16    TPro  6.5  /  Alb  2.5<L>  /  TBili  0.6  /  DBili  x   /  AST  55<H>  /  ALT  85<H>  /  AlkPhos  455<H>  08-16    Creatinine Trend: 2.66<--, 2.80<--, 2.99<--, 3.01<--, 3.17<--, 3.19<--    Lactate, Blood: 1.2      Venous Blood Gas:  08-15 @ 21:36  7.35/30/95/17/98.0  VBG Lactate: 1.40  Venous Blood Gas:  08-15 @ 06:54  7.27/40/76/18/95.7  VBG Lactate: 0.60

## 2023-08-16 NOTE — PRE PROCEDURE NOTE - PRE PROCEDURE EVALUATION
68F with COPD, SONU, DVT on apixaban, AF, HTN, gout, gastric sleeve, presenting for melena.  Labs reviewed. Plan for EGD to further assess.    NAD  RRR  CTAB  soft NT ND  WWP  AAOx3

## 2023-08-16 NOTE — PROGRESS NOTE ADULT - ASSESSMENT
68F HTN, gout, hypothyroidism, L. DVT, atrial fibrillation, COPD on home O2 (from exposure [place] ), SONU on CPAP, obesity s/p gastric sleeve, chronic anemia here with melena, acute on chronic anemia. Seen by GI with plans for endoscopy. Requesting pulmonary clearance. Found to have severe metabolic acidosis pH 7.1, HCO3: 10. Hospital course with acute gout flare.    DX: GI bleed, anemia due to blood loss, melena, metabolic acidosis, , VY on CKD, COPD with oxygen dependence, chronic hypoxic respiratory failure, SONU on CPAP, obesity, gout    - COPD - continue bronchodilators spiriva/symbicort ( takes trilogy outpatient)  - continue albuterol PRN ( has not required during admission)   - has not used CPAP although encouraged   - currently on 2L NC will continue to titrate down as able   - patient use 3L O2 at home   - maintain O2 sat in the low 90s  - SOB improved. Her SOB likely multifactorial: underlying COPD, acute on chronic anemia, with some SOB from respiratory compensation of metabolic acidosis     - pending EGD once optimized from a respiratory standpoint  - VBG this Am improved PH 7.27. CO2:40  Po2 75, HCO3 18  - plan is repeat VBG tonight -- if PH 7.3- patient may proceed for EGD   - she has severe metabolic acidosis with compensatory respiratory alkalosis- likely component of dehydration with VY a  - continue  po bicarb    - ?gout flare: on colchicine. Started  systemic steroids Prednisone 40mg X 5 days   - avoid NSAIDs with recent VY and melena  - monitor for further bleeding and H/H trend: s/p 1 unit pRBC 8/10  - GI follow up for EGD  - renal follow up    - Atrial fibrillation - rate controlled,  - continue to hold  AC in setting of GI bleed  - TTE 5/2023 LV could not be well visualized.  TTE -- 55 to 60%.

## 2023-08-17 ENCOUNTER — RESULT REVIEW (OUTPATIENT)
Age: 68
End: 2023-08-17

## 2023-08-17 LAB
ANION GAP SERPL CALC-SCNC: 5 MMOL/L — SIGNIFICANT CHANGE UP (ref 5–17)
BUN SERPL-MCNC: 23 MG/DL — SIGNIFICANT CHANGE UP (ref 7–23)
CALCIUM SERPL-MCNC: 8.1 MG/DL — LOW (ref 8.5–10.1)
CHLORIDE SERPL-SCNC: 114 MMOL/L — HIGH (ref 96–108)
CO2 SERPL-SCNC: 22 MMOL/L — SIGNIFICANT CHANGE UP (ref 22–31)
CREAT SERPL-MCNC: 2.62 MG/DL — HIGH (ref 0.5–1.3)
EGFR: 20 ML/MIN/1.73M2 — LOW
GLUCOSE SERPL-MCNC: 83 MG/DL — SIGNIFICANT CHANGE UP (ref 70–99)
HCT VFR BLD CALC: 26.7 % — LOW (ref 34.5–45)
HGB BLD-MCNC: 8 G/DL — LOW (ref 11.5–15.5)
MCHC RBC-ENTMCNC: 30 G/DL — LOW (ref 32–36)
MCHC RBC-ENTMCNC: 31.5 PG — SIGNIFICANT CHANGE UP (ref 27–34)
MCV RBC AUTO: 105.1 FL — HIGH (ref 80–100)
NRBC # BLD: 0 /100 WBCS — SIGNIFICANT CHANGE UP (ref 0–0)
PLATELET # BLD AUTO: 202 K/UL — SIGNIFICANT CHANGE UP (ref 150–400)
POTASSIUM SERPL-MCNC: 4.2 MMOL/L — SIGNIFICANT CHANGE UP (ref 3.5–5.3)
POTASSIUM SERPL-SCNC: 4.2 MMOL/L — SIGNIFICANT CHANGE UP (ref 3.5–5.3)
RBC # BLD: 2.54 M/UL — LOW (ref 3.8–5.2)
RBC # FLD: 16 % — HIGH (ref 10.3–14.5)
SODIUM SERPL-SCNC: 141 MMOL/L — SIGNIFICANT CHANGE UP (ref 135–145)
WBC # BLD: 5.19 K/UL — SIGNIFICANT CHANGE UP (ref 3.8–10.5)
WBC # FLD AUTO: 5.19 K/UL — SIGNIFICANT CHANGE UP (ref 3.8–10.5)

## 2023-08-17 PROCEDURE — 45382 COLONOSCOPY W/CONTROL BLEED: CPT

## 2023-08-17 PROCEDURE — 99232 SBSQ HOSP IP/OBS MODERATE 35: CPT

## 2023-08-17 PROCEDURE — 88305 TISSUE EXAM BY PATHOLOGIST: CPT | Mod: 26

## 2023-08-17 RX ORDER — APIXABAN 2.5 MG/1
5 TABLET, FILM COATED ORAL
Refills: 0 | Status: DISCONTINUED | OUTPATIENT
Start: 2023-08-17 | End: 2023-08-28

## 2023-08-17 RX ORDER — SODIUM CHLORIDE 9 MG/ML
1000 INJECTION, SOLUTION INTRAVENOUS
Refills: 0 | Status: DISCONTINUED | OUTPATIENT
Start: 2023-08-17 | End: 2023-08-17

## 2023-08-17 RX ADMIN — PANTOPRAZOLE SODIUM 40 MILLIGRAM(S): 20 TABLET, DELAYED RELEASE ORAL at 17:17

## 2023-08-17 RX ADMIN — APIXABAN 5 MILLIGRAM(S): 2.5 TABLET, FILM COATED ORAL at 17:17

## 2023-08-17 RX ADMIN — SODIUM CHLORIDE 75 MILLILITER(S): 9 INJECTION, SOLUTION INTRAVENOUS at 17:17

## 2023-08-17 RX ADMIN — SODIUM CHLORIDE 100 MILLILITER(S): 9 INJECTION, SOLUTION INTRAVENOUS at 14:21

## 2023-08-17 RX ADMIN — SODIUM CHLORIDE 75 MILLILITER(S): 9 INJECTION, SOLUTION INTRAVENOUS at 08:39

## 2023-08-17 RX ADMIN — Medication 650 MILLIGRAM(S): at 23:50

## 2023-08-17 RX ADMIN — PANTOPRAZOLE SODIUM 40 MILLIGRAM(S): 20 TABLET, DELAYED RELEASE ORAL at 06:14

## 2023-08-17 RX ADMIN — SODIUM CHLORIDE 75 MILLILITER(S): 9 INJECTION, SOLUTION INTRAVENOUS at 19:35

## 2023-08-17 NOTE — PROGRESS NOTE ADULT - ASSESSMENT
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    UGIB w/ acute blood loss anemia  - CT abd showed few colonic diverticula without diverticulitis but no abnormal mass along the GI tract  - Hgb stable  - EGD on 8/16 showed no source of melena was identified  - colonoscopy on 8/17 showed internal hemorrhoids, hepatic flexure single angioectasia s/p APC, hepatic flexure 5mm polyp that removed with cold snare and mild pan-diverticulosis - will follow up pathology as outpatient - GI recommended repeat colonoscopy in 7 years  - await - Transfuse to maintain Hgb > 7  - c/w folic acid  - c/w Protonix  - as per GI- severe metabolic acidosis needs to be resolved before patient can undergo EGD     VY on CKD IV  - resolved    Metabolic acidosis  - acidosis slowly improving with bicarb drip, and now with oral bicarb  - Nephrology following     Chronic Respiratory Failure with hypoxia due to COPD/ SONU on CPAP  - c/w CPAP & O2 via NC  - c/w Spiriva, Symbicort and Proair   - patient is stable/at baseline  - pulmonary following    Hx of LE DVT   - patient cleared to resumpe AC by GI  - LE dopplers did not show DVT in either extremity    Right knee joint effusion and small right popliteal fossa Baker's cyst measure   - US showed a large right knee joint effusion with apparent internal complexity and a small right popliteal fossa Baker's cyst   - likely from severe OA  - as per ortho, no intervention    Paroxysmal Atrial fibrillation   - Echo showed EF 55 - 60% w/ grade I diastolic dysfunction w/ severe concentric left ventricular hypertrophy, moderate aortic regurgitation with borderline pulmonary hypertension  - rate controlled  - patient cleared to resumpe AC by GI    Gout  - c/w prednisone & colchicine     Prophylaxis:  DVT: ICDs  GI: Protonix

## 2023-08-17 NOTE — BRIEF OPERATIVE NOTE - OPERATION/FINDINGS
EGD report - see photos in paper chart  Indication - melena, on Eliquis (held)  Exam to d2  Esophagus - normal  Stomach - post sleeve anatomy  Duodenum - normal    Impression: post sleeve anatomy  No source of melena identified    Recommend:  - will need to discuss with patient if she is amenable to colonoscopy when she wakes from anesthesia
Colonoscopy report - see photos in paper chart  Prep adequate  Withdrawal 10 minutes  Indication - melena  Findings:  - internal hemorrhoids  - hepatic flexure single angioectasia, s/p APC  - hepatic flexure 5mm polyp removed with cold snare  - mild pan-diverticulosis  - otherwise normal colonoscopy    Recommend:   - resume diet  - no contraindication to resumption of AC tomorrow  - await pathology  - repeat colonoscopy in 7 years

## 2023-08-17 NOTE — PROGRESS NOTE ADULT - SUBJECTIVE AND OBJECTIVE BOX
Patient is a 68y old  Female who presents with GI bleeding (16 Aug 2023 18:13)    PAST MEDICAL & SURGICAL HISTORY:  HTN (hypertension)    Gout    Hypothyroid    Asthma    HSV (herpes simplex virus) infection    History of COPD  On 3LNC sometimes at home    Paroxysmal atrial fibrillation    SONU on CPAP    chronic kidney disease    Anemia    Surgery, elective  right hand - 3 fingers repairs    H/O bariatric surgery    INTERVAL HISTORY: in no acute distress   	  MEDICATIONS:  MEDICATIONS  (STANDING):  allopurinol 100 milliGRAM(s) Oral daily  bisacodyl 20 milliGRAM(s) Oral once  budesonide 160 MICROgram(s)/formoterol 4.5 MICROgram(s) Inhaler 2 Puff(s) Inhalation two times a day  colchicine 0.3 milliGRAM(s) Oral daily  folic acid 1 milliGRAM(s) Oral daily  pantoprazole  Injectable 40 milliGRAM(s) IV Push every 12 hours  predniSONE   Tablet 40 milliGRAM(s) Oral daily  sodium bicarbonate 650 milliGRAM(s) Oral three times a day  sodium chloride 0.45%. 1000 milliLiter(s) (75 mL/Hr) IV Continuous <Continuous>  tiotropium 2.5 MICROgram(s) Inhaler 2 Puff(s) Inhalation daily    MEDICATIONS  (PRN):  acetaminophen     Tablet .. 650 milliGRAM(s) Oral every 6 hours PRN Temp greater or equal to 38C (100.4F), Mild Pain (1 - 3)  albuterol    90 MICROgram(s) HFA Inhaler 2 Puff(s) Inhalation every 6 hours PRN Shortness of Breath and/or Wheezing  ondansetron Injectable 4 milliGRAM(s) IntraMuscular every 6 hours PRN Nausea and/or Vomiting    Vitals:  T(F): 97.6 (08-17-23 @ 10:53), Max: 98.4 (08-17-23 @ 04:32)  HR: 55 (08-17-23 @ 10:53) (51 - 61)  BP: 114/76 (08-17-23 @ 10:53) (108/51 - 138/71)  RR: 18 (08-17-23 @ 10:53) (14 - 19)  SpO2: 100% (08-17-23 @ 10:53) (97% - 100%)    08-16 @ 07:01  -  08-17 @ 07:00  --------------------------------------------------------  IN:  Total IN: 0 mL    OUT:    Voided (mL): 1500 mL  Total OUT: 1500 mL    Total NET: -1500 mL    Weight (kg): 107 (08-15 @ 11:46)  BMI (kg/m2): 35.9 (08-15 @ 11:46)    PHYSICAL EXAM:  Neuro: Awake, responsive  CV: S1 S2 RRR  Lungs: diminished to bases   GI: Soft, BS +, ND, NT  Extremities: Trace LE edema, Rt foot tenderness improved     RADIOLOGY:   < from: US Duplex Venous Lower Ext Complete, Bilateral (08.11.23 @ 10:52) >  No acute DVT of either lower extremity.    Large right knee joint effusion with apparent internal complexity.    Small right popliteal fossa Baker's cyst measures 2.9 x 0.8 x 1.6 cm.    Bilateral lower extremity superficial soft tissue edema, correlate   clinically.    < end of copied text >  < from: CT Abdomen and Pelvis No Cont (08.09.23 @ 18:31) >  LOWER CHEST: Within normal limits.    LIVER: Within normal limits.  BILE DUCTS: Normal caliber.  GALLBLADDER: Cholelithiasis without evidence of cholecystitis.  SPLEEN: Within normal limits.  PANCREAS: Within normal limits.  ADRENALS: Within normal limits.  KIDNEYS/URETERS: Within normal limits.    BLADDER: Within normal limits.  REPRODUCTIVE ORGANS: IUD in position. No adnexal lesion.    BOWEL: Sleeve gastrectomy. No bowel obstruction. Few colonic diverticula   without evidence of diverticulitis. Appendix is not visualized. No   evidence of inflammation in the pericecal region.  PERITONEUM: No ascites.  VESSELS: Atherosclerotic changes.  RETROPERITONEUM/LYMPH NODES: No lymphadenopathy.  ABDOMINAL WALL: Within normal limits.  BONES: Degenerative changes.    IMPRESSION:  No bowel obstruction or inflammation.  Few colonic diverticula without diverticulitis. No abnormal mass along   the GI tract.    Cholelithiasis without evidence of cholecystitis.    < end of copied text >    DIAGNOSTIC TESTING:    [x ] Echocardiogram:   < from: TTE Echo Complete w/o Contrast w/ Doppler (08.10.23 @ 14:03) >  Left Ventricle: The left ventricular internal cavity size is normal.  Global LV systolic function was normal. Left ventricular ejection   fraction, by visual estimation, is 55 to 60%. Spectral Doppler shows   impaired relaxation pattern of left ventricular myocardial filling (Grade   I diastolic dysfunction).  Right Ventricle: Normal right ventricular size and function.  Left Atrium: Mildly enlarged left atrium.  Right Atrium: The right atrium is normal in size.  Pericardium:The pericardium was not well visualized.  Mitral Valve: Mild thickening of the anterior and posterior mitral valve   leaflets. There is mild mitral annular calcification. Mild mitral valve   regurgitation is seen.  Tricuspid Valve: The tricuspid valve is not well seen. Mild tricuspid   regurgitation is visualized. Estimated pulmonary artery systolic pressure   is 35.9 mmHg assuming a right atrial pressure of 3 mmHg, which is   consistent with borderline pulmonary hypertension.  Aortic Valve: The aortic valve is trileaflet. Sclerotic aortic valve with   normal opening. Moderate aortic valve regurgitation is seen.  Pulmonic Valve: The pulmonic valve was not well visualized. Mild pulmonic   valve regurgitation.  Aorta: Aortic root measured at Sinus of Valsalva is normal.  Pulmonary Artery: The pulmonary artery is not well seen.      Summary:   1. Left ventricular ejection fraction, by visual estimation, is 55 to   60%.   2. Normal global left ventricular systolic function.   3. Normal left ventricular internal cavity size.   4. Spectral Doppler shows impaired relaxation pattern of left   ventricular myocardial filling (Grade I diastolic dysfunction).   5. There is severe concentric left ventricular hypertrophy.   6. Normal right ventricular size and function.   7. Mildly enlarged left atrium.   8. Mild thickening of the anterior and posterior mitral valve leaflets.   9. Moderate aortic regurgitation.  10. Sclerotic aortic valve with normal opening.  11. Estimated pulmonary artery systolic pressure is 35.9 mmHg assuming a   right atrial pressure of 3 mmHg, which is consistent with borderline   pulmonary hypertension.    < end of copied text >  [x ] Cardiac Catheterization: < from: Cardiac Catheterization (05.27.22 @ 15:06) >  Coronary Angiography   The coronary circulation is right dominant.      LM   Left main artery: Angiography shows no disease.      LAD   Left anterior descending artery: Angiography shows no disease.      CX   Circumflex: Angiography shows no disease.      RCA   Right coronary artery: Angiography shows no disease.      < end of copied text >    LABS:	 	    16 Aug 2023 07:34    139    |  114    |  24     ----------------------------<  97     4.4     |  21     |  2.66   15 Aug 2023 06:45    136    |  109    |  24     ----------------------------<  113    4.8     |  21     |  2.80     Ca    8.1        16 Aug 2023 07:34  Phos  3.5       16 Aug 2023 07:34  Mg     2.0       16 Aug 2023 07:34    TPro  6.5    /  Alb  2.5    /  TBili  0.6    /  DBili  x      /  AST  55     /  ALT  85     /  AlkPhos  455    16 Aug 2023 07:34                        7.9    6.81  )-----------( 195      ( 16 Aug 2023 07:34 )             25.0 ,                       8.3    6.93  )-----------( 191      ( 15 Aug 2023 06:45 )             25.7   TSH: Thyroid Stimulating Hormone, Serum: 2.820 uU/mL (08-16 @ 07:34)

## 2023-08-17 NOTE — PROGRESS NOTE ADULT - SUBJECTIVE AND OBJECTIVE BOX
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 She is lying in bed in NAD.     MEDICATIONS  (STANDING):  allopurinol 100 milliGRAM(s) Oral daily  apixaban 5 milliGRAM(s) Oral <User Schedule>  bisacodyl 20 milliGRAM(s) Oral once  budesonide 160 MICROgram(s)/formoterol 4.5 MICROgram(s) Inhaler 2 Puff(s) Inhalation two times a day  colchicine 0.3 milliGRAM(s) Oral daily  folic acid 1 milliGRAM(s) Oral daily  pantoprazole  Injectable 40 milliGRAM(s) IV Push every 12 hours  predniSONE   Tablet 40 milliGRAM(s) Oral daily  sodium bicarbonate 650 milliGRAM(s) Oral three times a day  sodium chloride 0.45%. 1000 milliLiter(s) (75 mL/Hr) IV Continuous <Continuous>  tiotropium 2.5 MICROgram(s) Inhaler 2 Puff(s) Inhalation daily    MEDICATIONS  (PRN):  acetaminophen     Tablet .. 650 milliGRAM(s) Oral every 6 hours PRN Temp greater or equal to 38C (100.4F), Mild Pain (1 - 3)  albuterol    90 MICROgram(s) HFA Inhaler 2 Puff(s) Inhalation every 6 hours PRN Shortness of Breath and/or Wheezing  ondansetron Injectable 4 milliGRAM(s) IntraMuscular every 6 hours PRN Nausea and/or Vomiting      Allergies    No Known Allergies    Intolerances        Vital Signs Last 24 Hrs  T(C): 36.4 (17 Aug 2023 17:59), Max: 36.9 (17 Aug 2023 04:32)  T(F): 97.5 (17 Aug 2023 17:59), Max: 98.4 (17 Aug 2023 04:32)  HR: 62 (17 Aug 2023 17:59) (51 - 70)  BP: 105/65 (17 Aug 2023 17:59) (98/66 - 138/71)  BP(mean): --  RR: 18 (17 Aug 2023 17:59) (13 - 18)  SpO2: 100% (17 Aug 2023 17:59) (94% - 100%)    Parameters below as of 17 Aug 2023 17:59  Patient On (Oxygen Delivery Method): nasal cannula  O2 Flow (L/min): 3      PHYSICAL EXAM:  GENERAL: NAD   HEAD:  Atraumatic, Normocephalic  EYES: EOMI, PERRLA   NECK: Supple   NERVOUS SYSTEM:  Alert & Oriented X3, Good concentration   CHEST/LUNG: Clear to auscultation bilaterally; No rales, rhonchi, wheezing, or rubs  HEART: Regular rate and rhythm; No murmurs, rubs, or gallops  ABDOMEN: Soft, Nontender, Nondistended; Bowel sounds present  EXTREMITIES: bilateral foot tenderness, R>L improved from yesterday    LABS:                        8.0    5.19  )-----------( 202      ( 17 Aug 2023 11:15 )             26.7     08-17    141  |  114<H>  |  23  ----------------------------<  83  4.2   |  22  |  2.62<H>    Ca    8.1<L>      17 Aug 2023 11:15  Phos  3.5     08-16  Mg     2.0     08-16    TPro  6.5  /  Alb  2.5<L>  /  TBili  0.6  /  DBili  x   /  AST  55<H>  /  ALT  85<H>  /  AlkPhos  455<H>  08-16      Urinalysis Basic - ( 17 Aug 2023 11:15 )    Color: x / Appearance: x / SG: x / pH: x  Gluc: 83 mg/dL / Ketone: x  / Bili: x / Urobili: x   Blood: x / Protein: x / Nitrite: x   Leuk Esterase: x / RBC: x / WBC x   Sq Epi: x / Non Sq Epi: x / Bacteria: x         RADIOLOGY & ADDITIONAL TESTS:    08-16-23 @ 07:01  -  08-17-23 @ 07:00  --------------------------------------------------------  IN:  Total IN: 0 mL    OUT:    Voided (mL): 1500 mL  Total OUT: 1500 mL    Total NET: -1500 mL      08-17-23 @ 07:01  -  08-17-23 @ 20:13  --------------------------------------------------------  IN:  Total IN: 0 mL    OUT:    Voided (mL): 600 mL  Total OUT: 600 mL    Total NET: -600 mL

## 2023-08-17 NOTE — PROGRESS NOTE ADULT - NS ATTEND AMEND GEN_ALL_CORE FT
pt seen and examined   labs and vitals reviewed  agree with above assessment and plan  s/p endoscopy without cv issues  pt feeling well  cont care per primary team
pt seen and examined post EGD this afternoon    no acute events  s/p EGD this afternoon - non diagnostic: post gastric sleeve anatomy, normal esophagus and duodenum   plan for colonoscopy tomorrow with GI, to get bowel prep tonight  has not had any further melena since admission  no SOB  on 2L NC  did not use CPAP overnight  R foot gout pain improving: on colchicine and steroids  Cr downtrending  LFTs elevated    68F HTN, gout, hypothyroidism, L. DVT, atrial fibrillation, COPD on home O2 (prior environmental exposure as [place] ), SONU on CPAP, obesity s/p gastric sleeve, chronic anemia here with melena, acute on chronic anemia, generalized weakness. Seen by GI with plans for endoscopy. Requesting pulmonary clearance. Found to have severe metabolic acidosis pH 7.1, HCO3: 10. Hospital course with acute gout flare. s/p EGD 8/16 without acute findings and unable to identify cause/source of melena. For colonoscopy tomorrow. LFTs elevated.    DX: GI bleed, anemia due to blood loss, melena, metabolic acidosis, VY on CKD, COPD with oxygen dependence, chronic hypoxic respiratory failure, SONU on CPAP, obesity, gout flare, elevated LFTs    - stable respiratory status  - tolerated EGD  - on 2L NC supplementation  - cont hospital interchange for home trelegy with symbicort and spiriva  - encouraged compliance with nocturnal NIV for underlying SONU  - full dose AC on hold due to recent GI bleed, no further bleeding since admission  - s/p 1 unit pRBC 8/10  - metabolic acidosis improved with improving renal function and bicarb supplementation  - unclear why liver enzymes elevated (but now downtrending) ?medication induced (colchicine), would cont to monitor levels  - on protonix twice daily in setting of concerns for upper GI bleed, however EGD overall normal, can likely change to protonix daily  - NPO after midnight, for colonoscopy tomorrow
patient seen and examined   labs and vitals reviewed  agree with above assessment and plan
pt seen and examined   labs and vitals reviewed   agree with above assessment and plan
pt seen and examined with NP    no acute events  blood gas overall improving but still with mild metabolic acidosis  no further melena   no SOB  on 2L NC  did not wear NIV overnight  pending colonoscopy      68F HTN, gout, hypothyroidism, L. DVT, atrial fibrillation, COPD on home O2 (from exposure [place] ), SONU on CPAP, obesity s/p gastric sleeve, chronic anemia here with melena, acute on chronic anemia. Seen by GI with plans for endoscopy. Requesting pulmonary clearance. Found to have severe metabolic acidosis pH 7.1, HCO3: 10. Hospital course with acute gout flare.    DX: GI bleed, anemia due to blood loss, melena, metabolic acidosis, , VY on CKD, COPD with oxygen dependence, chronic hypoxic respiratory failure, SONU on CPAP, obesity, gout    - cont bronchodilators for underlying COPD: on home trelegy, will give hospital equivalent (symbicort + spiriva), albuterol prn  - cont nocturnal CPAP for sleep apnea encouraged compliance  - on 2L NC O2 supplementation (baseline uses 3L at home)  - maintain O2 sat in the low 90s  - lung exam clear  - she has hx of reintubation post gastric sleeve secondary to hypoxia and was successfully extubated the following day  - admission VBG with pH 7.1, CO2: 29, Po2: 56, HCO3: 10  - she has severe metabolic acidosis with compensatory respiratory alkalosis  - s/p bicarb drip transitioned to po bicarb  - Cr elevated compared to prior labs, VY on CKD now improving  - pt reports she had multiple bouts of BM with melena prior to coming to hospital, likely component of dehydration with VY and metabolic acidosis from bicarb loss with frequent stooling.   - repeat VBG, if acidosis continues to improve may proceed with colonoscopy   - Her SOB likely multifactorial (improved since admission): underlying COPD, acute on chronic anemia, with some SOB from respiratory compensation of metabolic acidosis   - ?gout flare: on colchicine. ? start systemic steroids and avoid NSAIDs with recent VY and melena. prednisone 40mg daily x5 days should suffice  - monitor for further bleeding and H/H trend: s/p 1 unit pRBC 8/10  - GI follow up for EGD  - renal follow up

## 2023-08-17 NOTE — PROGRESS NOTE ADULT - ASSESSMENT
68F HTN, hypothyroidism, Afib on Eliquis, COPD on O2, SONU who presented with several days of melena and LOMAX with acute on chronic anemia.  s/p one unit of PRBC transfusion 8/10  h/h remains low but steady   initial symptoms of chest pressure and LOMAX, likely due to anemia vs. intra-abdominal process. all symptoms currently resolved.     -cont with supplemental O2, inhalers for COPD  -?gout flare, continued on colchicine renal dose/prednisone, Rt foot pain and swelling much improved  -Recent cardiac catheterization with no evidence of CAD  -TTE with preserved EF, Grade I DD, mod AR  -currently holding  AC  -s/p upper endoscopy 8/16: No source of melena identified, awaiting colonoscopy

## 2023-08-17 NOTE — PROGRESS NOTE ADULT - SUBJECTIVE AND OBJECTIVE BOX
24 hr events  s/p colonoscopy with noted internal hemorrhoids, hepatic flexure single angioectasia, s/p APC  hepatic flexure 5mm polyp removed with cold snare, mild pan-diverticulosis  tolerated procedure well  no respiratory complaints  on 3L NC        ROS  no fever, no chills  no HA, no dizziness  no visual changes, no auditory changes  no sore throat, no sinus congestion  no SOB, no cough  no chest pain, no palpitations  no abdominal pain, no N/V/D  no dysuria, no hematuria  R ankle swelling improved  no rashes, no pruritis        MEDICATIONS  (STANDING):  allopurinol 100 milliGRAM(s) Oral daily  bisacodyl 20 milliGRAM(s) Oral once  budesonide 160 MICROgram(s)/formoterol 4.5 MICROgram(s) Inhaler 2 Puff(s) Inhalation two times a day  colchicine 0.3 milliGRAM(s) Oral daily  folic acid 1 milliGRAM(s) Oral daily  pantoprazole  Injectable 40 milliGRAM(s) IV Push every 12 hours  predniSONE   Tablet 40 milliGRAM(s) Oral daily  sodium bicarbonate 650 milliGRAM(s) Oral three times a day  sodium chloride 0.45%. 1000 milliLiter(s) (75 mL/Hr) IV Continuous <Continuous>  tiotropium 2.5 MICROgram(s) Inhaler 2 Puff(s) Inhalation daily    MEDICATIONS  (PRN):  acetaminophen     Tablet .. 650 milliGRAM(s) Oral every 6 hours PRN Temp greater or equal to 38C (100.4F), Mild Pain (1 - 3)  albuterol    90 MICROgram(s) HFA Inhaler 2 Puff(s) Inhalation every 6 hours PRN Shortness of Breath and/or Wheezing  ondansetron Injectable 4 milliGRAM(s) IntraMuscular every 6 hours PRN Nausea and/or Vomiting      LABS                  7.9    6.81  )-----------( 195      ( 16 Aug 2023 07:34 )             25.0             139    |  114    |  24     ----------------------------<  97     4.4     |  21     |  2.66       Ca    8.1        16 Aug 2023 07:34  Phos  3.5       16 Aug 2023 07:34  Mg     2.0       16 Aug 2023 07:34    TPro  6.5    /  Alb  2.5    /  TBili  0.6    /  DBili  x      /  AST  55     /  ALT  85     /  AlkPhos  455    16 Aug 2023 07:34      VITALS  T(F): 97.6 (08-17-23 @ 10:53), Max: 98.4 (08-17-23 @ 04:32)  HR: 55 (08-17-23 @ 10:53) (51 - 61)  BP: 114/76 (08-17-23 @ 10:53) (108/51 - 138/71)  RR: 18 (08-17-23 @ 10:53) (14 - 19)  SpO2: 100% (08-17-23 @ 10:53) (97% - 100%)          EXAM  GEN: NAD, obese, comfortable in bed  HEENT: NC/AT, EOMI, sclera white  CV: RRR  PULM: CTA bilaterally, no wheeze, no rhonchi  ABD: obese, NT, ND, + BS  EXT: no edema/cyanosis, R lateral ankle swelling improving  NEURO: AAOx3

## 2023-08-18 LAB — SURGICAL PATHOLOGY STUDY: SIGNIFICANT CHANGE UP

## 2023-08-18 PROCEDURE — 99232 SBSQ HOSP IP/OBS MODERATE 35: CPT

## 2023-08-18 RX ORDER — ERYTHROPOIETIN 10000 [IU]/ML
10000 INJECTION, SOLUTION INTRAVENOUS; SUBCUTANEOUS ONCE
Refills: 0 | Status: DISCONTINUED | OUTPATIENT
Start: 2023-08-18 | End: 2023-08-18

## 2023-08-18 RX ORDER — ERYTHROPOIETIN 10000 [IU]/ML
10000 INJECTION, SOLUTION INTRAVENOUS; SUBCUTANEOUS ONCE
Refills: 0 | Status: COMPLETED | OUTPATIENT
Start: 2023-08-18 | End: 2023-08-18

## 2023-08-18 RX ADMIN — PANTOPRAZOLE SODIUM 40 MILLIGRAM(S): 20 TABLET, DELAYED RELEASE ORAL at 05:09

## 2023-08-18 RX ADMIN — Medication 100 MILLIGRAM(S): at 12:14

## 2023-08-18 RX ADMIN — APIXABAN 5 MILLIGRAM(S): 2.5 TABLET, FILM COATED ORAL at 15:13

## 2023-08-18 RX ADMIN — ERYTHROPOIETIN 10000 UNIT(S): 10000 INJECTION, SOLUTION INTRAVENOUS; SUBCUTANEOUS at 15:13

## 2023-08-18 RX ADMIN — Medication 650 MILLIGRAM(S): at 15:12

## 2023-08-18 RX ADMIN — Medication 40 MILLIGRAM(S): at 05:09

## 2023-08-18 RX ADMIN — Medication 1 MILLIGRAM(S): at 12:14

## 2023-08-18 RX ADMIN — Medication 650 MILLIGRAM(S): at 06:51

## 2023-08-18 RX ADMIN — Medication 650 MILLIGRAM(S): at 22:39

## 2023-08-18 RX ADMIN — Medication 0.3 MILLIGRAM(S): at 12:14

## 2023-08-18 RX ADMIN — PANTOPRAZOLE SODIUM 40 MILLIGRAM(S): 20 TABLET, DELAYED RELEASE ORAL at 17:30

## 2023-08-18 RX ADMIN — APIXABAN 5 MILLIGRAM(S): 2.5 TABLET, FILM COATED ORAL at 05:08

## 2023-08-18 NOTE — PROGRESS NOTE ADULT - SUBJECTIVE AND OBJECTIVE BOX
24 hr events  no acute events  tolerating po diet  no SOB  s/p colonoscopy yesterday: tolerated procedure well, noted with:  - internal hemorrhoids  - hepatic flexure single angioectasia, s/p APC  - hepatic flexure 5mm polyp removed   - mild pan-diverticulosis  no further melena  R foot pain improved      ROS  no fever, no chills  no HA, no dizziness  no visual changes, no auditory changes  no sore throat, no sinus congestion  no SOB, no cough  no chest pain, no palpitations  no abdominal pain, no N/V/D  no dysuria, no hematuria  no myalgias, no arthralgias  improved R ankle swelling and pain  no rashes, no pruritis    MEDICATIONS  (STANDING):  allopurinol 100 milliGRAM(s) Oral daily  apixaban 5 milliGRAM(s) Oral <User Schedule>  bisacodyl 20 milliGRAM(s) Oral once  budesonide 160 MICROgram(s)/formoterol 4.5 MICROgram(s) Inhaler 2 Puff(s) Inhalation two times a day  colchicine 0.3 milliGRAM(s) Oral daily  folic acid 1 milliGRAM(s) Oral daily  pantoprazole  Injectable 40 milliGRAM(s) IV Push every 12 hours  sodium bicarbonate 650 milliGRAM(s) Oral three times a day  tiotropium 2.5 MICROgram(s) Inhaler 2 Puff(s) Inhalation daily    MEDICATIONS  (PRN):  acetaminophen     Tablet .. 650 milliGRAM(s) Oral every 6 hours PRN Temp greater or equal to 38C (100.4F), Mild Pain (1 - 3)  albuterol    90 MICROgram(s) HFA Inhaler 2 Puff(s) Inhalation every 6 hours PRN Shortness of Breath and/or Wheezing  ondansetron Injectable 4 milliGRAM(s) IntraMuscular every 6 hours PRN Nausea and/or Vomiting      LABS                        8.0    5.19  )-----------( 202      ( 17 Aug 2023 11:15 )             26.7       08-17    141  |  114<H>  |  23  ----------------------------<  83  4.2   |  22  |  2.62<H>    Ca    8.1<L>      17 Aug 2023 11:15      Uric Acid (08.13.23 @ 06:05)    Uric Acid: 8.2 mg/dL      Vital Signs Last 24 Hrs  T(C): 36.4 (18 Aug 2023 16:42), Max: 37.2 (18 Aug 2023 04:33)  T(F): 97.6 (18 Aug 2023 16:42), Max: 98.9 (18 Aug 2023 04:33)  HR: 65 (18 Aug 2023 16:42) (56 - 65)  BP: 113/66 (18 Aug 2023 16:42) (96/59 - 113/66)   RR: 18 (18 Aug 2023 16:42) (18 - 19)  SpO2: 100% (18 Aug 2023 16:42) (97% - 100%)    Parameters below as of 18 Aug 2023 16:42  Patient On (Oxygen Delivery Method): nasal cannula    EXAM  GEN: obese female, NAD, comfortable in bed  HEENT: NC/AT, EOMI, sclera white  CV: RRR  PULM: CTA bilaterally, no wheeze, no rhonchi  ABD: soft, NT, ND, + BS + obese  EXT: no edema/cyanosis, R lateral ankle edema improved  NEURO: AAOx3, moves all extremities

## 2023-08-18 NOTE — PROGRESS NOTE ADULT - SUBJECTIVE AND OBJECTIVE BOX
24H hour events:   pt resting  no acute events overnight  no complaints this am  MEDICATIONS:  apixaban 5 milliGRAM(s) Oral <User Schedule>      albuterol    90 MICROgram(s) HFA Inhaler 2 Puff(s) Inhalation every 6 hours PRN  budesonide 160 MICROgram(s)/formoterol 4.5 MICROgram(s) Inhaler 2 Puff(s) Inhalation two times a day  tiotropium 2.5 MICROgram(s) Inhaler 2 Puff(s) Inhalation daily    acetaminophen     Tablet .. 650 milliGRAM(s) Oral every 6 hours PRN  ondansetron Injectable 4 milliGRAM(s) IntraMuscular every 6 hours PRN    bisacodyl 20 milliGRAM(s) Oral once  pantoprazole  Injectable 40 milliGRAM(s) IV Push every 12 hours    allopurinol 100 milliGRAM(s) Oral daily  colchicine 0.3 milliGRAM(s) Oral daily    folic acid 1 milliGRAM(s) Oral daily  sodium bicarbonate 650 milliGRAM(s) Oral three times a day          PHYSICAL EXAM:  T(C): 37.2 (08-18-23 @ 04:33), Max: 37.2 (08-18-23 @ 04:33)  HR: 62 (08-18-23 @ 05:10) (55 - 70)  BP: 102/56 (08-18-23 @ 04:33) (96/59 - 131/68)  RR: 18 (08-18-23 @ 04:33) (13 - 18)  SpO2: 99% (08-18-23 @ 05:10) (94% - 100%)  Wt(kg): --  I&O's Summary    17 Aug 2023 07:01  -  18 Aug 2023 07:00  --------------------------------------------------------  IN: 0 mL / OUT: 900 mL / NET: -900 mL        Appearance: Normal	  HEENT:   Normal oral mucosa, PERRL, EOMI	  Lymphatic: No lymphadenopathy  Cardiovascular: Normal S1 S2, No JVD, No murmurs, No edema  Respiratory: Lungs grossly clear to auscultation	  Psychiatry: A & O x 3, Mood & affect appropriate  Gastrointestinal:  Soft, Non-tender, + BS	  Skin: No rashes, No ecchymoses, No cyanosis	  Neurologic: Non-focal  Extremities: Normal range of motion, No clubbing, cyanosis       LABS:	 	    CBC Full  -  ( 17 Aug 2023 11:15 )  WBC Count : 5.19 K/uL  Hemoglobin : 8.0 g/dL  Hematocrit : 26.7 %  Platelet Count - Automated : 202 K/uL  Mean Cell Volume : 105.1 fl  Mean Cell Hemoglobin : 31.5 pg  Mean Cell Hemoglobin Concentration : 30.0 g/dL  Auto Neutrophil # : x  Auto Lymphocyte # : x  Auto Monocyte # : x  Auto Eosinophil # : x  Auto Basophil # : x  Auto Neutrophil % : x  Auto Lymphocyte % : x  Auto Monocyte % : x  Auto Eosinophil % : x  Auto Basophil % : x    08-17    141  |  114<H>  |  23  ----------------------------<  83  4.2   |  22  |  2.62<H>    Ca    8.1<L>      17 Aug 2023 11:15        proBNP:   Lipid Profile:   HgA1c:   TSH:       CARDIAC MARKERS:            TELEMETRY: 	    ECG:  	  RADIOLOGY:  OTHER: 	    PREVIOUS DIAGNOSTIC TESTING:    [ ] Echocardiogram:  [ ]  Catheterization:  [ ] Stress Test:  	  	  ASSESSMENT/PLAN:

## 2023-08-18 NOTE — PROGRESS NOTE ADULT - ASSESSMENT
68F HTN, hypothyroidism, Afib on Eliquis, COPD on O2, SONU who presented with several days of melena and LOMAX with acute on chronic anemia.  s/p one unit of PRBC transfusion 8/10  h/h remains low but steady   initial symptoms of chest pressure and LOMAX, likely due to anemia vs. intra-abdominal process. all symptoms currently resolved.     -cont with supplemental O2, inhalers for COPD  -?gout flare, continued on colchicine renal dose/prednisone, Rt foot pain and swelling much improved  -Recent cardiac catheterization with no evidence of CAD  -TTE with preserved EF, Grade I DD, mod AR  -currently holding  AC  -s/p endoscopy without cv issues  -cont supportive care per primary team

## 2023-08-18 NOTE — PROGRESS NOTE ADULT - SUBJECTIVE AND OBJECTIVE BOX
[facility] NEPHROLOGY SERVICES, [facility]  NEPHROLOGY AND HYPERTENSION  [address]  [address]  [place] [address]  [phone]    MD ANNE MARIE HEWITT MD YELENA ROSENBERG, MD BINNY KOSHY, MD CHRISTOPHER CAPUTO, MD EDWARD BOVER, MD          Patient events noted  No distress  feels well     MEDICATIONS  (STANDING):  allopurinol 100 milliGRAM(s) Oral daily  apixaban 5 milliGRAM(s) Oral <User Schedule>  bisacodyl 20 milliGRAM(s) Oral once  budesonide 160 MICROgram(s)/formoterol 4.5 MICROgram(s) Inhaler 2 Puff(s) Inhalation two times a day  colchicine 0.3 milliGRAM(s) Oral daily  folic acid 1 milliGRAM(s) Oral daily  pantoprazole  Injectable 40 milliGRAM(s) IV Push every 12 hours  sodium bicarbonate 650 milliGRAM(s) Oral three times a day  tiotropium 2.5 MICROgram(s) Inhaler 2 Puff(s) Inhalation daily    MEDICATIONS  (PRN):  acetaminophen     Tablet .. 650 milliGRAM(s) Oral every 6 hours PRN Temp greater or equal to 38C (100.4F), Mild Pain (1 - 3)  albuterol    90 MICROgram(s) HFA Inhaler 2 Puff(s) Inhalation every 6 hours PRN Shortness of Breath and/or Wheezing  ondansetron Injectable 4 milliGRAM(s) IntraMuscular every 6 hours PRN Nausea and/or Vomiting      08-17-23 @ 07:01  -  08-18-23 @ 07:00  --------------------------------------------------------  IN: 0 mL / OUT: 900 mL / NET: -900 mL    08-18-23 @ 07:01  -  08-18-23 @ 17:32  --------------------------------------------------------  IN: 660 mL / OUT: 600 mL / NET: 60 mL      PHYSICAL EXAM:      T(C): 36.4 (08-18-23 @ 16:42), Max: 37.2 (08-18-23 @ 04:33)  HR: 65 (08-18-23 @ 16:42) (56 - 65)  BP: 113/66 (08-18-23 @ 16:42) (96/59 - 113/66)  RR: 18 (08-18-23 @ 16:42) (18 - 19)  SpO2: 100% (08-18-23 @ 16:42) (97% - 100%)  Wt(kg): --  Lungs clear  Heart S1S2  Abd soft NT ND  Extremities:   1  edema                                    8.0    5.19  )-----------( 202      ( 17 Aug 2023 11:15 )             26.7     08-17    141  |  114<H>  |  23  ----------------------------<  83  4.2   |  22  |  2.62<H>    Ca    8.1<L>      17 Aug 2023 11:15          Creatinine Trend: 2.62<--, 2.66<--, 2.80<--, 2.99<--, 3.01<--, 3.17<--        Impression  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

## 2023-08-18 NOTE — PROGRESS NOTE ADULT - ASSESSMENT
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    UGIB w/ acute blood loss anemia  - CT abd showed few colonic diverticula without diverticulitis but no abnormal mass along the GI tract  - Hgb stable  - EGD on 8/16 showed no source of melena was identified  - colonoscopy on 8/17 showed internal hemorrhoids, hepatic flexure single angioectasia s/p APC, hepatic flexure 5mm polyp that removed with cold snare and mild pan-diverticulosis - will follow up pathology as outpatient - GI recommended repeat colonoscopy in 7 years  - await - Transfuse to maintain Hgb > 7  - c/w folic acid  - c/w Protonix  - as per GI- severe metabolic acidosis needs to be resolved before patient can undergo EGD     VY on CKD IV  - resolved    Metabolic acidosis  - acidosis slowly improving with bicarb drip, and now with oral bicarb  - Nephrology following     Chronic Respiratory Failure with hypoxia due to COPD/ SONU on CPAP  - c/w CPAP & O2 via NC  - c/w Spiriva, Symbicort and Proair   - patient is stable/at baseline  - pulmonary following    Hx of LE DVT   - patient cleared to resumpe AC by GI  - LE dopplers did not show DVT in either extremity    Right knee joint effusion and small right popliteal fossa Baker's cyst measure   - US showed a large right knee joint effusion with apparent internal complexity and a small right popliteal fossa Baker's cyst   - likely from severe OA  - as per ortho, no intervention    Paroxysmal Atrial fibrillation   - Echo showed EF 55 - 60% w/ grade I diastolic dysfunction w/ severe concentric left ventricular hypertrophy, moderate aortic regurgitation with borderline pulmonary hypertension  - rate controlled  - patient cleared to resume AC by GI - Eliquis resumed    Gout  - c/w colchicine   - status post prednisone    Prophylaxis:  DVT: Eliquis  GI: Protonix    Pt medically stable and pending placement.

## 2023-08-18 NOTE — PROGRESS NOTE ADULT - SUBJECTIVE AND OBJECTIVE BOX
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 She is lying in bed in NAD.      MEDICATIONS  (STANDING):  allopurinol 100 milliGRAM(s) Oral daily  apixaban 5 milliGRAM(s) Oral <User Schedule>  bisacodyl 20 milliGRAM(s) Oral once  budesonide 160 MICROgram(s)/formoterol 4.5 MICROgram(s) Inhaler 2 Puff(s) Inhalation two times a day  colchicine 0.3 milliGRAM(s) Oral daily  folic acid 1 milliGRAM(s) Oral daily  pantoprazole  Injectable 40 milliGRAM(s) IV Push every 12 hours  sodium bicarbonate 650 milliGRAM(s) Oral three times a day  tiotropium 2.5 MICROgram(s) Inhaler 2 Puff(s) Inhalation daily    MEDICATIONS  (PRN):  acetaminophen     Tablet .. 650 milliGRAM(s) Oral every 6 hours PRN Temp greater or equal to 38C (100.4F), Mild Pain (1 - 3)  albuterol    90 MICROgram(s) HFA Inhaler 2 Puff(s) Inhalation every 6 hours PRN Shortness of Breath and/or Wheezing  ondansetron Injectable 4 milliGRAM(s) IntraMuscular every 6 hours PRN Nausea and/or Vomiting      Allergies    No Known Allergies    Intolerances        Vital Signs Last 24 Hrs  T(C): 36.4 (18 Aug 2023 16:42), Max: 37.2 (18 Aug 2023 04:33)  T(F): 97.6 (18 Aug 2023 16:42), Max: 98.9 (18 Aug 2023 04:33)  HR: 65 (18 Aug 2023 16:42) (56 - 65)  BP: 113/66 (18 Aug 2023 16:42) (96/59 - 113/66)   RR: 18 (18 Aug 2023 16:42) (18 - 19)  SpO2: 100% (18 Aug 2023 16:42) (97% - 100%)    Parameters below as of 18 Aug 2023 16:42  Patient On (Oxygen Delivery Method): nasal cannula        PHYSICAL EXAM:  GENERAL: NAD   HEAD:  Atraumatic, Normocephalic  EYES: EOMI, PERRLA   NECK: Supple   NERVOUS SYSTEM:  Alert & Oriented X3, Good concentration   CHEST/LUNG: Clear to auscultation bilaterally; No rales, rhonchi, wheezing, or rubs  HEART: Regular rate and rhythm; No murmurs, rubs, or gallops  ABDOMEN: Soft, Nontender, Nondistended; Bowel sounds present  EXTREMITIES: bilateral foot tenderness, R>L improved from yesterday    LABS:                        8.0    5.19  )-----------( 202      ( 17 Aug 2023 11:15 )             26.7     08-17    141  |  114<H>  |  23  ----------------------------<  83  4.2   |  22  |  2.62<H>    Ca    8.1<L>      17 Aug 2023 11:15        Urinalysis Basic - ( 17 Aug 2023 11:15 )    Color: x / Appearance: x / SG: x / pH: x  Gluc: 83 mg/dL / Ketone: x  / Bili: x / Urobili: x   Blood: x / Protein: x / Nitrite: x   Leuk Esterase: x / RBC: x / WBC x   Sq Epi: x / Non Sq Epi: x / Bacteria: x       RADIOLOGY & ADDITIONAL TESTS:    08-17-23 @ 07:01  -  08-18-23 @ 07:00  --------------------------------------------------------  IN:  Total IN: 0 mL    OUT:    Voided (mL): 900 mL  Total OUT: 900 mL    Total NET: -900 mL      08-18-23 @ 07:01  -  08-18-23 @ 20:26  --------------------------------------------------------  IN:    Oral Fluid: 660 mL  Total IN: 660 mL    OUT:    Voided (mL): 600 mL  Total OUT: 600 mL    Total NET: 60 mL

## 2023-08-18 NOTE — PROGRESS NOTE ADULT - ASSESSMENT
68F HTN, gout, hypothyroidism, L. DVT, atrial fibrillation, COPD on home O2 (prior environmental exposure as metro ), SONU on CPAP, obesity s/p gastric sleeve, chronic anemia here with melena, acute on chronic anemia, generalized weakness. Found to have severe metabolic acidosis pH 7.1, HCO3: 10 s/p bicarb drip and bicarb po supplement. Hospital course with acute gout flare started on prednisone. Seen by GI s/p EGD 8/16 without acute findings and unable to identify cause/source of melena. s/p colonoscopy 8/17 with internal hemorrhoids, hepatic flexure angioectasia s/p APC, and removal of hepatic flexure 5mm polyp. Noted to have LFT elevation on blood work.    DX: GI bleed, anemia due to blood loss, melena, metabolic acidosis, VY on CKD, COPD with oxygen dependence, chronic hypoxic respiratory failure, SONU on CPAP, obesity, gout flare, elevated LFTs, hepatic flexure polyp and antioectasia    - stable respiratory status  - tolerated EGD and colonoscopy well from respiratory standpoint, no respiratory complications  - on 2-3L NC supplementation  - cont hospital interchange for home trelegy with symbicort and spiriva, upon disharge can resume home trelegy  - encouraged compliance with nocturnal NIV for underlying SONU  - full dose AC resumed, monitor H/H  - s/p 1 unit pRBC 8/10  - metabolic acidosis improved with improving renal function and bicarb supplementation  - unclear why liver enzymes elevated (but continues to downtrend) ?medication induced (colchicine), would cont to monitor levels, if remains elevated abdominal US  - on protonix twice daily in setting of concerns for upper GI bleed, however EGD overall normal, can likely change to protonix daily, recommend changing to po route as well  - tolerating po diet  - completed 5 day course of prednisone for gout  - no acute pulmonary issues  - reconsult as needed

## 2023-08-19 LAB
ANION GAP SERPL CALC-SCNC: 6 MMOL/L — SIGNIFICANT CHANGE UP (ref 5–17)
BUN SERPL-MCNC: 24 MG/DL — HIGH (ref 7–23)
CALCIUM SERPL-MCNC: 7.7 MG/DL — LOW (ref 8.5–10.1)
CHLORIDE SERPL-SCNC: 111 MMOL/L — HIGH (ref 96–108)
CO2 SERPL-SCNC: 23 MMOL/L — SIGNIFICANT CHANGE UP (ref 22–31)
CREAT SERPL-MCNC: 2.63 MG/DL — HIGH (ref 0.5–1.3)
EGFR: 19 ML/MIN/1.73M2 — LOW
FERRITIN SERPL-MCNC: 383 NG/ML — HIGH (ref 13–330)
GLUCOSE SERPL-MCNC: 83 MG/DL — SIGNIFICANT CHANGE UP (ref 70–99)
HCT VFR BLD CALC: 26.6 % — LOW (ref 34.5–45)
HGB BLD-MCNC: 8 G/DL — LOW (ref 11.5–15.5)
IRON SATN MFR SERPL: 23 % — SIGNIFICANT CHANGE UP (ref 14–50)
IRON SATN MFR SERPL: 47 UG/DL — SIGNIFICANT CHANGE UP (ref 30–160)
MCHC RBC-ENTMCNC: 30.1 G/DL — LOW (ref 32–36)
MCHC RBC-ENTMCNC: 32 PG — SIGNIFICANT CHANGE UP (ref 27–34)
MCV RBC AUTO: 106.4 FL — HIGH (ref 80–100)
NRBC # BLD: 0 /100 WBCS — SIGNIFICANT CHANGE UP (ref 0–0)
PLATELET # BLD AUTO: 224 K/UL — SIGNIFICANT CHANGE UP (ref 150–400)
POTASSIUM SERPL-MCNC: 3.7 MMOL/L — SIGNIFICANT CHANGE UP (ref 3.5–5.3)
POTASSIUM SERPL-SCNC: 3.7 MMOL/L — SIGNIFICANT CHANGE UP (ref 3.5–5.3)
RBC # BLD: 2.5 M/UL — LOW (ref 3.8–5.2)
RBC # FLD: 15.9 % — HIGH (ref 10.3–14.5)
SODIUM SERPL-SCNC: 140 MMOL/L — SIGNIFICANT CHANGE UP (ref 135–145)
TIBC SERPL-MCNC: 211 UG/DL — LOW (ref 220–430)
UIBC SERPL-MCNC: 163 UG/DL — SIGNIFICANT CHANGE UP (ref 110–370)
WBC # BLD: 7.44 K/UL — SIGNIFICANT CHANGE UP (ref 3.8–10.5)
WBC # FLD AUTO: 7.44 K/UL — SIGNIFICANT CHANGE UP (ref 3.8–10.5)

## 2023-08-19 PROCEDURE — 99232 SBSQ HOSP IP/OBS MODERATE 35: CPT

## 2023-08-19 RX ADMIN — Medication 1 MILLIGRAM(S): at 11:31

## 2023-08-19 RX ADMIN — PANTOPRAZOLE SODIUM 40 MILLIGRAM(S): 20 TABLET, DELAYED RELEASE ORAL at 05:34

## 2023-08-19 RX ADMIN — APIXABAN 5 MILLIGRAM(S): 2.5 TABLET, FILM COATED ORAL at 05:33

## 2023-08-19 RX ADMIN — Medication 100 MILLIGRAM(S): at 11:32

## 2023-08-19 RX ADMIN — Medication 650 MILLIGRAM(S): at 14:15

## 2023-08-19 RX ADMIN — Medication 0.3 MILLIGRAM(S): at 11:32

## 2023-08-19 RX ADMIN — APIXABAN 5 MILLIGRAM(S): 2.5 TABLET, FILM COATED ORAL at 17:13

## 2023-08-19 RX ADMIN — Medication 650 MILLIGRAM(S): at 05:34

## 2023-08-19 RX ADMIN — PANTOPRAZOLE SODIUM 40 MILLIGRAM(S): 20 TABLET, DELAYED RELEASE ORAL at 17:12

## 2023-08-19 RX ADMIN — Medication 650 MILLIGRAM(S): at 21:15

## 2023-08-19 NOTE — PROGRESS NOTE ADULT - SUBJECTIVE AND OBJECTIVE BOX
[facility] NEPHROLOGY SERVICES, [facility]  NEPHROLOGY AND HYPERTENSION  [address]  [address]  [place] [address]  [phone]    MD ANN EMARIE HEWITT MD YELENA ROSENBERG, MD BINNY KOSHY, MD CHRISTOPHER CAPUTO, MD CATALINA KIRBY MD          Patient events noted    MEDICATIONS  (STANDING):  allopurinol 100 milliGRAM(s) Oral daily  apixaban 5 milliGRAM(s) Oral <User Schedule>  bisacodyl 20 milliGRAM(s) Oral once  budesonide 160 MICROgram(s)/formoterol 4.5 MICROgram(s) Inhaler 2 Puff(s) Inhalation two times a day  colchicine 0.3 milliGRAM(s) Oral daily  folic acid 1 milliGRAM(s) Oral daily  pantoprazole  Injectable 40 milliGRAM(s) IV Push every 12 hours  sodium bicarbonate 650 milliGRAM(s) Oral three times a day  tiotropium 2.5 MICROgram(s) Inhaler 2 Puff(s) Inhalation daily    MEDICATIONS  (PRN):  acetaminophen     Tablet .. 650 milliGRAM(s) Oral every 6 hours PRN Temp greater or equal to 38C (100.4F), Mild Pain (1 - 3)  albuterol    90 MICROgram(s) HFA Inhaler 2 Puff(s) Inhalation every 6 hours PRN Shortness of Breath and/or Wheezing  ondansetron Injectable 4 milliGRAM(s) IntraMuscular every 6 hours PRN Nausea and/or Vomiting      08-18-23 @ 07:01  -  08-19-23 @ 07:00  --------------------------------------------------------  IN: 900 mL / OUT: 1050 mL / NET: -150 mL    08-19-23 @ 07:01 - 08-19-23 @ 18:28  --------------------------------------------------------  IN: 760 mL / OUT: 1100 mL / NET: -340 mL      PHYSICAL EXAM:      T(C): 36.9 (08-19-23 @ 17:11), Max: 36.9 (08-19-23 @ 17:11)  HR: 61 (08-19-23 @ 17:11) (56 - 61)  BP: 110/71 (08-19-23 @ 17:11) (108/58 - 134/76)  RR: 18 (08-19-23 @ 17:11) (16 - 18)  SpO2: 100% (08-19-23 @ 17:11) (98% - 100%)  Wt(kg): --  Lungs clear  Heart S1S2  Abd soft NT ND  Extremities:   tr edema                                    8.0    7.44  )-----------( 224      ( 19 Aug 2023 08:50 )             26.6     08-19    140  |  111<H>  |  24<H>  ----------------------------<  83  3.7   |  23  |  2.63<H>    Ca    7.7<L>      19 Aug 2023 08:50          Creatinine Trend: 2.63<--, 2.62<--, 2.66<--, 2.80<--, 2.99<--, 3.01<--      Impression  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

## 2023-08-19 NOTE — PROGRESS NOTE ADULT - ASSESSMENT
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    UGIB w/ acute blood loss anemia  - CT abd showed few colonic diverticula without diverticulitis but no abnormal mass along the GI tract  - Hgb stable  - EGD on 8/16 showed no source of melena was identified  - colonoscopy on 8/17 showed internal hemorrhoids, hepatic flexure single angioectasia s/p APC, hepatic flexure 5mm polyp that removed with cold snare and mild pan-diverticulosis - w/ pathology showing tubular adenoma - GI recommended repeat colonoscopy in 7 years  - await - Transfuse to maintain Hgb > 7  - c/w folic acid  - c/w Protonix  - as per GI- severe metabolic acidosis needs to be resolved before patient can undergo EGD     VY on CKD IV  - resolved    Metabolic acidosis  - acidosis slowly improving with bicarb drip, and now with oral bicarb  - Nephrology following     Chronic Respiratory Failure with hypoxia due to COPD/ SONU on CPAP  - c/w CPAP & O2 via NC  - c/w Spiriva, Symbicort and Proair   - patient is stable/at baseline  - pulmonary following    Hx of LE DVT   - patient cleared to resume AC by GI  - LE dopplers did not show DVT in either extremity    Right knee joint effusion and small right popliteal fossa Baker's cyst measure   - US showed a large right knee joint effusion with apparent internal complexity and a small right popliteal fossa Baker's cyst   - likely from severe OA  - as per ortho, no intervention    Paroxysmal Atrial fibrillation   - Echo showed EF 55 - 60% w/ grade I diastolic dysfunction w/ severe concentric left ventricular hypertrophy, moderate aortic regurgitation with borderline pulmonary hypertension  - rate controlled  - patient cleared to resume AC by GI - Eliquis resumed    Gout  - c/w colchicine & allopurinol   - status post prednisone    IUD on CT  - patient is aware and will follow w/ his Gyn    Prophylaxis:  DVT: Eliquis  GI: Protonix    Pt medically stable and pending placement.

## 2023-08-19 NOTE — PROGRESS NOTE ADULT - SUBJECTIVE AND OBJECTIVE BOX
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 She is lying in bed in NAD.      MEDICATIONS  (STANDING):  allopurinol 100 milliGRAM(s) Oral daily  apixaban 5 milliGRAM(s) Oral <User Schedule>  bisacodyl 20 milliGRAM(s) Oral once  budesonide 160 MICROgram(s)/formoterol 4.5 MICROgram(s) Inhaler 2 Puff(s) Inhalation two times a day  colchicine 0.3 milliGRAM(s) Oral daily  folic acid 1 milliGRAM(s) Oral daily  pantoprazole  Injectable 40 milliGRAM(s) IV Push every 12 hours  sodium bicarbonate 650 milliGRAM(s) Oral three times a day  tiotropium 2.5 MICROgram(s) Inhaler 2 Puff(s) Inhalation daily    MEDICATIONS  (PRN):  acetaminophen     Tablet .. 650 milliGRAM(s) Oral every 6 hours PRN Temp greater or equal to 38C (100.4F), Mild Pain (1 - 3)  albuterol    90 MICROgram(s) HFA Inhaler 2 Puff(s) Inhalation every 6 hours PRN Shortness of Breath and/or Wheezing  ondansetron Injectable 4 milliGRAM(s) IntraMuscular every 6 hours PRN Nausea and/or Vomiting      Allergies    No Known Allergies    Intolerances      Vital Signs Last 24 Hrs  T(C): 36.9 (19 Aug 2023 17:11), Max: 36.9 (19 Aug 2023 17:11)  T(F): 98.4 (19 Aug 2023 17:11), Max: 98.4 (19 Aug 2023 17:11)  HR: 57 (19 Aug 2023 18:35) (56 - 61)  BP: 110/71 (19 Aug 2023 17:11) (108/58 - 134/76)   RR: 18 (19 Aug 2023 17:11) (16 - 18)  SpO2: 100% (19 Aug 2023 18:35) (98% - 100%)    Parameters below as of 19 Aug 2023 17:11  Patient On (Oxygen Delivery Method): nasal cannula  O2 Flow (L/min): 3      PHYSICAL EXAM:  GENERAL: NAD   HEAD:  Atraumatic, Normocephalic  EYES: EOMI, PERRLA   NECK: Supple   NERVOUS SYSTEM:  Alert & Oriented X3, Good concentration   CHEST/LUNG: Clear to auscultation bilaterally; No rales, rhonchi, wheezing, or rubs  HEART: Regular rate and rhythm; No murmurs, rubs, or gallops  ABDOMEN: Soft, Nontender, Nondistended; Bowel sounds present  EXTREMITIES: bilateral foot tenderness, R>L improved from yesterday      LABS:                        8.0    7.44  )-----------( 224      ( 19 Aug 2023 08:50 )             26.6     08-19    140  |  111<H>  |  24<H>  ----------------------------<  83  3.7   |  23  |  2.63<H>    Ca    7.7<L>      19 Aug 2023 08:50        Urinalysis Basic - ( 19 Aug 2023 08:50 )    Color: x / Appearance: x / SG: x / pH: x  Gluc: 83 mg/dL / Ketone: x  / Bili: x / Urobili: x   Blood: x / Protein: x / Nitrite: x   Leuk Esterase: x / RBC: x / WBC x   Sq Epi: x / Non Sq Epi: x / Bacteria: x       RADIOLOGY & ADDITIONAL TESTS:    08-18-23 @ 07:01  -  08-19-23 @ 07:00  --------------------------------------------------------  IN:    Oral Fluid: 900 mL  Total IN: 900 mL    OUT:    Voided (mL): 1050 mL  Total OUT: 1050 mL    Total NET: -150 mL      08-19-23 @ 07:01  -  08-19-23 @ 18:56  --------------------------------------------------------  IN:    Oral Fluid: 760 mL  Total IN: 760 mL    OUT:    Voided (mL): 1100 mL  Total OUT: 1100 mL    Total NET: -340 mL

## 2023-08-20 LAB
ALBUMIN SERPL ELPH-MCNC: 2.4 G/DL — LOW (ref 3.3–5)
ALP SERPL-CCNC: 274 U/L — HIGH (ref 40–120)
ALT FLD-CCNC: 52 U/L — SIGNIFICANT CHANGE UP (ref 12–78)
ANION GAP SERPL CALC-SCNC: 8 MMOL/L — SIGNIFICANT CHANGE UP (ref 5–17)
AST SERPL-CCNC: 32 U/L — SIGNIFICANT CHANGE UP (ref 15–37)
BILIRUB SERPL-MCNC: 0.3 MG/DL — SIGNIFICANT CHANGE UP (ref 0.2–1.2)
BUN SERPL-MCNC: 22 MG/DL — SIGNIFICANT CHANGE UP (ref 7–23)
CALCIUM SERPL-MCNC: 7.6 MG/DL — LOW (ref 8.5–10.1)
CHLORIDE SERPL-SCNC: 113 MMOL/L — HIGH (ref 96–108)
CO2 SERPL-SCNC: 23 MMOL/L — SIGNIFICANT CHANGE UP (ref 22–31)
CREAT SERPL-MCNC: 2.53 MG/DL — HIGH (ref 0.5–1.3)
EGFR: 20 ML/MIN/1.73M2 — LOW
GLUCOSE SERPL-MCNC: 69 MG/DL — LOW (ref 70–99)
MAGNESIUM SERPL-MCNC: 1.6 MG/DL — SIGNIFICANT CHANGE UP (ref 1.6–2.6)
PHOSPHATE SERPL-MCNC: 3.2 MG/DL — SIGNIFICANT CHANGE UP (ref 2.5–4.5)
POTASSIUM SERPL-MCNC: 4 MMOL/L — SIGNIFICANT CHANGE UP (ref 3.5–5.3)
POTASSIUM SERPL-SCNC: 4 MMOL/L — SIGNIFICANT CHANGE UP (ref 3.5–5.3)
PROT SERPL-MCNC: 6.3 GM/DL — SIGNIFICANT CHANGE UP (ref 6–8.3)
SODIUM SERPL-SCNC: 144 MMOL/L — SIGNIFICANT CHANGE UP (ref 135–145)

## 2023-08-20 PROCEDURE — 99232 SBSQ HOSP IP/OBS MODERATE 35: CPT

## 2023-08-20 RX ADMIN — PANTOPRAZOLE SODIUM 40 MILLIGRAM(S): 20 TABLET, DELAYED RELEASE ORAL at 05:18

## 2023-08-20 RX ADMIN — BUDESONIDE AND FORMOTEROL FUMARATE DIHYDRATE 2 PUFF(S): 160; 4.5 AEROSOL RESPIRATORY (INHALATION) at 05:20

## 2023-08-20 RX ADMIN — Medication 1 MILLIGRAM(S): at 12:01

## 2023-08-20 RX ADMIN — Medication 650 MILLIGRAM(S): at 05:19

## 2023-08-20 RX ADMIN — Medication 0.3 MILLIGRAM(S): at 12:01

## 2023-08-20 RX ADMIN — Medication 650 MILLIGRAM(S): at 22:10

## 2023-08-20 RX ADMIN — Medication 100 MILLIGRAM(S): at 12:01

## 2023-08-20 RX ADMIN — Medication 650 MILLIGRAM(S): at 14:56

## 2023-08-20 RX ADMIN — APIXABAN 5 MILLIGRAM(S): 2.5 TABLET, FILM COATED ORAL at 05:18

## 2023-08-20 RX ADMIN — APIXABAN 5 MILLIGRAM(S): 2.5 TABLET, FILM COATED ORAL at 17:24

## 2023-08-20 RX ADMIN — PANTOPRAZOLE SODIUM 40 MILLIGRAM(S): 20 TABLET, DELAYED RELEASE ORAL at 17:24

## 2023-08-20 NOTE — PROGRESS NOTE ADULT - SUBJECTIVE AND OBJECTIVE BOX
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 She is lying in bed in NAD.     MEDICATIONS  (STANDING):  allopurinol 100 milliGRAM(s) Oral daily  apixaban 5 milliGRAM(s) Oral <User Schedule>  bisacodyl 20 milliGRAM(s) Oral once  budesonide 160 MICROgram(s)/formoterol 4.5 MICROgram(s) Inhaler 2 Puff(s) Inhalation two times a day  colchicine 0.3 milliGRAM(s) Oral daily  folic acid 1 milliGRAM(s) Oral daily  pantoprazole  Injectable 40 milliGRAM(s) IV Push every 12 hours  sodium bicarbonate 650 milliGRAM(s) Oral three times a day  tiotropium 2.5 MICROgram(s) Inhaler 2 Puff(s) Inhalation daily    MEDICATIONS  (PRN):  acetaminophen     Tablet .. 650 milliGRAM(s) Oral every 6 hours PRN Temp greater or equal to 38C (100.4F), Mild Pain (1 - 3)  albuterol    90 MICROgram(s) HFA Inhaler 2 Puff(s) Inhalation every 6 hours PRN Shortness of Breath and/or Wheezing  ondansetron Injectable 4 milliGRAM(s) IntraMuscular every 6 hours PRN Nausea and/or Vomiting      Allergies    No Known Allergies    Intolerances        Vital Signs Last 24 Hrs  T(C): 37 (20 Aug 2023 17:15), Max: 37.2 (19 Aug 2023 23:10)  T(F): 98.6 (20 Aug 2023 17:15), Max: 98.9 (19 Aug 2023 23:10)  HR: 77 (20 Aug 2023 18:06) (55 - 78)  BP: 104/64 (20 Aug 2023 18:06) (83/57 - 121/66)  BP(mean): --  RR: 18 (20 Aug 2023 17:15) (18 - 18)  SpO2: 100% (20 Aug 2023 17:15) (100% - 100%)    Parameters below as of 20 Aug 2023 17:15  Patient On (Oxygen Delivery Method): nasal cannula        PHYSICAL EXAM:  GENERAL: NAD   HEAD:  Atraumatic, Normocephalic  EYES: EOMI, PERRLA   NECK: Supple   NERVOUS SYSTEM:  Alert & Oriented X3, Good concentration   CHEST/LUNG: Clear to auscultation bilaterally; No rales, rhonchi, wheezing, or rubs  HEART: Regular rate and rhythm; No murmurs, rubs, or gallops  ABDOMEN: Soft, Nontender, Nondistended; Bowel sounds present  EXTREMITIES: bilateral foot tenderness, R>L improved from yesterday    LABS:                        8.0    7.44  )-----------( 224      ( 19 Aug 2023 08:50 )             26.6     08-20    144  |  113<H>  |  22  ----------------------------<  69<L>  4.0   |  23  |  2.53<H>    Ca    7.6<L>      20 Aug 2023 05:30  Phos  3.2     08-20  Mg     1.6     08-20    TPro  6.3  /  Alb  2.4<L>  /  TBili  0.3  /  DBili  x   /  AST  32  /  ALT  52  /  AlkPhos  274<H>  08-20      Urinalysis Basic - ( 20 Aug 2023 05:30 )    Color: x / Appearance: x / SG: x / pH: x  Gluc: 69 mg/dL / Ketone: x  / Bili: x / Urobili: x   Blood: x / Protein: x / Nitrite: x   Leuk Esterase: x / RBC: x / WBC x   Sq Epi: x / Non Sq Epi: x / Bacteria: x         RADIOLOGY & ADDITIONAL TESTS:    08-19-23 @ 07:01  -  08-20-23 @ 07:00  --------------------------------------------------------  IN:    Oral Fluid: 1000 mL  Total IN: 1000 mL    OUT:    Voided (mL): 1500 mL  Total OUT: 1500 mL    Total NET: -500 mL      08-20-23 @ 07:01  -  08-20-23 @ 19:13  --------------------------------------------------------  IN:    Oral Fluid: 760 mL  Total IN: 760 mL    OUT:    Voided (mL): 800 mL  Total OUT: 800 mL    Total NET: -40 mL

## 2023-08-21 PROCEDURE — 99232 SBSQ HOSP IP/OBS MODERATE 35: CPT

## 2023-08-21 RX ADMIN — Medication 1 MILLIGRAM(S): at 11:19

## 2023-08-21 RX ADMIN — APIXABAN 5 MILLIGRAM(S): 2.5 TABLET, FILM COATED ORAL at 15:47

## 2023-08-21 RX ADMIN — Medication 650 MILLIGRAM(S): at 05:49

## 2023-08-21 RX ADMIN — Medication 0.3 MILLIGRAM(S): at 11:20

## 2023-08-21 RX ADMIN — PANTOPRAZOLE SODIUM 40 MILLIGRAM(S): 20 TABLET, DELAYED RELEASE ORAL at 05:48

## 2023-08-21 RX ADMIN — Medication 100 MILLIGRAM(S): at 11:19

## 2023-08-21 RX ADMIN — Medication 650 MILLIGRAM(S): at 13:54

## 2023-08-21 RX ADMIN — PANTOPRAZOLE SODIUM 40 MILLIGRAM(S): 20 TABLET, DELAYED RELEASE ORAL at 17:01

## 2023-08-21 RX ADMIN — Medication 650 MILLIGRAM(S): at 22:04

## 2023-08-21 RX ADMIN — APIXABAN 5 MILLIGRAM(S): 2.5 TABLET, FILM COATED ORAL at 05:48

## 2023-08-21 NOTE — PROGRESS NOTE ADULT - SUBJECTIVE AND OBJECTIVE BOX
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 She is lying in bed in NAD.    MEDICATIONS  (STANDING):  allopurinol 100 milliGRAM(s) Oral daily  apixaban 5 milliGRAM(s) Oral <User Schedule>  bisacodyl 20 milliGRAM(s) Oral once  budesonide 160 MICROgram(s)/formoterol 4.5 MICROgram(s) Inhaler 2 Puff(s) Inhalation two times a day  colchicine 0.3 milliGRAM(s) Oral daily  folic acid 1 milliGRAM(s) Oral daily  pantoprazole  Injectable 40 milliGRAM(s) IV Push every 12 hours  sodium bicarbonate 650 milliGRAM(s) Oral three times a day  tiotropium 2.5 MICROgram(s) Inhaler 2 Puff(s) Inhalation daily    MEDICATIONS  (PRN):  acetaminophen     Tablet .. 650 milliGRAM(s) Oral every 6 hours PRN Temp greater or equal to 38C (100.4F), Mild Pain (1 - 3)  albuterol    90 MICROgram(s) HFA Inhaler 2 Puff(s) Inhalation every 6 hours PRN Shortness of Breath and/or Wheezing  ondansetron Injectable 4 milliGRAM(s) IntraMuscular every 6 hours PRN Nausea and/or Vomiting      Allergies    No Known Allergies    Intolerances        Vital Signs Last 24 Hrs  T(C): 36.6 (21 Aug 2023 15:24), Max: 36.6 (21 Aug 2023 05:19)  T(F): 97.9 (21 Aug 2023 15:24), Max: 97.9 (21 Aug 2023 05:19)  HR: 66 (21 Aug 2023 15:24) (59 - 66)  BP: 120/61 (21 Aug 2023 15:24) (106/55 - 120/61)  BP(mean): --  RR: 18 (21 Aug 2023 15:24) (18 - 18)  SpO2: 98% (21 Aug 2023 15:24) (98% - 100%)    Parameters below as of 21 Aug 2023 15:24  Patient On (Oxygen Delivery Method): room air        PHYSICAL EXAM:  GENERAL: NAD   HEAD:  Atraumatic, Normocephalic  EYES: EOMI, PERRLA   NECK: Supple   NERVOUS SYSTEM:  Alert & Oriented X3, Good concentration   CHEST/LUNG: Clear to auscultation bilaterally; No rales, rhonchi, wheezing, or rubs  HEART: Regular rate and rhythm; No murmurs, rubs, or gallops  ABDOMEN: Soft, Nontender, Nondistended; Bowel sounds present  EXTREMITIES: bilateral foot tenderness, R>L improved from yesterday    LABS:    08-20    144  |  113<H>  |  22  ----------------------------<  69<L>  4.0   |  23  |  2.53<H>    Ca    7.6<L>      20 Aug 2023 05:30  Phos  3.2     08-20  Mg     1.6     08-20    TPro  6.3  /  Alb  2.4<L>  /  TBili  0.3  /  DBili  x   /  AST  32  /  ALT  52  /  AlkPhos  274<H>  08-20      Urinalysis Basic - ( 20 Aug 2023 05:30 )    Color: x / Appearance: x / SG: x / pH: x  Gluc: 69 mg/dL / Ketone: x  / Bili: x / Urobili: x   Blood: x / Protein: x / Nitrite: x   Leuk Esterase: x / RBC: x / WBC x   Sq Epi: x / Non Sq Epi: x / Bacteria: x    RADIOLOGY & ADDITIONAL TESTS:    08-20-23 @ 07:01  -  08-21-23 @ 07:00  --------------------------------------------------------  IN:    Oral Fluid: 880 mL  Total IN: 880 mL    OUT:    Voided (mL): 1600 mL  Total OUT: 1600 mL    Total NET: -720 mL      08-21-23 @ 07:01  -  08-21-23 @ 19:38  --------------------------------------------------------  IN:    Oral Fluid: 320 mL  Total IN: 320 mL    OUT:    Voided (mL): 1050 mL  Total OUT: 1050 mL    Total NET: -730 mL

## 2023-08-21 NOTE — PROGRESS NOTE ADULT - SUBJECTIVE AND OBJECTIVE BOX
[facility] NEPHROLOGY SERVICES, [facility]  NEPHROLOGY AND HYPERTENSION  [address]  [address]  [place] [address]  [phone]    MD ANNE MARIE HEWITT MD YELENA ROSENBERG, MD BINNY KOSHY, MD CHRISTOPHER CAPUTO, MD EDWARD BOVER, MD          Patient events noted  No distress  feels well no sob      MEDICATIONS  (STANDING):  allopurinol 100 milliGRAM(s) Oral daily  apixaban 5 milliGRAM(s) Oral <User Schedule>  bisacodyl 20 milliGRAM(s) Oral once  budesonide 160 MICROgram(s)/formoterol 4.5 MICROgram(s) Inhaler 2 Puff(s) Inhalation two times a day  colchicine 0.3 milliGRAM(s) Oral daily  folic acid 1 milliGRAM(s) Oral daily  pantoprazole  Injectable 40 milliGRAM(s) IV Push every 12 hours  sodium bicarbonate 650 milliGRAM(s) Oral three times a day  tiotropium 2.5 MICROgram(s) Inhaler 2 Puff(s) Inhalation daily    MEDICATIONS  (PRN):  acetaminophen     Tablet .. 650 milliGRAM(s) Oral every 6 hours PRN Temp greater or equal to 38C (100.4F), Mild Pain (1 - 3)  albuterol    90 MICROgram(s) HFA Inhaler 2 Puff(s) Inhalation every 6 hours PRN Shortness of Breath and/or Wheezing  ondansetron Injectable 4 milliGRAM(s) IntraMuscular every 6 hours PRN Nausea and/or Vomiting      08-20-23 @ 07:01  -  08-21-23 @ 07:00  --------------------------------------------------------  IN: 880 mL / OUT: 1600 mL / NET: -720 mL      PHYSICAL EXAM:      T(C): 36.6 (08-21-23 @ 10:11), Max: 37 (08-20-23 @ 17:15)  HR: 60 (08-21-23 @ 10:11) (59 - 78)  BP: 119/56 (08-21-23 @ 10:11) (83/57 - 119/56)  RR: 18 (08-21-23 @ 10:11) (18 - 18)  SpO2: 100% (08-21-23 @ 10:11) (100% - 100%)  Wt(kg): --  Lungs clear  Heart S1S2  Abd soft NT ND  Extremities:   tr edema                08-20    144  |  113<H>  |  22  ----------------------------<  69<L>  4.0   |  23  |  2.53<H>    Ca    7.6<L>      20 Aug 2023 05:30  Phos  3.2     08-20  Mg     1.6     08-20    TPro  6.3  /  Alb  2.4<L> corrected 8.8 corrected /  TBili  0.3  /  DBili  x   /  AST  32  /  ALT  52  /  AlkPhos  274<H>  08-20      LIVER FUNCTIONS - ( 20 Aug 2023 05:30 )  Alb: 2.4 g/dL / Pro: 6.3 gm/dL / ALK PHOS: 274 U/L / ALT: 52 U/L / AST: 32 U/L / GGT: x           Creatinine Trend: 2.53<--, 2.63<--, 2.62<--, 2.66<--, 2.80<--, 2.99<--      Impression  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

## 2023-08-22 LAB
ANION GAP SERPL CALC-SCNC: 5 MMOL/L — SIGNIFICANT CHANGE UP (ref 5–17)
BUN SERPL-MCNC: 20 MG/DL — SIGNIFICANT CHANGE UP (ref 7–23)
CALCIUM SERPL-MCNC: 7.8 MG/DL — LOW (ref 8.5–10.1)
CHLORIDE SERPL-SCNC: 115 MMOL/L — HIGH (ref 96–108)
CO2 SERPL-SCNC: 22 MMOL/L — SIGNIFICANT CHANGE UP (ref 22–31)
CREAT SERPL-MCNC: 2.46 MG/DL — HIGH (ref 0.5–1.3)
EGFR: 21 ML/MIN/1.73M2 — LOW
GLUCOSE SERPL-MCNC: 86 MG/DL — SIGNIFICANT CHANGE UP (ref 70–99)
MAGNESIUM SERPL-MCNC: 1.6 MG/DL — SIGNIFICANT CHANGE UP (ref 1.6–2.6)
PHOSPHATE SERPL-MCNC: 3.1 MG/DL — SIGNIFICANT CHANGE UP (ref 2.5–4.5)
POTASSIUM SERPL-MCNC: 3.9 MMOL/L — SIGNIFICANT CHANGE UP (ref 3.5–5.3)
POTASSIUM SERPL-SCNC: 3.9 MMOL/L — SIGNIFICANT CHANGE UP (ref 3.5–5.3)
SODIUM SERPL-SCNC: 142 MMOL/L — SIGNIFICANT CHANGE UP (ref 135–145)

## 2023-08-22 PROCEDURE — 99232 SBSQ HOSP IP/OBS MODERATE 35: CPT

## 2023-08-22 RX ORDER — PANTOPRAZOLE SODIUM 20 MG/1
40 TABLET, DELAYED RELEASE ORAL
Refills: 0 | Status: DISCONTINUED | OUTPATIENT
Start: 2023-08-22 | End: 2023-08-28

## 2023-08-22 RX ADMIN — Medication 650 MILLIGRAM(S): at 21:08

## 2023-08-22 RX ADMIN — Medication 100 MILLIGRAM(S): at 12:29

## 2023-08-22 RX ADMIN — Medication 650 MILLIGRAM(S): at 13:54

## 2023-08-22 RX ADMIN — Medication 0.3 MILLIGRAM(S): at 12:29

## 2023-08-22 RX ADMIN — APIXABAN 5 MILLIGRAM(S): 2.5 TABLET, FILM COATED ORAL at 05:28

## 2023-08-22 RX ADMIN — APIXABAN 5 MILLIGRAM(S): 2.5 TABLET, FILM COATED ORAL at 17:32

## 2023-08-22 RX ADMIN — Medication 650 MILLIGRAM(S): at 05:28

## 2023-08-22 RX ADMIN — PANTOPRAZOLE SODIUM 40 MILLIGRAM(S): 20 TABLET, DELAYED RELEASE ORAL at 17:33

## 2023-08-22 RX ADMIN — Medication 1 MILLIGRAM(S): at 12:29

## 2023-08-22 NOTE — OCCUPATIONAL THERAPY INITIAL EVALUATION ADULT - GENERAL OBSERVATIONS, REHAB EVAL
Pt was encountered supine in bed; NAD, O2 supplementation via nasal cannula, cardiac monitor +, alert, followed commands, cooperative; pt had no c/o pain. PTA Gerardo present.

## 2023-08-22 NOTE — OCCUPATIONAL THERAPY INITIAL EVALUATION ADULT - ADDITIONAL COMMENTS
Pt lives alone in an apartment with a ramp access. Once inside, the pt has an elevator that takes the pt to the main floor where the apartment is. The ambulates with a cane/RW inside of the home and a wheelchair in the community.

## 2023-08-22 NOTE — PROGRESS NOTE ADULT - ASSESSMENT
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    UGIB w/ acute blood loss anemia  - CT abd showed few colonic diverticula without diverticulitis but no abnormal mass along the GI tract  - Hgb stable  - EGD on 8/16 showed no source of melena was identified  - colonoscopy on 8/17 showed internal hemorrhoids, hepatic flexure single angioectasia s/p APC, hepatic flexure 5mm polyp that removed with cold snare and mild pan-diverticulosis - w/ pathology showing tubular adenoma - GI recommended repeat colonoscopy in 7 years  - await - Transfuse to maintain Hgb > 7  - c/w folic acid     VY on CKD IV  - resolved    Metabolic acidosis  - acidosis slowly improving with bicarb drip, and now with oral bicarb  - Nephrology following     Chronic Respiratory Failure with hypoxia due to COPD/ SONU on CPAP  - c/w CPAP & O2 via NC  - c/w Spiriva, Symbicort and Proair   - patient is stable/at baseline  - pulmonary following    Hx of LE DVT   - patient cleared to resume AC by GI  - LE dopplers did not show DVT in either extremity    Right knee joint effusion and small right popliteal fossa Baker's cyst measure   - US showed a large right knee joint effusion with apparent internal complexity and a small right popliteal fossa Baker's cyst   - likely from severe OA  - as per ortho, no intervention    Paroxysmal Atrial fibrillation   - Echo showed EF 55 - 60% w/ grade I diastolic dysfunction w/ severe concentric left ventricular hypertrophy, moderate aortic regurgitation with borderline pulmonary hypertension  - rate controlled  - patient cleared to resume AC by GI and Eliquis now resumed    Gout  - c/w colchicine & allopurinol   - status post prednisone    IUD on CT  - patient is aware and will follow w/ his Gyn    Prophylaxis:  DVT: Eliquis  GI: PO diet    Pt medically stable and pending placement. Peer to peer done today w/ Dr. Olmos and further PT/OT notes were faxed to the insurance company by [employer]

## 2023-08-22 NOTE — PROGRESS NOTE ADULT - SUBJECTIVE AND OBJECTIVE BOX
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 She is lying in bed in NAD.      MEDICATIONS  (STANDING):  allopurinol 100 milliGRAM(s) Oral daily  apixaban 5 milliGRAM(s) Oral <User Schedule>  bisacodyl 20 milliGRAM(s) Oral once  budesonide 160 MICROgram(s)/formoterol 4.5 MICROgram(s) Inhaler 2 Puff(s) Inhalation two times a day  colchicine 0.3 milliGRAM(s) Oral daily  folic acid 1 milliGRAM(s) Oral daily  pantoprazole    Tablet 40 milliGRAM(s) Oral before breakfast  sodium bicarbonate 650 milliGRAM(s) Oral three times a day  tiotropium 2.5 MICROgram(s) Inhaler 2 Puff(s) Inhalation daily    MEDICATIONS  (PRN):  acetaminophen     Tablet .. 650 milliGRAM(s) Oral every 6 hours PRN Temp greater or equal to 38C (100.4F), Mild Pain (1 - 3)  albuterol    90 MICROgram(s) HFA Inhaler 2 Puff(s) Inhalation every 6 hours PRN Shortness of Breath and/or Wheezing  ondansetron Injectable 4 milliGRAM(s) IntraMuscular every 6 hours PRN Nausea and/or Vomiting      Allergies    No Known Allergies    Intolerances        Vital Signs Last 24 Hrs  T(C): 36.2 (22 Aug 2023 15:30), Max: 37 (21 Aug 2023 23:23)  T(F): 97.1 (22 Aug 2023 15:30), Max: 98.6 (21 Aug 2023 23:23)  HR: 84 (22 Aug 2023 15:30) (56 - 95)  BP: 122/66 (22 Aug 2023 15:30) (97/58 - 145/76)   RR: 18 (22 Aug 2023 15:30) (18 - 18)  SpO2: 100% (22 Aug 2023 15:30) (100% - 100%)    Parameters below as of 22 Aug 2023 15:30  Patient On (Oxygen Delivery Method): nasal cannula  O2 Flow (L/min): 2      PHYSICAL EXAM:  GENERAL: NAD   HEAD:  Atraumatic, Normocephalic  EYES: EOMI, PERRLA   NECK: Supple   NERVOUS SYSTEM:  Alert & Oriented X3, Good concentration   CHEST/LUNG: Clear to auscultation bilaterally; No rales, rhonchi, wheezing, or rubs  HEART: Regular rate and rhythm; No murmurs, rubs, or gallops  ABDOMEN: Soft, Nontender, Nondistended; Bowel sounds present  EXTREMITIES: bilateral foot tenderness, R>L improved from yesterday    LABS:    08-22    142  |  115<H>  |  20  ----------------------------<  86  3.9   |  22  |  2.46<H>    Ca    7.8<L>      22 Aug 2023 06:39  Phos  3.1     08-22  Mg     1.6     08-22        Urinalysis Basic - ( 22 Aug 2023 06:39 )    Color: x / Appearance: x / SG: x / pH: x  Gluc: 86 mg/dL / Ketone: x  / Bili: x / Urobili: x   Blood: x / Protein: x / Nitrite: x   Leuk Esterase: x / RBC: x / WBC x   Sq Epi: x / Non Sq Epi: x / Bacteria: x       RADIOLOGY & ADDITIONAL TESTS:    08-21-23 @ 07:01  -  08-22-23 @ 07:00  --------------------------------------------------------  IN:    Oral Fluid: 320 mL  Total IN: 320 mL    OUT:    Voided (mL): 1350 mL  Total OUT: 1350 mL    Total NET: -1030 mL      08-22-23 @ 07:01  -  08-22-23 @ 20:15  --------------------------------------------------------  IN:    Oral Fluid: 560 mL  Total IN: 560 mL    OUT:  Total OUT: 0 mL    Total NET: 560 mL

## 2023-08-22 NOTE — OCCUPATIONAL THERAPY INITIAL EVALUATION ADULT - PERTINENT HX OF CURRENT PROBLEM, REHAB EVAL
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 She is lying in bed in NAD.

## 2023-08-23 LAB
ANION GAP SERPL CALC-SCNC: 5 MMOL/L — SIGNIFICANT CHANGE UP (ref 5–17)
BUN SERPL-MCNC: 20 MG/DL — SIGNIFICANT CHANGE UP (ref 7–23)
CALCIUM SERPL-MCNC: 7.6 MG/DL — LOW (ref 8.5–10.1)
CHLORIDE SERPL-SCNC: 113 MMOL/L — HIGH (ref 96–108)
CO2 SERPL-SCNC: 24 MMOL/L — SIGNIFICANT CHANGE UP (ref 22–31)
CREAT SERPL-MCNC: 2.43 MG/DL — HIGH (ref 0.5–1.3)
EGFR: 21 ML/MIN/1.73M2 — LOW
GLUCOSE SERPL-MCNC: 82 MG/DL — SIGNIFICANT CHANGE UP (ref 70–99)
HCT VFR BLD CALC: 26.1 % — LOW (ref 34.5–45)
HGB BLD-MCNC: 7.8 G/DL — LOW (ref 11.5–15.5)
MAGNESIUM SERPL-MCNC: 1.5 MG/DL — LOW (ref 1.6–2.6)
MCHC RBC-ENTMCNC: 29.9 G/DL — LOW (ref 32–36)
MCHC RBC-ENTMCNC: 32.1 PG — SIGNIFICANT CHANGE UP (ref 27–34)
MCV RBC AUTO: 107.4 FL — HIGH (ref 80–100)
NRBC # BLD: 0 /100 WBCS — SIGNIFICANT CHANGE UP (ref 0–0)
PHOSPHATE SERPL-MCNC: 3.6 MG/DL — SIGNIFICANT CHANGE UP (ref 2.5–4.5)
PLATELET # BLD AUTO: 297 K/UL — SIGNIFICANT CHANGE UP (ref 150–400)
POTASSIUM SERPL-MCNC: 3.8 MMOL/L — SIGNIFICANT CHANGE UP (ref 3.5–5.3)
POTASSIUM SERPL-SCNC: 3.8 MMOL/L — SIGNIFICANT CHANGE UP (ref 3.5–5.3)
RBC # BLD: 2.43 M/UL — LOW (ref 3.8–5.2)
RBC # FLD: 15.6 % — HIGH (ref 10.3–14.5)
SODIUM SERPL-SCNC: 142 MMOL/L — SIGNIFICANT CHANGE UP (ref 135–145)
WBC # BLD: 6.14 K/UL — SIGNIFICANT CHANGE UP (ref 3.8–10.5)
WBC # FLD AUTO: 6.14 K/UL — SIGNIFICANT CHANGE UP (ref 3.8–10.5)

## 2023-08-23 PROCEDURE — 99232 SBSQ HOSP IP/OBS MODERATE 35: CPT

## 2023-08-23 RX ORDER — MAGNESIUM OXIDE 400 MG ORAL TABLET 241.3 MG
400 TABLET ORAL
Refills: 0 | Status: COMPLETED | OUTPATIENT
Start: 2023-08-23 | End: 2023-08-24

## 2023-08-23 RX ORDER — MAGNESIUM SULFATE 500 MG/ML
2 VIAL (ML) INJECTION ONCE
Refills: 0 | Status: DISCONTINUED | OUTPATIENT
Start: 2023-08-23 | End: 2023-08-23

## 2023-08-23 RX ADMIN — Medication 0.3 MILLIGRAM(S): at 11:27

## 2023-08-23 RX ADMIN — Medication 100 MILLIGRAM(S): at 11:26

## 2023-08-23 RX ADMIN — Medication 650 MILLIGRAM(S): at 13:40

## 2023-08-23 RX ADMIN — Medication 1 MILLIGRAM(S): at 11:27

## 2023-08-23 RX ADMIN — PANTOPRAZOLE SODIUM 40 MILLIGRAM(S): 20 TABLET, DELAYED RELEASE ORAL at 05:00

## 2023-08-23 RX ADMIN — APIXABAN 5 MILLIGRAM(S): 2.5 TABLET, FILM COATED ORAL at 17:23

## 2023-08-23 RX ADMIN — Medication 650 MILLIGRAM(S): at 05:00

## 2023-08-23 RX ADMIN — APIXABAN 5 MILLIGRAM(S): 2.5 TABLET, FILM COATED ORAL at 04:51

## 2023-08-23 NOTE — PROGRESS NOTE ADULT - SUBJECTIVE AND OBJECTIVE BOX
[facility] NEPHROLOGY SERVICES, [facility]  NEPHROLOGY AND HYPERTENSION  [address]  [address]  [place] [address]  [phone]    MD ANEN MARIE HEWITT MD YELENA ROSENBERG, MD BINNY KOSHY, MD CHRISTOPHER CAPUTO, MD EDWARD BOVER, MD          Patient events noted    MEDICATIONS  (STANDING):  allopurinol 100 milliGRAM(s) Oral daily  apixaban 5 milliGRAM(s) Oral <User Schedule>  bisacodyl 20 milliGRAM(s) Oral once  budesonide 160 MICROgram(s)/formoterol 4.5 MICROgram(s) Inhaler 2 Puff(s) Inhalation two times a day  colchicine 0.3 milliGRAM(s) Oral daily  folic acid 1 milliGRAM(s) Oral daily  pantoprazole    Tablet 40 milliGRAM(s) Oral before breakfast  sodium bicarbonate 650 milliGRAM(s) Oral three times a day  tiotropium 2.5 MICROgram(s) Inhaler 2 Puff(s) Inhalation daily    MEDICATIONS  (PRN):  acetaminophen     Tablet .. 650 milliGRAM(s) Oral every 6 hours PRN Temp greater or equal to 38C (100.4F), Mild Pain (1 - 3)  albuterol    90 MICROgram(s) HFA Inhaler 2 Puff(s) Inhalation every 6 hours PRN Shortness of Breath and/or Wheezing  ondansetron Injectable 4 milliGRAM(s) IntraMuscular every 6 hours PRN Nausea and/or Vomiting      08-22-23 @ 07:01  -  08-23-23 @ 07:00  --------------------------------------------------------  IN: 560 mL / OUT: 700 mL / NET: -140 mL      PHYSICAL EXAM:      T(C): 36.6 (08-23-23 @ 15:36), Max: 37.1 (08-22-23 @ 23:21)  HR: 73 (08-23-23 @ 15:36) (58 - 73)  BP: 117/76 (08-23-23 @ 15:36) (110/56 - 117/76)  RR: 18 (08-23-23 @ 15:36) (18 - 18)  SpO2: 100% (08-23-23 @ 15:36) (100% - 100%)  Wt(kg): --  Lungs clear  Heart S1S2  Abd soft NT ND  Extremities:   tr edema                                    7.8    6.14  )-----------( 297      ( 23 Aug 2023 06:10 )             26.1     08-23    142  |  113<H>  |  20  ----------------------------<  82  3.8   |  24  |  2.43<H>    Ca    7.6<L>      23 Aug 2023 06:10  Phos  3.6     08-23  Mg     1.5     08-23          Creatinine Trend: 2.43<--, 2.46<--, 2.53<--, 2.63<--, 2.62<--, 2.66<--      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

## 2023-08-23 NOTE — PROGRESS NOTE ADULT - SUBJECTIVE AND OBJECTIVE BOX
Patient seen and examined  no acute overnight events   patient feels well  P-P decision pending    Review of Systems:  General:denies fever chills, headache, weakness  HEENT: denies blurry vision,diffculty swallowing, difficulty hearing, tinnitus  Cardiovascular: denies chest pain  ,palpitations  Pulmonary:denies shortness of breath, cough, wheezing, hemoptysis  Gastrointestinal: denies abdominal pain, constipation, diarrhea,nausea , vomiting, hematochezia  : denies hematuria, dysuria, or incontinence  Neurological: denies weakness, numbness , tingling, dizziness, tremors  MSK: denies muscle pain, difficulty ambulating, swelling, back pain  skin: denies skin rash, itching, burning, or  skin lesions  Psychiatrical: denies mood disturbances, anxierty, feeling depressed, depression , or difficulty sleeping    Objective:  Vitals  T(C): 36.4 (08-23-23 @ 11:08), Max: 37.1 (08-22-23 @ 23:21)  HR: 69 (08-23-23 @ 11:08) (58 - 95)  BP: 113/70 (08-23-23 @ 11:08) (110/56 - 145/76)  RR: 18 (08-23-23 @ 11:08) (18 - 18)  SpO2: 100% (08-23-23 @ 11:08) (100% - 100%)    Physical Exam:  General: comfortable, no acute distress  HEENT: Atraumatic, no LAD, trachea midline, PERRLA  Cardiovascular: normal s1s2, no murmurs, gallops or fricition rubs  Pulmonary: clear to ausculation Bilaterally, no wheezing , rhonchi  Gastrointestinal: soft non tender non distended, no masses felt, no organomegally  Muscloskeletal: no lower extremity edema, intact bilateral lower extremity pulses  Neurological: CN II-12 intact. No focal weakness  Psychiatrical: normal mood, cooperative  SKIN: no rash, lesions or ulcers    Labs:                          7.8    6.14  )-----------( 297      ( 23 Aug 2023 06:10 )             26.1     08-23    142  |  113<H>  |  20  ----------------------------<  82  3.8   |  24  |  2.43<H>    Ca    7.6<L>      23 Aug 2023 06:10  Phos  3.6     08-23  Mg     1.5     08-23              Active Medications  MEDICATIONS  (STANDING):  allopurinol 100 milliGRAM(s) Oral daily  apixaban 5 milliGRAM(s) Oral <User Schedule>  bisacodyl 20 milliGRAM(s) Oral once  budesonide 160 MICROgram(s)/formoterol 4.5 MICROgram(s) Inhaler 2 Puff(s) Inhalation two times a day  colchicine 0.3 milliGRAM(s) Oral daily  folic acid 1 milliGRAM(s) Oral daily  pantoprazole    Tablet 40 milliGRAM(s) Oral before breakfast  sodium bicarbonate 650 milliGRAM(s) Oral three times a day  tiotropium 2.5 MICROgram(s) Inhaler 2 Puff(s) Inhalation daily    MEDICATIONS  (PRN):  acetaminophen     Tablet .. 650 milliGRAM(s) Oral every 6 hours PRN Temp greater or equal to 38C (100.4F), Mild Pain (1 - 3)  albuterol    90 MICROgram(s) HFA Inhaler 2 Puff(s) Inhalation every 6 hours PRN Shortness of Breath and/or Wheezing  ondansetron Injectable 4 milliGRAM(s) IntraMuscular every 6 hours PRN Nausea and/or Vomiting

## 2023-08-23 NOTE — CHART NOTE - NSCHARTNOTEFT_GEN_A_CORE
Pt admitted c dark stools for 3-4 days PTA; suspected UGIB. Abdominal CT showed a few colonic diverticula w/o diverticulitis; EGD (8/16) no source of melena identified; colonoscopy (8/17) showed internal hemorrhoids, polyp (removed). Pt is medically stable for d/c; has been denied by insurance co for [facility] placement; MD to do [id number] interview to obtain insurance authorization. PMHx includes HTN, Gout, hypothyroid, AFib, COPD on o2 support at home, SONU on SPAP, AOCD    Factors impacting intake: [X ] none [ ] nausea  [ ] vomiting [ ] diarrhea [ ] constipation  [ ]chewing problems [ ] swallowing issues  [ ] other:     Diet Prescription: Diet, Regular:   Low Sodium (08-17-23 @ 15:17)    Intake:   Pt eating 50 - 100% most meals    Current Weight: Weight (kg): 111.6 kg (8/22); admission wt 107 kg (8/9)  % Weight Change:  4.1% wt gain x 13 days    no edema noted    Pertinent Medications: MEDICATIONS  (STANDING):  allopurinol 100 milliGRAM(s) Oral daily  apixaban 5 milliGRAM(s) Oral <User Schedule>  bisacodyl 20 milliGRAM(s) Oral once  budesonide 160 MICROgram(s)/formoterol 4.5 MICROgram(s) Inhaler 2 Puff(s) Inhalation two times a day  colchicine 0.3 milliGRAM(s) Oral daily  folic acid 1 milliGRAM(s) Oral daily  pantoprazole    Tablet 40 milliGRAM(s) Oral before breakfast  sodium bicarbonate 650 milliGRAM(s) Oral three times a day  tiotropium 2.5 MICROgram(s) Inhaler 2 Puff(s) Inhalation daily    MEDICATIONS  (PRN):  acetaminophen     Tablet .. 650 milliGRAM(s) Oral every 6 hours PRN Temp greater or equal to 38C (100.4F), Mild Pain (1 - 3)  albuterol    90 MICROgram(s) HFA Inhaler 2 Puff(s) Inhalation every 6 hours PRN Shortness of Breath and/or Wheezing  ondansetron Injectable 4 milliGRAM(s) IntraMuscular every 6 hours PRN Nausea and/or Vomiting    Pertinent Labs: 08-22 Na142 mmol/L Glu 86 mg/dL K+ 3.9 mmol/L Cr  2.46 mg/dL<H> BUN 20 mg/dL 08-22 Phos 3.1 mg/dL 08-20 Alb 2.4 g/dL<L>    Skin:   WDL    Estimated Needs:   [ X] no change since previous assessment (8/15)  [ ] recalculated:     Previous Nutrition Diagnosis:   [ X] Inadequate Energy Intake   Etiology:  Decreased ability to consume sufficient energy  Signs & Symptoms:  NPO/Clears x 4 days during admission; GI bleed    Nutrition Diagnosis is [ ] ongoing  [X ] resolved [ ] not applicable     New Nutrition Diagnosis: [X ] not applicable - NONE      Interventions:   continue current diet rx as noted  Recommend  [ ] Change Diet To:  [ ] Nutrition Supplement  [ ] Nutrition Support  [ ] Other:     Monitoring and Evaluation:   [X ] PO intake [ x ] Tolerance to diet prescription [ x ] weights [ x ] labs[ x ] follow up per protocol  [ ] other:
8/15/23  To whom it may concern:    Ms. Porsha Henry was admitted on 8/9/2023. For medically necessary reasons, she was hospitalized at [facility] and is still currently admitted.    If there are any questions please call at [phone][place] [address]    Thank you very much.
Chart reviewed and noted events to date. Patient had been in PT program since Aug 10. Last seen by PT 8/22 (yesterday) and day before. New PT consult  ordered today. Completed duplicate order. Patient remains to need Sub-Acute Rehab, per PT recommendation.
Patient has a severe mobility limitation and requires a standard wheelchair to assist in daily ADL's. Patient can not ambulate safely with a cane or a walker. Patient has sufficient upper body strength to self propel said wheelchair and has not expressed an unwillingness to use the wheelchair. Patient has ample room in the home and a caregiver to assist with the wheelchair. Use of a manual wheelchair will significantly improve the patient's ability to participate in daily ADL's and the patient will use it on a regular basis in the home.

## 2023-08-24 PROCEDURE — 99232 SBSQ HOSP IP/OBS MODERATE 35: CPT

## 2023-08-24 RX ADMIN — Medication 0.3 MILLIGRAM(S): at 11:34

## 2023-08-24 RX ADMIN — MAGNESIUM OXIDE 400 MG ORAL TABLET 400 MILLIGRAM(S): 241.3 TABLET ORAL at 00:09

## 2023-08-24 RX ADMIN — APIXABAN 5 MILLIGRAM(S): 2.5 TABLET, FILM COATED ORAL at 05:25

## 2023-08-24 RX ADMIN — Medication 650 MILLIGRAM(S): at 05:24

## 2023-08-24 RX ADMIN — Medication 100 MILLIGRAM(S): at 11:36

## 2023-08-24 RX ADMIN — APIXABAN 5 MILLIGRAM(S): 2.5 TABLET, FILM COATED ORAL at 15:49

## 2023-08-24 RX ADMIN — Medication 650 MILLIGRAM(S): at 15:48

## 2023-08-24 RX ADMIN — Medication 650 MILLIGRAM(S): at 21:00

## 2023-08-24 RX ADMIN — Medication 1 MILLIGRAM(S): at 11:36

## 2023-08-24 RX ADMIN — PANTOPRAZOLE SODIUM 40 MILLIGRAM(S): 20 TABLET, DELAYED RELEASE ORAL at 05:24

## 2023-08-24 RX ADMIN — MAGNESIUM OXIDE 400 MG ORAL TABLET 400 MILLIGRAM(S): 241.3 TABLET ORAL at 10:19

## 2023-08-24 NOTE — PROGRESS NOTE ADULT - SUBJECTIVE AND OBJECTIVE BOX
patient seen and examined  frustrated upset wants update on insurance appeal  SW on case  Review of Systems:  General:denies fever chills, headache, weakness  HEENT: denies blurry vision,diffculty swallowing, difficulty hearing, tinnitus  Cardiovascular: denies chest pain  ,palpitations  Pulmonary:denies shortness of breath, cough, wheezing, hemoptysis  Gastrointestinal: denies abdominal pain, constipation, diarrhea,nausea , vomiting, hematochezia  : denies hematuria, dysuria, or incontinence  Neurological: denies weakness, numbness , tingling, dizziness, tremors  MSK: denies muscle pain, difficulty ambulating, swelling, back pain  skin: denies skin rash, itching, burning, or  skin lesions  Psychiatrical: denies mood disturbances, anxierty, feeling depressed, depression , or difficulty sleeping    Objective:  Vitals  T(C): 36.6 (08-24-23 @ 15:49), Max: 37 (08-24-23 @ 00:07)  HR: 64 (08-24-23 @ 15:49) (63 - 78)  BP: 101/64 (08-24-23 @ 15:49) (100/65 - 110/54)  RR: 18 (08-24-23 @ 15:49) (18 - 19)  SpO2: 100% (08-24-23 @ 15:49) (100% - 100%)    Physical Exam:  General: comfortable, no acute distress  HEENT: Atraumatic, no LAD, trachea midline, PERRLA  Cardiovascular: normal s1s2, no murmurs, gallops or fricition rubs  Pulmonary: clear to ausculation Bilaterally, no wheezing , rhonchi  Gastrointestinal: soft non tender non distended, no masses felt, no organomegally  Muscloskeletal: no lower extremity edema, intact bilateral lower extremity pulses  Neurological: CN II-12 intact. No focal weakness  Psychiatrical: normal mood, cooperative  SKIN: no rash, lesions or ulcers    Labs:                          7.8    6.14  )-----------( 297      ( 23 Aug 2023 06:10 )             26.1     08-23    142  |  113<H>  |  20  ----------------------------<  82  3.8   |  24  |  2.43<H>    Ca    7.6<L>      23 Aug 2023 06:10  Phos  3.6     08-23  Mg     1.5     08-23              Active Medications  MEDICATIONS  (STANDING):  allopurinol 100 milliGRAM(s) Oral daily  apixaban 5 milliGRAM(s) Oral <User Schedule>  bisacodyl 20 milliGRAM(s) Oral once  budesonide 160 MICROgram(s)/formoterol 4.5 MICROgram(s) Inhaler 2 Puff(s) Inhalation two times a day  colchicine 0.3 milliGRAM(s) Oral daily  folic acid 1 milliGRAM(s) Oral daily  pantoprazole    Tablet 40 milliGRAM(s) Oral before breakfast  sodium bicarbonate 650 milliGRAM(s) Oral three times a day  tiotropium 2.5 MICROgram(s) Inhaler 2 Puff(s) Inhalation daily    MEDICATIONS  (PRN):  acetaminophen     Tablet .. 650 milliGRAM(s) Oral every 6 hours PRN Temp greater or equal to 38C (100.4F), Mild Pain (1 - 3)  albuterol    90 MICROgram(s) HFA Inhaler 2 Puff(s) Inhalation every 6 hours PRN Shortness of Breath and/or Wheezing  ondansetron Injectable 4 milliGRAM(s) IntraMuscular every 6 hours PRN Nausea and/or Vomiting

## 2023-08-25 PROCEDURE — 99232 SBSQ HOSP IP/OBS MODERATE 35: CPT

## 2023-08-25 RX ORDER — KETOROLAC TROMETHAMINE 30 MG/ML
60 SYRINGE (ML) INJECTION ONCE
Refills: 0 | Status: DISCONTINUED | OUTPATIENT
Start: 2023-08-25 | End: 2023-08-25

## 2023-08-25 RX ADMIN — Medication 1 MILLIGRAM(S): at 11:46

## 2023-08-25 RX ADMIN — Medication 650 MILLIGRAM(S): at 13:03

## 2023-08-25 RX ADMIN — Medication 650 MILLIGRAM(S): at 05:01

## 2023-08-25 RX ADMIN — APIXABAN 5 MILLIGRAM(S): 2.5 TABLET, FILM COATED ORAL at 17:16

## 2023-08-25 RX ADMIN — PANTOPRAZOLE SODIUM 40 MILLIGRAM(S): 20 TABLET, DELAYED RELEASE ORAL at 05:01

## 2023-08-25 RX ADMIN — Medication 650 MILLIGRAM(S): at 22:00

## 2023-08-25 RX ADMIN — APIXABAN 5 MILLIGRAM(S): 2.5 TABLET, FILM COATED ORAL at 05:00

## 2023-08-25 RX ADMIN — Medication 60 MILLIGRAM(S): at 23:15

## 2023-08-25 RX ADMIN — Medication 60 MILLIGRAM(S): at 22:00

## 2023-08-25 RX ADMIN — Medication 0.3 MILLIGRAM(S): at 11:46

## 2023-08-25 RX ADMIN — Medication 100 MILLIGRAM(S): at 11:47

## 2023-08-25 NOTE — PROGRESS NOTE ADULT - SUBJECTIVE AND OBJECTIVE BOX
patient seen and examined  frustrated upset wants update on insurance appeal  SW on case  Review of Systems:  General:denies fever chills, headache, weakness  HEENT: denies blurry vision,diffculty swallowing, difficulty hearing, tinnitus  Cardiovascular: denies chest pain  ,palpitations  Pulmonary:denies shortness of breath, cough, wheezing, hemoptysis  Gastrointestinal: denies abdominal pain, constipation, diarrhea,nausea , vomiting, hematochezia  : denies hematuria, dysuria, or incontinence  Neurological: denies weakness, numbness , tingling, dizziness, tremors  MSK: denies muscle pain, difficulty ambulating, swelling, back pain  skin: denies skin rash, itching, burning, or  skin lesions  Psychiatrical: denies mood disturbances, anxierty, feeling depressed, depression , or difficulty sleeping    Objective:  Vitals  Vital Signs Last 24 Hrs  T(C): 36.4 (25 Aug 2023 10:50), Max: 37.2 (24 Aug 2023 23:38)  T(F): 97.6 (25 Aug 2023 10:50), Max: 99 (24 Aug 2023 23:38)  HR: 60 (25 Aug 2023 10:50) (55 - 64)  BP: 107/65 (25 Aug 2023 10:50) (95/59 - 107/65)  BP(mean): --  RR: 18 (25 Aug 2023 10:50) (18 - 18)  SpO2: 100% (25 Aug 2023 10:50) (100% - 100%)    Parameters below as of 25 Aug 2023 10:50  Patient On (Oxygen Delivery Method): nasal cannula        Physical Exam:  General: comfortable, no acute distress  HEENT: Atraumatic, no LAD, trachea midline, PERRLA  Cardiovascular: normal s1s2, no murmurs, gallops or fricition rubs  Pulmonary: clear to ausculation Bilaterally, no wheezing , rhonchi  Gastrointestinal: soft non tender non distended, no masses felt, no organomegally  Muscloskeletal: no lower extremity edema, intact bilateral lower extremity pulses  Neurological: CN II-12 intact. No focal weakness  Psychiatrical: normal mood, cooperative  SKIN: no rash, lesions or ulcers    Labs:                          7.8    6.14  )-----------( 297      ( 23 Aug 2023 06:10 )             26.1     08-23    142  |  113<H>  |  20  ----------------------------<  82  3.8   |  24  |  2.43<H>    Ca    7.6<L>      23 Aug 2023 06:10  Phos  3.6     08-23  Mg     1.5     08-23              Active Medications  MEDICATIONS  (STANDING):  allopurinol 100 milliGRAM(s) Oral daily  apixaban 5 milliGRAM(s) Oral <User Schedule>  bisacodyl 20 milliGRAM(s) Oral once  budesonide 160 MICROgram(s)/formoterol 4.5 MICROgram(s) Inhaler 2 Puff(s) Inhalation two times a day  colchicine 0.3 milliGRAM(s) Oral daily  folic acid 1 milliGRAM(s) Oral daily  pantoprazole    Tablet 40 milliGRAM(s) Oral before breakfast  sodium bicarbonate 650 milliGRAM(s) Oral three times a day  tiotropium 2.5 MICROgram(s) Inhaler 2 Puff(s) Inhalation daily    MEDICATIONS  (PRN):  acetaminophen     Tablet .. 650 milliGRAM(s) Oral every 6 hours PRN Temp greater or equal to 38C (100.4F), Mild Pain (1 - 3)  albuterol    90 MICROgram(s) HFA Inhaler 2 Puff(s) Inhalation every 6 hours PRN Shortness of Breath and/or Wheezing  ondansetron Injectable 4 milliGRAM(s) IntraMuscular every 6 hours PRN Nausea and/or Vomiting

## 2023-08-25 NOTE — PROGRESS NOTE ADULT - SUBJECTIVE AND OBJECTIVE BOX
[facility] NEPHROLOGY SERVICES, [facility]  NEPHROLOGY AND HYPERTENSION  [address]  [address]  [place] [address]  [phone]    MD ANNE MARIE HEWITT, MD CHEYENNE LING MD CHRISTOPHER CAPUTO, MD CATALINA KIRBY MD          Patient events noted    MEDICATIONS  (STANDING):  allopurinol 100 milliGRAM(s) Oral daily  apixaban 5 milliGRAM(s) Oral <User Schedule>  bisacodyl 20 milliGRAM(s) Oral once  budesonide 160 MICROgram(s)/formoterol 4.5 MICROgram(s) Inhaler 2 Puff(s) Inhalation two times a day  colchicine 0.3 milliGRAM(s) Oral daily  folic acid 1 milliGRAM(s) Oral daily  pantoprazole    Tablet 40 milliGRAM(s) Oral before breakfast  sodium bicarbonate 650 milliGRAM(s) Oral three times a day  tiotropium 2.5 MICROgram(s) Inhaler 2 Puff(s) Inhalation daily    MEDICATIONS  (PRN):  acetaminophen     Tablet .. 650 milliGRAM(s) Oral every 6 hours PRN Temp greater or equal to 38C (100.4F), Mild Pain (1 - 3)  albuterol    90 MICROgram(s) HFA Inhaler 2 Puff(s) Inhalation every 6 hours PRN Shortness of Breath and/or Wheezing  ondansetron Injectable 4 milliGRAM(s) IntraMuscular every 6 hours PRN Nausea and/or Vomiting      08-24-23 @ 07:01  -  08-25-23 @ 07:00  --------------------------------------------------------  IN: 480 mL / OUT: 451 mL / NET: 29 mL    08-25-23 @ 07:01  -  08-25-23 @ 21:08  --------------------------------------------------------  IN: 0 mL / OUT: 400 mL / NET: -400 mL      PHYSICAL EXAM:      T(C): 36.9 (08-25-23 @ 15:42), Max: 37.2 (08-24-23 @ 23:38)  HR: 63 (08-25-23 @ 15:42) (55 - 63)  BP: 110/64 (08-25-23 @ 15:42) (95/59 - 110/64)  RR: 18 (08-25-23 @ 15:42) (18 - 18)  SpO2: 100% (08-25-23 @ 15:42) (100% - 100%)  Wt(kg): --  Lungs clear  Heart S1S2  Abd soft NT ND  Extremities:   tr edema                          Creatinine Trend: 2.43<--, 2.46<--, 2.53<--, 2.63<--, 2.62<--, 2.66<--      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

## 2023-08-26 LAB
ANION GAP SERPL CALC-SCNC: 3 MMOL/L — LOW (ref 5–17)
BASOPHILS # BLD AUTO: 0.01 K/UL — SIGNIFICANT CHANGE UP (ref 0–0.2)
BASOPHILS NFR BLD AUTO: 0.2 % — SIGNIFICANT CHANGE UP (ref 0–2)
BUN SERPL-MCNC: 23 MG/DL — SIGNIFICANT CHANGE UP (ref 7–23)
CALCIUM SERPL-MCNC: 7.7 MG/DL — LOW (ref 8.5–10.1)
CHLORIDE SERPL-SCNC: 111 MMOL/L — HIGH (ref 96–108)
CO2 SERPL-SCNC: 26 MMOL/L — SIGNIFICANT CHANGE UP (ref 22–31)
CREAT SERPL-MCNC: 2.75 MG/DL — HIGH (ref 0.5–1.3)
EGFR: 18 ML/MIN/1.73M2 — LOW
EOSINOPHIL # BLD AUTO: 0.1 K/UL — SIGNIFICANT CHANGE UP (ref 0–0.5)
EOSINOPHIL NFR BLD AUTO: 2 % — SIGNIFICANT CHANGE UP (ref 0–6)
GLUCOSE SERPL-MCNC: 86 MG/DL — SIGNIFICANT CHANGE UP (ref 70–99)
HCT VFR BLD CALC: 25.7 % — LOW (ref 34.5–45)
HGB BLD-MCNC: 7.8 G/DL — LOW (ref 11.5–15.5)
IMM GRANULOCYTES NFR BLD AUTO: 0.6 % — SIGNIFICANT CHANGE UP (ref 0–0.9)
LYMPHOCYTES # BLD AUTO: 1.71 K/UL — SIGNIFICANT CHANGE UP (ref 1–3.3)
LYMPHOCYTES # BLD AUTO: 33.6 % — SIGNIFICANT CHANGE UP (ref 13–44)
MAGNESIUM SERPL-MCNC: 1.7 MG/DL — SIGNIFICANT CHANGE UP (ref 1.6–2.6)
MCHC RBC-ENTMCNC: 30.4 G/DL — LOW (ref 32–36)
MCHC RBC-ENTMCNC: 32.1 PG — SIGNIFICANT CHANGE UP (ref 27–34)
MCV RBC AUTO: 105.8 FL — HIGH (ref 80–100)
MONOCYTES # BLD AUTO: 0.38 K/UL — SIGNIFICANT CHANGE UP (ref 0–0.9)
MONOCYTES NFR BLD AUTO: 7.5 % — SIGNIFICANT CHANGE UP (ref 2–14)
NEUTROPHILS # BLD AUTO: 2.86 K/UL — SIGNIFICANT CHANGE UP (ref 1.8–7.4)
NEUTROPHILS NFR BLD AUTO: 56.1 % — SIGNIFICANT CHANGE UP (ref 43–77)
NRBC # BLD: 0 /100 WBCS — SIGNIFICANT CHANGE UP (ref 0–0)
PHOSPHATE SERPL-MCNC: 3.8 MG/DL — SIGNIFICANT CHANGE UP (ref 2.5–4.5)
PLATELET # BLD AUTO: 311 K/UL — SIGNIFICANT CHANGE UP (ref 150–400)
POTASSIUM SERPL-MCNC: 4.2 MMOL/L — SIGNIFICANT CHANGE UP (ref 3.5–5.3)
POTASSIUM SERPL-SCNC: 4.2 MMOL/L — SIGNIFICANT CHANGE UP (ref 3.5–5.3)
RBC # BLD: 2.43 M/UL — LOW (ref 3.8–5.2)
RBC # FLD: 15.2 % — HIGH (ref 10.3–14.5)
SODIUM SERPL-SCNC: 140 MMOL/L — SIGNIFICANT CHANGE UP (ref 135–145)
WBC # BLD: 5.09 K/UL — SIGNIFICANT CHANGE UP (ref 3.8–10.5)
WBC # FLD AUTO: 5.09 K/UL — SIGNIFICANT CHANGE UP (ref 3.8–10.5)

## 2023-08-26 PROCEDURE — 99232 SBSQ HOSP IP/OBS MODERATE 35: CPT

## 2023-08-26 RX ADMIN — PANTOPRAZOLE SODIUM 40 MILLIGRAM(S): 20 TABLET, DELAYED RELEASE ORAL at 06:14

## 2023-08-26 RX ADMIN — Medication 650 MILLIGRAM(S): at 22:36

## 2023-08-26 RX ADMIN — APIXABAN 5 MILLIGRAM(S): 2.5 TABLET, FILM COATED ORAL at 05:42

## 2023-08-26 RX ADMIN — Medication 0.3 MILLIGRAM(S): at 11:31

## 2023-08-26 RX ADMIN — Medication 650 MILLIGRAM(S): at 05:42

## 2023-08-26 RX ADMIN — Medication 1 MILLIGRAM(S): at 11:31

## 2023-08-26 RX ADMIN — Medication 100 MILLIGRAM(S): at 11:31

## 2023-08-26 RX ADMIN — APIXABAN 5 MILLIGRAM(S): 2.5 TABLET, FILM COATED ORAL at 18:03

## 2023-08-26 RX ADMIN — Medication 650 MILLIGRAM(S): at 13:38

## 2023-08-26 NOTE — PROGRESS NOTE ADULT - SUBJECTIVE AND OBJECTIVE BOX
patient seen and examined  frustrated upset wants update on insurance appeal  SW on case  Review of Systems:  unable    Objective:  Vitals  reviewed          Physical Exam:  General: comfortable, no acute distress  HEENT: Atraumatic, no LAD, trachea midline, PERRLA  Cardiovascular: normal s1s2, no murmurs, gallops or fricition rubs  Pulmonary: clear to ausculation Bilaterally, no wheezing , rhonchi  Gastrointestinal: soft non tender non distended, no masses felt, no organomegally  Muscloskeletal: no lower extremity edema, intact bilateral lower extremity pulses  Neurological: CN II-12 intact. No focal weakness  Psychiatrical: normal mood, cooperative ax01  SKIN: no rash, lesions or ulcers    Labs:                          7.8    6.14  )-----------( 297      ( 23 Aug 2023 06:10 )             26.1     08-23    142  |  113<H>  |  20  ----------------------------<  82  3.8   |  24  |  2.43<H>    Ca    7.6<L>      23 Aug 2023 06:10  Phos  3.6     08-23  Mg     1.5     08-23              Active Medications  MEDICATIONS  (STANDING):  allopurinol 100 milliGRAM(s) Oral daily  apixaban 5 milliGRAM(s) Oral <User Schedule>  bisacodyl 20 milliGRAM(s) Oral once  budesonide 160 MICROgram(s)/formoterol 4.5 MICROgram(s) Inhaler 2 Puff(s) Inhalation two times a day  colchicine 0.3 milliGRAM(s) Oral daily  folic acid 1 milliGRAM(s) Oral daily  pantoprazole    Tablet 40 milliGRAM(s) Oral before breakfast  sodium bicarbonate 650 milliGRAM(s) Oral three times a day  tiotropium 2.5 MICROgram(s) Inhaler 2 Puff(s) Inhalation daily    MEDICATIONS  (PRN):  acetaminophen     Tablet .. 650 milliGRAM(s) Oral every 6 hours PRN Temp greater or equal to 38C (100.4F), Mild Pain (1 - 3)  albuterol    90 MICROgram(s) HFA Inhaler 2 Puff(s) Inhalation every 6 hours PRN Shortness of Breath and/or Wheezing  ondansetron Injectable 4 milliGRAM(s) IntraMuscular every 6 hours PRN Nausea and/or Vomiting     patient seen and examined  frustrated upset wants update on insurance appeal  SW on case  Review of Systems:  unable    Objective:  Vitals  reviewed          Physical Exam:  General: comfortable, no acute distress  HEENT: Atraumatic, no LAD, trachea midline, PERRLA  Cardiovascular: normal s1s2, no murmurs, gallops or fricition rubs  Pulmonary: clear to ausculation Bilaterally, no wheezing , rhonchi  Gastrointestinal: soft non tender non distended, no masses felt, no organomegally  Muscloskeletal: no lower extremity edema, intact bilateral lower extremity pulses  Neurological: CN II-12 intact. No focal weakness  Psychiatrical: normal mood, cooperative   SKIN: no rash, lesions or ulcers    Labs:                          7.8    5.09  )-----------( 311      ( 26 Aug 2023 05:57 )             25.7     08-26    140  |  111<H>  |  23  ----------------------------<  86  4.2   |  26  |  2.75<H>    Ca    7.7<L>      26 Aug 2023 05:57  Phos  3.8     08-26  Mg     1.7     08-26              Active Medications  MEDICATIONS  (STANDING):  allopurinol 100 milliGRAM(s) Oral daily  apixaban 5 milliGRAM(s) Oral <User Schedule>  bisacodyl 20 milliGRAM(s) Oral once  budesonide 160 MICROgram(s)/formoterol 4.5 MICROgram(s) Inhaler 2 Puff(s) Inhalation two times a day  colchicine 0.3 milliGRAM(s) Oral daily  folic acid 1 milliGRAM(s) Oral daily  pantoprazole    Tablet 40 milliGRAM(s) Oral before breakfast  sodium bicarbonate 650 milliGRAM(s) Oral three times a day  tiotropium 2.5 MICROgram(s) Inhaler 2 Puff(s) Inhalation daily    MEDICATIONS  (PRN):  acetaminophen     Tablet .. 650 milliGRAM(s) Oral every 6 hours PRN Temp greater or equal to 38C (100.4F), Mild Pain (1 - 3)  albuterol    90 MICROgram(s) HFA Inhaler 2 Puff(s) Inhalation every 6 hours PRN Shortness of Breath and/or Wheezing  ondansetron Injectable 4 milliGRAM(s) IntraMuscular every 6 hours PRN Nausea and/or Vomiting

## 2023-08-27 PROCEDURE — 99232 SBSQ HOSP IP/OBS MODERATE 35: CPT

## 2023-08-27 RX ADMIN — APIXABAN 5 MILLIGRAM(S): 2.5 TABLET, FILM COATED ORAL at 06:05

## 2023-08-27 RX ADMIN — Medication 650 MILLIGRAM(S): at 13:33

## 2023-08-27 RX ADMIN — Medication 1 MILLIGRAM(S): at 12:26

## 2023-08-27 RX ADMIN — PANTOPRAZOLE SODIUM 40 MILLIGRAM(S): 20 TABLET, DELAYED RELEASE ORAL at 06:05

## 2023-08-27 RX ADMIN — Medication 0.3 MILLIGRAM(S): at 12:21

## 2023-08-27 RX ADMIN — Medication 650 MILLIGRAM(S): at 06:05

## 2023-08-27 RX ADMIN — Medication 100 MILLIGRAM(S): at 12:21

## 2023-08-27 RX ADMIN — APIXABAN 5 MILLIGRAM(S): 2.5 TABLET, FILM COATED ORAL at 17:22

## 2023-08-27 NOTE — PROGRESS NOTE ADULT - SUBJECTIVE AND OBJECTIVE BOX
patient seen and examined  no complaints  awaiting appeal  can only walk five feet    Review of Systems:  General:denies fever chills, headache, weakness  HEENT: denies blurry vision,diffculty swallowing, difficulty hearing, tinnitus  Cardiovascular: denies chest pain  ,palpitations  Pulmonary:denies shortness of breath, cough, wheezing, hemoptysis  Gastrointestinal: denies abdominal pain, constipation, diarrhea,nausea , vomiting, hematochezia  : denies hematuria, dysuria, or incontinence  Neurological: denies weakness, numbness , tingling, dizziness, tremors  MSK: denies muscle pain, difficulty ambulating, swelling, back pain  skin: denies skin rash, itching, burning, or  skin lesions  Psychiatrical: denies mood disturbances, anxierty, feeling depressed, depression , or difficulty sleeping    Objective:  Vitals  T(C): 36.2 (08-27-23 @ 05:18), Max: 36.6 (08-26-23 @ 10:48)  HR: 57 (08-27-23 @ 05:18) (57 - 64)  BP: 102/50 (08-27-23 @ 05:18) (101/52 - 115/69)  RR: 18 (08-27-23 @ 05:18) (16 - 18)  SpO2: 100% (08-27-23 @ 05:18) (94% - 100%)    Physical Exam:  General: comfortable, no acute distress  HEENT: Atraumatic, no LAD, trachea midline, PERRLA  Cardiovascular: normal s1s2, no murmurs, gallops or fricition rubs  Pulmonary: clear to ausculation Bilaterally, no wheezing , rhonchi  Gastrointestinal: soft non tender non distended, no masses felt, no organomegally  Muscloskeletal: no lower extremity edema, intact bilateral lower extremity pulses  Neurological: CN II-12 intact. No focal weakness  Psychiatrical: normal mood, cooperative  SKIN: no rash, lesions or ulcers    Labs:                          7.8    5.09  )-----------( 311      ( 26 Aug 2023 05:57 )             25.7     08-26    140  |  111<H>  |  23  ----------------------------<  86  4.2   |  26  |  2.75<H>    Ca    7.7<L>      26 Aug 2023 05:57  Phos  3.8     08-26  Mg     1.7     08-26              Active Medications  MEDICATIONS  (STANDING):  allopurinol 100 milliGRAM(s) Oral daily  apixaban 5 milliGRAM(s) Oral <User Schedule>  bisacodyl 20 milliGRAM(s) Oral once  budesonide 160 MICROgram(s)/formoterol 4.5 MICROgram(s) Inhaler 2 Puff(s) Inhalation two times a day  colchicine 0.3 milliGRAM(s) Oral daily  folic acid 1 milliGRAM(s) Oral daily  pantoprazole    Tablet 40 milliGRAM(s) Oral before breakfast  sodium bicarbonate 650 milliGRAM(s) Oral three times a day  tiotropium 2.5 MICROgram(s) Inhaler 2 Puff(s) Inhalation daily    MEDICATIONS  (PRN):  acetaminophen     Tablet .. 650 milliGRAM(s) Oral every 6 hours PRN Temp greater or equal to 38C (100.4F), Mild Pain (1 - 3)  albuterol    90 MICROgram(s) HFA Inhaler 2 Puff(s) Inhalation every 6 hours PRN Shortness of Breath and/or Wheezing  ondansetron Injectable 4 milliGRAM(s) IntraMuscular every 6 hours PRN Nausea and/or Vomiting

## 2023-08-28 ENCOUNTER — RX RENEWAL (OUTPATIENT)
Age: 68
End: 2023-08-28

## 2023-08-28 ENCOUNTER — TRANSCRIPTION ENCOUNTER (OUTPATIENT)
Age: 68
End: 2023-08-28

## 2023-08-28 VITALS — SYSTOLIC BLOOD PRESSURE: 114 MMHG | DIASTOLIC BLOOD PRESSURE: 75 MMHG

## 2023-08-28 LAB
HCT VFR BLD CALC: 30.5 % — LOW (ref 34.5–45)
HGB BLD-MCNC: 9.2 G/DL — LOW (ref 11.5–15.5)
MCHC RBC-ENTMCNC: 30.2 G/DL — LOW (ref 32–36)
MCHC RBC-ENTMCNC: 32.3 PG — SIGNIFICANT CHANGE UP (ref 27–34)
MCV RBC AUTO: 107 FL — HIGH (ref 80–100)
NRBC # BLD: 0 /100 WBCS — SIGNIFICANT CHANGE UP (ref 0–0)
PLATELET # BLD AUTO: 337 K/UL — SIGNIFICANT CHANGE UP (ref 150–400)
RBC # BLD: 2.85 M/UL — LOW (ref 3.8–5.2)
RBC # FLD: 15.1 % — HIGH (ref 10.3–14.5)
TSH SERPL-MCNC: 5.56 UIU/ML — HIGH (ref 0.36–3.74)
WBC # BLD: 5.68 K/UL — SIGNIFICANT CHANGE UP (ref 3.8–10.5)
WBC # FLD AUTO: 5.68 K/UL — SIGNIFICANT CHANGE UP (ref 3.8–10.5)

## 2023-08-28 PROCEDURE — 99239 HOSP IP/OBS DSCHRG MGMT >30: CPT

## 2023-08-28 RX ORDER — ERYTHROPOIETIN 10000 [IU]/ML
10000 INJECTION, SOLUTION INTRAVENOUS; SUBCUTANEOUS ONCE
Refills: 0 | Status: COMPLETED | OUTPATIENT
Start: 2023-08-28 | End: 2023-08-28

## 2023-08-28 RX ORDER — PANTOPRAZOLE SODIUM 20 MG/1
1 TABLET, DELAYED RELEASE ORAL
Qty: 0 | Refills: 0 | DISCHARGE
Start: 2023-08-28

## 2023-08-28 RX ORDER — SPIRONOLACTONE 25 MG/1
1 TABLET, FILM COATED ORAL
Refills: 0 | DISCHARGE

## 2023-08-28 RX ORDER — SODIUM BICARBONATE 1 MEQ/ML
1 SYRINGE (ML) INTRAVENOUS
Qty: 0 | Refills: 0 | DISCHARGE
Start: 2023-08-28

## 2023-08-28 RX ORDER — ERYTHROPOIETIN 10000 [IU]/ML
10000 INJECTION, SOLUTION INTRAVENOUS; SUBCUTANEOUS ONCE
Refills: 0 | Status: DISCONTINUED | OUTPATIENT
Start: 2023-08-28 | End: 2023-08-28

## 2023-08-28 RX ORDER — ALLOPURINOL 300 MG
1 TABLET ORAL
Qty: 0 | Refills: 0 | DISCHARGE
Start: 2023-08-28

## 2023-08-28 RX ADMIN — Medication 650 MILLIGRAM(S): at 05:52

## 2023-08-28 RX ADMIN — PANTOPRAZOLE SODIUM 40 MILLIGRAM(S): 20 TABLET, DELAYED RELEASE ORAL at 07:48

## 2023-08-28 RX ADMIN — Medication 1 MILLIGRAM(S): at 11:21

## 2023-08-28 RX ADMIN — ERYTHROPOIETIN 10000 UNIT(S): 10000 INJECTION, SOLUTION INTRAVENOUS; SUBCUTANEOUS at 15:53

## 2023-08-28 RX ADMIN — Medication 0.3 MILLIGRAM(S): at 11:21

## 2023-08-28 RX ADMIN — APIXABAN 5 MILLIGRAM(S): 2.5 TABLET, FILM COATED ORAL at 05:52

## 2023-08-28 RX ADMIN — Medication 100 MILLIGRAM(S): at 11:21

## 2023-08-28 RX ADMIN — Medication 650 MILLIGRAM(S): at 13:04

## 2023-08-28 NOTE — PROGRESS NOTE ADULT - REASON FOR ADMISSION
GI bleeding

## 2023-08-28 NOTE — PROGRESS NOTE ADULT - SUBJECTIVE AND OBJECTIVE BOX
patient seen and examined  no complaints  awaiting fast appeal    Review of Systems:  General:denies fever chills, headache, weakness  HEENT: denies blurry vision,diffculty swallowing, difficulty hearing, tinnitus  Cardiovascular: denies chest pain  ,palpitations  Pulmonary:denies shortness of breath, cough, wheezing, hemoptysis  Gastrointestinal: denies abdominal pain, constipation, diarrhea,nausea , vomiting, hematochezia  : denies hematuria, dysuria, or incontinence  Neurological: denies weakness, numbness , tingling, dizziness, tremors  MSK: denies muscle pain, difficulty ambulating, swelling, back pain  skin: denies skin rash, itching, burning, or  skin lesions  Psychiatrical: denies mood disturbances, anxierty, feeling depressed, depression , or difficulty sleeping    Objective:  Vitals  T(C): 36.2 (08-27-23 @ 05:18), Max: 36.6 (08-26-23 @ 10:48)  HR: 57 (08-27-23 @ 05:18) (57 - 64)  BP: 102/50 (08-27-23 @ 05:18) (101/52 - 115/69)  RR: 18 (08-27-23 @ 05:18) (16 - 18)  SpO2: 100% (08-27-23 @ 05:18) (94% - 100%)    Physical Exam:  General: comfortable, no acute distress  HEENT: Atraumatic, no LAD, trachea midline, PERRLA  Cardiovascular: normal s1s2, no murmurs, gallops or fricition rubs  Pulmonary: clear to ausculation Bilaterally, no wheezing , rhonchi  Gastrointestinal: soft non tender non distended, no masses felt, no organomegally  Muscloskeletal: no lower extremity edema, intact bilateral lower extremity pulses  Neurological: CN II-12 intact. No focal weakness  Psychiatrical: normal mood, cooperative  SKIN: no rash, lesions or ulcers    Labs:                          7.8    5.09  )-----------( 311      ( 26 Aug 2023 05:57 )             25.7     08-26    140  |  111<H>  |  23  ----------------------------<  86  4.2   |  26  |  2.75<H>    Ca    7.7<L>      26 Aug 2023 05:57  Phos  3.8     08-26  Mg     1.7     08-26              Active Medications  MEDICATIONS  (STANDING):  allopurinol 100 milliGRAM(s) Oral daily  apixaban 5 milliGRAM(s) Oral <User Schedule>  bisacodyl 20 milliGRAM(s) Oral once  budesonide 160 MICROgram(s)/formoterol 4.5 MICROgram(s) Inhaler 2 Puff(s) Inhalation two times a day  colchicine 0.3 milliGRAM(s) Oral daily  folic acid 1 milliGRAM(s) Oral daily  pantoprazole    Tablet 40 milliGRAM(s) Oral before breakfast  sodium bicarbonate 650 milliGRAM(s) Oral three times a day  tiotropium 2.5 MICROgram(s) Inhaler 2 Puff(s) Inhalation daily    MEDICATIONS  (PRN):  acetaminophen     Tablet .. 650 milliGRAM(s) Oral every 6 hours PRN Temp greater or equal to 38C (100.4F), Mild Pain (1 - 3)  albuterol    90 MICROgram(s) HFA Inhaler 2 Puff(s) Inhalation every 6 hours PRN Shortness of Breath and/or Wheezing  ondansetron Injectable 4 milliGRAM(s) IntraMuscular every 6 hours PRN Nausea and/or Vomiting

## 2023-08-28 NOTE — DISCHARGE NOTE NURSING/CASE MANAGEMENT/SOCIAL WORK - PATIENT PORTAL LINK FT
You can access the FollowMyHealth Patient Portal offered by [facility] by registering at the following website: [email or web address]. By joining [facility]’s FollowMyHealth portal, you will also be able to view your health information using other applications (apps) compatible with our system.

## 2023-08-28 NOTE — PROGRESS NOTE ADULT - SUBJECTIVE AND OBJECTIVE BOX
[facility] NEPHROLOGY SERVICES, [facility]  NEPHROLOGY AND HYPERTENSION  [address]  [address]  [place] [address]  [phone]    MD ANNE MARIE HEWITT MD YELENA ROSENBERG, MD BINNY KOSHY, MD CHRISTOPHER CAPUTO, MD EDWARD BOVER, MD          Patient events noted  Intermittent dizziness   No LOC;   Describes longstanding hx of syncopal and pre syncopal episodes assessed in the past, no overt culprit    MEDICATIONS  (STANDING):  allopurinol 100 milliGRAM(s) Oral daily  apixaban 5 milliGRAM(s) Oral <User Schedule>  bisacodyl 20 milliGRAM(s) Oral once  budesonide 160 MICROgram(s)/formoterol 4.5 MICROgram(s) Inhaler 2 Puff(s) Inhalation two times a day  colchicine 0.3 milliGRAM(s) Oral daily  folic acid 1 milliGRAM(s) Oral daily  pantoprazole    Tablet 40 milliGRAM(s) Oral before breakfast  sodium bicarbonate 650 milliGRAM(s) Oral three times a day  tiotropium 2.5 MICROgram(s) Inhaler 2 Puff(s) Inhalation daily    MEDICATIONS  (PRN):  acetaminophen     Tablet .. 650 milliGRAM(s) Oral every 6 hours PRN Temp greater or equal to 38C (100.4F), Mild Pain (1 - 3)  albuterol    90 MICROgram(s) HFA Inhaler 2 Puff(s) Inhalation every 6 hours PRN Shortness of Breath and/or Wheezing  ondansetron Injectable 4 milliGRAM(s) IntraMuscular every 6 hours PRN Nausea and/or Vomiting      08-27-23 @ 07:01  -  08-28-23 @ 07:00  --------------------------------------------------------  IN: 0 mL / OUT: 300 mL / NET: -300 mL      PHYSICAL EXAM:      T(C): 36.6 (08-28-23 @ 11:32), Max: 36.7 (08-27-23 @ 17:06)  HR: 86 (08-28-23 @ 11:32) (58 - 86)  BP: 114/75 (08-28-23 @ 15:38) (105/61 - 123/74)  RR: 18 (08-28-23 @ 11:32) (17 - 18)  SpO2: 98% (08-28-23 @ 11:32) (98% - 100%)  Wt(kg): --  Lungs clear  Heart S1S2  Abd soft NT ND  Extremities:   tr edema                          Creatinine Trend: 2.75<--, 2.43<--, 2.46<--, 2.53<--, 2.63<--, 2.62<--      VY CKD 3-4; pre renal azotemia, risk for ATN related to acute anemia   Intermittent dizziness, longstanding, unclear etiology   Severe metabolic acidosis; gap/ non gap, better  Gout/hyperuricemia  Stable     Plan  Follow orthostatics   Complete Thyroid panel   NaHCO3 650 mg PO TID  Continue colchicine;  allopurinol 100 mg PO QD;      Harsha Cornelius MD

## 2023-08-28 NOTE — PROGRESS NOTE ADULT - NSPROGADDITIONALINFOA_GEN_ALL_CORE
fast appeal  if denied  send home

## 2023-08-28 NOTE — DISCHARGE NOTE PROVIDER - NSDCMRMEDTOKEN_GEN_ALL_CORE_FT
allopurinol 100 mg oral tablet: 1 tab(s) orally once a day  bisacodyl 5 mg oral delayed release tablet: 4 tab(s) orally once  colchicine 0.6 mg oral capsule: 1 orally once a day  Eliquis 5 mg oral tablet: 1 orally 2 times a day  folic acid 1 mg daily:   pantoprazole 40 mg oral delayed release tablet: 1 tab(s) orally once a day (before a meal)  ProAir HFA 90 mcg/inh inhalation aerosol: 2 puff(s) inhaled every 6 hours as needed for shortness of breath or cough  sodium bicarbonate 650 mg oral tablet: 1 tab(s) orally 3 times a day  Trelegy Ellipta 100 mcg-62.5 mcg-25 mcg/inh inhalation powder: 1 puff(s) inhaled once a day

## 2023-08-28 NOTE — PROGRESS NOTE ADULT - PROVIDER SPECIALTY LIST ADULT
Cardiology
Cardiology
Nephrology
Pulmonology
Cardiology
Gastroenterology
Hospitalist
Internal Medicine
Nephrology
Pulmonology
Cardiology
Cardiology
Hospitalist
Internal Medicine
Internal Medicine
Nephrology
Nephrology
Cardiology
Cardiology
Hospitalist
Internal Medicine
Pulmonology
Pulmonology
Internal Medicine
Internal Medicine

## 2023-08-28 NOTE — DISCHARGE NOTE NURSING/CASE MANAGEMENT/SOCIAL WORK - NSDCPEFALRISK_GEN_ALL_CORE
For information on Fall & Injury Prevention, visit: [email or web address] OR  [email or web address] OR  https://www.cdc.gov/steadi/patient.html

## 2023-08-28 NOTE — DISCHARGE NOTE PROVIDER - NSDCCPCAREPLAN_GEN_ALL_CORE_FT
PRINCIPAL DISCHARGE DIAGNOSIS  Diagnosis: Stage 3 chronic kidney disease  Assessment and Plan of Treatment:   UGIB w/ acute blood loss anemia  - CT abd showed few colonic diverticula without diverticulitis but no abnormal mass along the GI tract  - Hgb stable  - EGD on 8/16 showed no source of melena was identified  - colonoscopy on 8/17 showed internal hemorrhoids, hepatic flexure single angioectasia s/p APC, hepatic flexure 5mm polyp that removed with cold snare and mild pan-diverticulosis - w/ pathology showing tubular adenoma - GI recommended repeat colonoscopy in 7 years  - await - Transfuse to maintain Hgb > 7  - c/w folic acid   VY on CKD IV  - resolved  Metabolic acidosis  - acidosis slowly improving with bicarb drip, and now with oral bicarb  - Nephrology following   Chronic Respiratory Failure with hypoxia due to COPD/ SONU on CPAP  - c/w CPAP & O2 via NC  - c/w Spiriva, Symbicort and Proair   - patient is stable/at baseline  - pulmonary following  Hx of LE DVT   - patient cleared to resume AC by GI  - LE dopplers did not show DVT in either extremity  Right knee joint effusion and small right popliteal fossa Baker's cyst measure   - US showed a large right knee joint effusion with apparent internal complexity and a small right popliteal fossa Baker's cyst   - likely from severe OA  - as per ortho, no intervention  Paroxysmal Atrial fibrillation   - Echo showed EF 55 - 60% w/ grade I diastolic dysfunction w/ severe concentric left ventricular hypertrophy, moderate aortic regurgitation with borderline pulmonary hypertension  - rate controlled  - patient cleared to resume AC by GI and Eliquis now resumed  Gout  - c/w colchicine & allopurinol   - status post prednisone  IUD on CT  - patient is aware and will follow w/ his Gyn  Prophylaxis:  DVT: Eliquis  GI: PO diet  Pt medically stable and pending placement. Peer to peer done today w/ Dr. Olmos and further PT/OT notes were faxed to the insurance company by [facility].         SECONDARY DISCHARGE DIAGNOSES  Diagnosis: Rectal bleeding  Assessment and Plan of Treatment:

## 2023-08-29 LAB — T4 AB SER-ACNC: 5.3 UG/DL — SIGNIFICANT CHANGE UP (ref 4.6–12)

## 2023-09-05 ENCOUNTER — RX CHANGE (OUTPATIENT)
Age: 68
End: 2023-09-05

## 2023-10-02 ENCOUNTER — APPOINTMENT (OUTPATIENT)
Dept: INTERNAL MEDICINE | Facility: CLINIC | Age: 68
End: 2023-10-02
Payer: MEDICARE

## 2023-10-02 ENCOUNTER — LABORATORY RESULT (OUTPATIENT)
Age: 68
End: 2023-10-02

## 2023-10-02 VITALS
DIASTOLIC BLOOD PRESSURE: 60 MMHG | SYSTOLIC BLOOD PRESSURE: 97 MMHG | OXYGEN SATURATION: 98 % | BODY MASS INDEX: 34.25 KG/M2 | WEIGHT: 226 LBS | TEMPERATURE: 97 F | HEIGHT: 68 IN | HEART RATE: 92 BPM

## 2023-10-02 DIAGNOSIS — I27.82 CHRONIC PULMONARY EMBOLISM: ICD-10-CM

## 2023-10-02 DIAGNOSIS — Z23 ENCOUNTER FOR IMMUNIZATION: ICD-10-CM

## 2023-10-02 PROCEDURE — 99214 OFFICE O/P EST MOD 30 MIN: CPT | Mod: 25

## 2023-10-02 PROCEDURE — 90662 IIV NO PRSV INCREASED AG IM: CPT

## 2023-10-02 PROCEDURE — G0008: CPT

## 2023-10-02 RX ORDER — BUDESONIDE AND FORMOTEROL FUMARATE DIHYDRATE 160; 4.5 UG/1; UG/1
160-4.5 AEROSOL RESPIRATORY (INHALATION) TWICE DAILY
Qty: 1 | Refills: 2 | Status: DISCONTINUED | COMMUNITY
Start: 2023-03-29 | End: 2023-10-02

## 2023-10-02 RX ORDER — GLUCOSAMINE HCL/CHONDROITIN SU 500-400 MG
3 CAPSULE ORAL
Qty: 60 | Refills: 0 | Status: ACTIVE | COMMUNITY
Start: 2021-09-08 | End: 1900-01-01

## 2023-10-02 RX ORDER — ATORVASTATIN CALCIUM 80 MG/1
80 TABLET, FILM COATED ORAL
Qty: 90 | Refills: 1 | Status: ACTIVE | COMMUNITY
Start: 2022-02-17 | End: 1900-01-01

## 2023-10-02 RX ORDER — MECLIZINE HYDROCHLORIDE 25 MG/1
25 TABLET ORAL 3 TIMES DAILY
Qty: 15 | Refills: 0 | Status: DISCONTINUED | COMMUNITY
Start: 2021-03-30 | End: 2023-10-02

## 2023-10-02 RX ORDER — ALBUTEROL SULFATE 90 UG/1
108 (90 BASE) INHALANT RESPIRATORY (INHALATION)
Qty: 60 | Refills: 3 | Status: DISCONTINUED | COMMUNITY
Start: 2017-04-07 | End: 2023-10-02

## 2023-10-02 RX ORDER — SIMETHICONE 125 MG/1
125 TABLET, CHEWABLE ORAL
Qty: 120 | Refills: 1 | Status: DISCONTINUED | COMMUNITY
Start: 2022-10-07 | End: 2023-10-02

## 2023-10-02 RX ORDER — FLUTICASONE PROPIONATE 50 UG/1
50 SPRAY, METERED NASAL
Qty: 16 | Refills: 3 | Status: DISCONTINUED | COMMUNITY
Start: 2020-05-11 | End: 2023-10-02

## 2023-10-02 RX ORDER — SODIUM BICARBONATE 650 MG/1
650 TABLET ORAL
Qty: 270 | Refills: 1 | Status: DISCONTINUED | COMMUNITY
Start: 2022-05-31 | End: 2023-10-02

## 2023-10-02 RX ORDER — AZITHROMYCIN 250 MG/1
250 TABLET, FILM COATED ORAL
Qty: 1 | Refills: 0 | Status: DISCONTINUED | COMMUNITY
Start: 2023-03-09 | End: 2023-10-02

## 2023-10-02 RX ORDER — SYRINGE W-NEEDLE,DISPOSAB,3 ML 25GX5/8"
21G X 1-1/2" SYRINGE, EMPTY DISPOSABLE MISCELLANEOUS
Qty: 12 | Refills: 0 | Status: DISCONTINUED | COMMUNITY
Start: 2017-06-22 | End: 2023-10-02

## 2023-10-03 LAB
ALBUMIN SERPL ELPH-MCNC: 3.8 G/DL
ALP BLD-CCNC: 125 U/L
ALT SERPL-CCNC: 9 U/L
ANION GAP SERPL CALC-SCNC: 13 MMOL/L
AST SERPL-CCNC: 20 U/L
BILIRUB SERPL-MCNC: 0.6 MG/DL
BUN SERPL-MCNC: 26 MG/DL
CALCIUM SERPL-MCNC: 8.6 MG/DL
CHLORIDE SERPL-SCNC: 109 MMOL/L
CO2 SERPL-SCNC: 14 MMOL/L
CREAT SERPL-MCNC: 3.11 MG/DL
EGFR: 16 ML/MIN/1.73M2
GLUCOSE SERPL-MCNC: 87 MG/DL
HCT VFR BLD CALC: 34.9 %
HGB BLD-MCNC: 10.2 G/DL
MCHC RBC-ENTMCNC: 29.2 GM/DL
MCHC RBC-ENTMCNC: 32 PG
MCV RBC AUTO: 109.4 FL
PLATELET # BLD AUTO: 199 K/UL
POTASSIUM SERPL-SCNC: 4.6 MMOL/L
PROT SERPL-MCNC: 6.8 G/DL
RBC # BLD: 3.19 M/UL
RBC # FLD: 17.8 %
SODIUM SERPL-SCNC: 136 MMOL/L
TSH SERPL-ACNC: 6.69 UIU/ML
WBC # FLD AUTO: 14.55 K/UL

## 2023-10-04 ENCOUNTER — NON-APPOINTMENT (OUTPATIENT)
Age: 68
End: 2023-10-04

## 2023-10-04 DIAGNOSIS — R53.81 OTHER MALAISE: ICD-10-CM

## 2023-10-11 ENCOUNTER — RX RENEWAL (OUTPATIENT)
Age: 68
End: 2023-10-11

## 2023-10-24 ENCOUNTER — APPOINTMENT (OUTPATIENT)
Dept: PULMONOLOGY | Facility: CLINIC | Age: 68
End: 2023-10-24
Payer: MEDICARE

## 2023-10-24 VITALS
DIASTOLIC BLOOD PRESSURE: 82 MMHG | HEART RATE: 93 BPM | HEIGHT: 68 IN | OXYGEN SATURATION: 98 % | SYSTOLIC BLOOD PRESSURE: 121 MMHG | WEIGHT: 226 LBS | BODY MASS INDEX: 34.25 KG/M2 | TEMPERATURE: 97.8 F

## 2023-10-24 PROCEDURE — 94010 BREATHING CAPACITY TEST: CPT

## 2023-10-24 PROCEDURE — 99214 OFFICE O/P EST MOD 30 MIN: CPT | Mod: 25

## 2023-10-24 RX ORDER — SODIUM CHLORIDE FOR INHALATION 3.5 %
3.5 VIAL, NEBULIZER (ML) INHALATION
Qty: 1 | Refills: 3 | Status: ACTIVE | COMMUNITY
Start: 2023-10-24 | End: 1900-01-01

## 2023-10-24 RX ORDER — BLOOD-GLUCOSE METER
KIT MISCELLANEOUS
Qty: 1 | Refills: 0 | Status: ACTIVE | COMMUNITY
Start: 2023-10-24 | End: 1900-01-01

## 2023-10-30 ENCOUNTER — RX RENEWAL (OUTPATIENT)
Age: 68
End: 2023-10-30

## 2023-12-07 ENCOUNTER — APPOINTMENT (OUTPATIENT)
Dept: PULMONOLOGY | Facility: CLINIC | Age: 68
End: 2023-12-07

## 2023-12-20 ENCOUNTER — NON-APPOINTMENT (OUTPATIENT)
Age: 68
End: 2023-12-20

## 2023-12-20 ENCOUNTER — APPOINTMENT (OUTPATIENT)
Dept: HEART AND VASCULAR | Facility: CLINIC | Age: 68
End: 2023-12-20
Payer: MEDICARE

## 2023-12-20 VITALS
HEIGHT: 68 IN | WEIGHT: 213.25 LBS | BODY MASS INDEX: 32.32 KG/M2 | DIASTOLIC BLOOD PRESSURE: 74 MMHG | HEART RATE: 77 BPM | TEMPERATURE: 97.4 F | SYSTOLIC BLOOD PRESSURE: 110 MMHG | OXYGEN SATURATION: 98 %

## 2023-12-20 DIAGNOSIS — I42.9 CARDIOMYOPATHY, UNSPECIFIED: ICD-10-CM

## 2023-12-20 DIAGNOSIS — I50.22 CHRONIC SYSTOLIC (CONGESTIVE) HEART FAILURE: ICD-10-CM

## 2023-12-20 PROCEDURE — 99215 OFFICE O/P EST HI 40 MIN: CPT | Mod: 25

## 2023-12-20 PROCEDURE — 93000 ELECTROCARDIOGRAM COMPLETE: CPT

## 2023-12-20 RX ORDER — OMEPRAZOLE 40 MG/1
40 CAPSULE, DELAYED RELEASE ORAL
Qty: 90 | Refills: 1 | Status: DISCONTINUED | COMMUNITY
Start: 2017-04-07 | End: 2023-12-20

## 2023-12-20 RX ORDER — PANTOPRAZOLE 40 MG/1
40 TABLET, DELAYED RELEASE ORAL
Refills: 0 | Status: ACTIVE | COMMUNITY

## 2023-12-20 NOTE — HISTORY OF PRESENT ILLNESS
[FreeTextEntry1] : hx CHF/CM/CRI,COPD/asthma has chronic sob both at rest and exertion- uses O2 at night no cp/palpitations

## 2023-12-20 NOTE — PHYSICAL EXAM
[Well Developed] : well developed [Well Nourished] : well nourished [No Acute Distress] : no acute distress [Normal Conjunctiva] : normal conjunctiva [Normal Venous Pressure] : normal venous pressure [No Carotid Bruit] : no carotid bruit [Normal S1, S2] : normal S1, S2 [No Rub] : no rub [No Gallop] : no gallop [Clear Lung Fields] : clear lung fields [Good Air Entry] : good air entry [No Respiratory Distress] : no respiratory distress  [Soft] : abdomen soft [Non Tender] : non-tender [No Masses/organomegaly] : no masses/organomegaly [Normal Bowel Sounds] : normal bowel sounds [No Edema] : no edema [No Cyanosis] : no cyanosis [No Clubbing] : no clubbing [No Rash] : no rash [Moves all extremities] : moves all extremities [Normal Speech] : normal speech [Alert and Oriented] : alert and oriented [de-identified] : renae [de-identified] : 2/4 diastolic murmur [de-identified] : wheelchair

## 2023-12-20 NOTE — DISCUSSION/SUMMARY
[FreeTextEntry1] : EKG:NSR,PRWP, First Degree AVB reviewed records- had negative pharmacologic stress test( as per cardiology note in 2017) has significant AR, and educed LVEF ? why not on hydralazine for CM/CHF she will be seeing  renal soon- continue lipitor for hLD ;spironolactone for CHF/CM/HPTN will get TTE for CHF/AR  Time spent included review of chart as this is first time I am seeing this patient. will get cardiac MRI to assess degree of AR and LVEF if LVEF <45% will add hydralazine for CHF/CM feel sob is due to COPD but will get TTE/MRInow tells me has right breast mass- advised PCP/breast surgeon f/u

## 2023-12-27 NOTE — PROGRESS NOTE ADULT - SUBJECTIVE AND OBJECTIVE BOX
24H hour events:   pt resting  no acute events overnight  MEDICATIONS:      albuterol    90 MICROgram(s) HFA Inhaler 2 Puff(s) Inhalation every 6 hours PRN  budesonide 160 MICROgram(s)/formoterol 4.5 MICROgram(s) Inhaler 2 Puff(s) Inhalation two times a day  tiotropium 2.5 MICROgram(s) Inhaler 2 Puff(s) Inhalation daily    acetaminophen     Tablet .. 650 milliGRAM(s) Oral every 6 hours PRN  ondansetron Injectable 4 milliGRAM(s) IntraMuscular every 6 hours PRN    pantoprazole  Injectable 40 milliGRAM(s) IV Push every 12 hours    allopurinol 100 milliGRAM(s) Oral daily  colchicine 0.3 milliGRAM(s) Oral daily  predniSONE   Tablet 40 milliGRAM(s) Oral daily    folic acid 1 milliGRAM(s) Oral daily  sodium bicarbonate 650 milliGRAM(s) Oral three times a day  sodium chloride 0.45%. 1000 milliLiter(s) IV Continuous <Continuous>          PHYSICAL EXAM:  T(C): 36.3 (08-16-23 @ 11:20), Max: 36.8 (08-16-23 @ 04:55)  HR: 61 (08-16-23 @ 11:20) (57 - 64)  BP: 114/62 (08-16-23 @ 11:20) (114/62 - 153/73)  RR: 19 (08-16-23 @ 11:20) (18 - 19)  SpO2: 100% (08-16-23 @ 11:20) (99% - 100%)  Wt(kg): --  I&O's Summary    15 Aug 2023 07:01  -  16 Aug 2023 07:00  --------------------------------------------------------  IN: 1140 mL / OUT: 1400 mL / NET: -260 mL        Appearance: Normal	  HEENT:   Normal oral mucosa, PERRL, EOMI	  Lymphatic: No lymphadenopathy  Cardiovascular: Normal S1 S2, No JVD, No murmurs, No edema  Respiratory: Lungs coarse clear to auscultation	  Psychiatry: Mood & affect appropriate  Gastrointestinal:  Soft, Non-tender, + BS	  Skin: No rashes, No ecchymoses, No cyanosis	  Neurologic: Non-focal  Extremities: Normal range of motion, No clubbing, cyanosis       LABS:	 	    CBC Full  -  ( 16 Aug 2023 07:34 )  WBC Count : 6.81 K/uL  Hemoglobin : 7.9 g/dL  Hematocrit : 25.0 %  Platelet Count - Automated : 195 K/uL  Mean Cell Volume : 103.3 fl  Mean Cell Hemoglobin : 32.6 pg  Mean Cell Hemoglobin Concentration : 31.6 g/dL  Auto Neutrophil # : x  Auto Lymphocyte # : x  Auto Monocyte # : x  Auto Eosinophil # : x  Auto Basophil # : x  Auto Neutrophil % : x  Auto Lymphocyte % : x  Auto Monocyte % : x  Auto Eosinophil % : x  Auto Basophil % : x    08-16    139  |  114<H>  |  24<H>  ----------------------------<  97  4.4   |  21<L>  |  2.66<H>  08-15    136  |  109<H>  |  24<H>  ----------------------------<  113<H>  4.8   |  21<L>  |  2.80<H>    Ca    8.1<L>      16 Aug 2023 07:34  Ca    8.4<L>      15 Aug 2023 06:45  Phos  3.5     08-16  Phos  3.9     08-15  Mg     2.0     08-16  Mg     2.2     08-15    TPro  6.5  /  Alb  2.5<L>  /  TBili  0.6  /  DBili  x   /  AST  55<H>  /  ALT  85<H>  /  AlkPhos  455<H>  08-16  TPro  6.8  /  Alb  2.3<L>  /  TBili  0.9  /  DBili  x   /  AST  93<H>  /  ALT  117<H>  /  AlkPhos  528<H>  08-15      proBNP:   Lipid Profile:   HgA1c:   TSH:       CARDIAC MARKERS:            TELEMETRY: 	    ECG:  	  RADIOLOGY:  OTHER: 	    PREVIOUS DIAGNOSTIC TESTING:    [ ] Echocardiogram:  [ ]  Catheterization:  [ ] Stress Test:  	  	  ASSESSMENT/PLAN: 	     98

## 2024-01-04 ENCOUNTER — APPOINTMENT (OUTPATIENT)
Dept: CARDIOLOGY | Facility: CLINIC | Age: 69
End: 2024-01-04
Payer: MEDICARE

## 2024-01-04 ENCOUNTER — APPOINTMENT (OUTPATIENT)
Dept: INTERNAL MEDICINE | Facility: CLINIC | Age: 69
End: 2024-01-04
Payer: MEDICARE

## 2024-01-04 VITALS
BODY MASS INDEX: 32.28 KG/M2 | SYSTOLIC BLOOD PRESSURE: 102 MMHG | HEART RATE: 72 BPM | OXYGEN SATURATION: 95 % | WEIGHT: 213 LBS | DIASTOLIC BLOOD PRESSURE: 68 MMHG | HEIGHT: 68 IN | TEMPERATURE: 97.6 F

## 2024-01-04 DIAGNOSIS — I35.1 NONRHEUMATIC AORTIC (VALVE) INSUFFICIENCY: ICD-10-CM

## 2024-01-04 DIAGNOSIS — N39.0 URINARY TRACT INFECTION, SITE NOT SPECIFIED: ICD-10-CM

## 2024-01-04 DIAGNOSIS — R06.02 SHORTNESS OF BREATH: ICD-10-CM

## 2024-01-04 DIAGNOSIS — A49.9 URINARY TRACT INFECTION, SITE NOT SPECIFIED: ICD-10-CM

## 2024-01-04 PROCEDURE — 99213 OFFICE O/P EST LOW 20 MIN: CPT

## 2024-01-04 PROCEDURE — 93306 TTE W/DOPPLER COMPLETE: CPT

## 2024-01-04 NOTE — PHYSICAL EXAM
[No Acute Distress] : no acute distress [Normal Sclera/Conjunctiva] : normal sclera/conjunctiva [Normal Outer Ear/Nose] : the outer ears and nose were normal in appearance [No Respiratory Distress] : no respiratory distress  [No Accessory Muscle Use] : no accessory muscle use [Coordination Grossly Intact] : coordination grossly intact [No Focal Deficits] : no focal deficits [Normal Affect] : the affect was normal [Alert and Oriented x3] : oriented to person, place, and time [Normal Insight/Judgement] : insight and judgment were intact [de-identified] : talkative as per normal baseline [de-identified] : in a wheelchair

## 2024-01-04 NOTE — HEALTH RISK ASSESSMENT
[Yes] : Yes [4 or more  times a week (4 pts)] : 4 or more  times a week (4 points) [1 or 2 (0 pts)] : 1 or 2 (0 points) [Never (0 pts)] : Never (0 points) [No] : In the past 12 months have you used drugs other than those required for medical reasons? 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

## 2024-02-12 ENCOUNTER — APPOINTMENT (OUTPATIENT)
Dept: INTERNAL MEDICINE | Facility: CLINIC | Age: 69
End: 2024-02-12
Payer: MEDICARE

## 2024-02-12 ENCOUNTER — LABORATORY RESULT (OUTPATIENT)
Age: 69
End: 2024-02-12

## 2024-02-12 VITALS
OXYGEN SATURATION: 100 % | BODY MASS INDEX: 31.67 KG/M2 | HEART RATE: 74 BPM | HEIGHT: 68 IN | SYSTOLIC BLOOD PRESSURE: 122 MMHG | DIASTOLIC BLOOD PRESSURE: 70 MMHG | TEMPERATURE: 97.1 F | WEIGHT: 209 LBS

## 2024-02-12 DIAGNOSIS — D64.9 ANEMIA, UNSPECIFIED: ICD-10-CM

## 2024-02-12 PROCEDURE — 99214 OFFICE O/P EST MOD 30 MIN: CPT

## 2024-02-12 PROCEDURE — 36415 COLL VENOUS BLD VENIPUNCTURE: CPT

## 2024-02-12 RX ORDER — SULFAMETHOXAZOLE AND TRIMETHOPRIM 400; 80 MG/1; MG/1
400-80 TABLET ORAL DAILY
Qty: 5 | Refills: 0 | Status: DISCONTINUED | COMMUNITY
Start: 2024-01-04 | End: 2024-02-12

## 2024-02-12 NOTE — HEALTH RISK ASSESSMENT
[Yes] : Yes [2 - 3 times a week (3 pts)] : 2 - 3  times a week (3 points) [1 or 2 (0 pts)] : 1 or 2 (0 points) [Never (0 pts)] : Never (0 points) [No] : In the past 12 months have you used drugs other than those required for medical reasons? No [No falls in past year] : Patient reported no falls in the past year [0] : 1) Little interest or pleasure doing things: Not at all (0) [Feeling down, depressed, or hopeless] : 2) Feeling down, depressed, or hopeless [3] : 2) Feeling down, depressed, or hopeless for nearly every day (3) [Never] : Never [de-identified] : no [de-identified] : no [Audit-CScore] : 3 [de-identified] : walk a little [de-identified] : terrible

## 2024-02-12 NOTE — PHYSICAL EXAM
[No Acute Distress] : no acute distress [Normal Sclera/Conjunctiva] : normal sclera/conjunctiva [Normal Outer Ear/Nose] : the outer ears and nose were normal in appearance [No Respiratory Distress] : no respiratory distress  [No Accessory Muscle Use] : no accessory muscle use [Clear to Auscultation] : lungs were clear to auscultation bilaterally [Normal Rate] : normal rate  [Regular Rhythm] : with a regular rhythm [Normal S1, S2] : normal S1 and S2 [Normal Affect] : the affect was normal [Alert and Oriented x3] : oriented to person, place, and time [de-identified] : wheelchair

## 2024-02-19 ENCOUNTER — NON-APPOINTMENT (OUTPATIENT)
Age: 69
End: 2024-02-19

## 2024-02-19 LAB
25(OH)D3 SERPL-MCNC: 22.5 NG/ML
ALBUMIN SERPL ELPH-MCNC: 4 G/DL
ALP BLD-CCNC: 184 U/L
ALT SERPL-CCNC: 14 U/L
ANION GAP SERPL CALC-SCNC: 10 MMOL/L
AST SERPL-CCNC: 25 U/L
BILIRUB SERPL-MCNC: 0.3 MG/DL
BUN SERPL-MCNC: 28 MG/DL
CALCIUM SERPL-MCNC: 8.9 MG/DL
CHLORIDE SERPL-SCNC: 113 MMOL/L
CHOLEST SERPL-MCNC: 190 MG/DL
CO2 SERPL-SCNC: 13 MMOL/L
CREAT SERPL-MCNC: 2.82 MG/DL
EGFR: 18 ML/MIN/1.73M2
ESTIMATED AVERAGE GLUCOSE: 85 MG/DL
GLUCOSE SERPL-MCNC: 89 MG/DL
HBA1C MFR BLD HPLC: 4.6 %
HCT VFR BLD CALC: 33.8 %
HCT VFR BLD CALC: 34.4 %
HDLC SERPL-MCNC: 102 MG/DL
HGB BLD-MCNC: 10.2 G/DL
HGB BLD-MCNC: 10.3 G/DL
LDLC SERPL CALC-MCNC: 72 MG/DL
MCHC RBC-ENTMCNC: 30.2 GM/DL
MCHC RBC-ENTMCNC: 31.9 PG
MCV RBC AUTO: 105.6 FL
NONHDLC SERPL-MCNC: 88 MG/DL
PLATELET # BLD AUTO: 222 K/UL
POTASSIUM SERPL-SCNC: 6.1 MMOL/L
PROT SERPL-MCNC: 7.1 G/DL
RBC # BLD: 3.2 M/UL
RBC # FLD: 18.1 %
SODIUM SERPL-SCNC: 135 MMOL/L
TRIGL SERPL-MCNC: 92 MG/DL
TSH SERPL-ACNC: 8.78 UIU/ML
VIT B12 SERPL-MCNC: 567 PG/ML
WBC # FLD AUTO: 4.98 K/UL

## 2024-02-28 ENCOUNTER — NON-APPOINTMENT (OUTPATIENT)
Age: 69
End: 2024-02-28

## 2024-02-28 ENCOUNTER — APPOINTMENT (OUTPATIENT)
Dept: INTERNAL MEDICINE | Facility: CLINIC | Age: 69
End: 2024-02-28
Payer: MEDICARE

## 2024-02-28 VITALS
WEIGHT: 209.5 LBS | HEIGHT: 68 IN | BODY MASS INDEX: 31.75 KG/M2 | TEMPERATURE: 97.9 F | SYSTOLIC BLOOD PRESSURE: 116 MMHG | OXYGEN SATURATION: 97 % | HEART RATE: 78 BPM | DIASTOLIC BLOOD PRESSURE: 70 MMHG

## 2024-02-28 DIAGNOSIS — I48.91 UNSPECIFIED ATRIAL FIBRILLATION: ICD-10-CM

## 2024-02-28 DIAGNOSIS — E87.5 HYPERKALEMIA: ICD-10-CM

## 2024-02-28 DIAGNOSIS — Z91.148 PATIENT'S OTHER NONCOMPLIANCE WITH MEDICATION REGIMEN FOR OTHER REASON: ICD-10-CM

## 2024-02-28 PROCEDURE — 36415 COLL VENOUS BLD VENIPUNCTURE: CPT

## 2024-02-28 PROCEDURE — 93000 ELECTROCARDIOGRAM COMPLETE: CPT

## 2024-02-28 PROCEDURE — 99214 OFFICE O/P EST MOD 30 MIN: CPT

## 2024-02-28 RX ORDER — SPIRONOLACTONE 50 MG/1
50 TABLET ORAL DAILY
Qty: 90 | Refills: 1 | Status: DISCONTINUED | COMMUNITY
Start: 2022-09-23 | End: 2024-02-28

## 2024-02-28 NOTE — HISTORY OF PRESENT ILLNESS
[de-identified] : follow up hyperkalemia no acute muscle or cardiac complaints since notification of the elevated K+ level she discontinued the spironolactone as instructed on voicemail she is requesting to check her uric acid level as well  notes that she is having trouble with medication adherence she knows there are some she doesn't take (and can only remember some of their names), but also is unsure of what she IS taking she states there are so many pills that she is just overwhelmed

## 2024-02-28 NOTE — HEALTH RISK ASSESSMENT
[Yes] : Yes [2 - 3 times a week (3 pts)] : 2 - 3  times a week (3 points) [Never (0 pts)] : Never (0 points) [1 or 2 (0 pts)] : 1 or 2 (0 points) [No] : In the past 12 months have you used drugs other than those required for medical reasons? No [No falls in past year] : Patient reported no falls in the past year [3] : 2) Feeling down, depressed, or hopeless for nearly every day (3) [de-identified] : no [de-identified] : no [de-identified] : poor [de-identified] : legs swollen  (gout) [Audit-CScore] : 3 [[id number]] : 6

## 2024-02-28 NOTE — PHYSICAL EXAM
[No Acute Distress] : no acute distress [Normal Sclera/Conjunctiva] : normal sclera/conjunctiva [Normal Outer Ear/Nose] : the outer ears and nose were normal in appearance [No Respiratory Distress] : no respiratory distress  [No Accessory Muscle Use] : no accessory muscle use [Clear to Auscultation] : lungs were clear to auscultation bilaterally [Regular Rhythm] : with a regular rhythm [Normal Rate] : normal rate  [Normal S1, S2] : normal S1 and S2 [Normal Affect] : the affect was normal [Alert and Oriented x3] : oriented to person, place, and time [de-identified] : wheelchair

## 2024-03-06 LAB
ALBUMIN SERPL ELPH-MCNC: 3.7 G/DL
ALP BLD-CCNC: 161 U/L
ALT SERPL-CCNC: 11 U/L
ANION GAP SERPL CALC-SCNC: 13 MMOL/L
AST SERPL-CCNC: 21 U/L
BILIRUB SERPL-MCNC: 0.4 MG/DL
BUN SERPL-MCNC: 29 MG/DL
CALCIUM SERPL-MCNC: 8.6 MG/DL
CHLORIDE SERPL-SCNC: 107 MMOL/L
CO2 SERPL-SCNC: 12 MMOL/L
CREAT SERPL-MCNC: 2.81 MG/DL
EGFR: 18 ML/MIN/1.73M2
GLUCOSE SERPL-MCNC: 93 MG/DL
POTASSIUM SERPL-SCNC: 5.6 MMOL/L
PROT SERPL-MCNC: 6.8 G/DL
SODIUM SERPL-SCNC: 133 MMOL/L
URATE SERPL-MCNC: 8.5 MG/DL

## 2024-03-26 ENCOUNTER — LABORATORY RESULT (OUTPATIENT)
Age: 69
End: 2024-03-26

## 2024-03-26 ENCOUNTER — APPOINTMENT (OUTPATIENT)
Dept: INTERNAL MEDICINE | Facility: CLINIC | Age: 69
End: 2024-03-26
Payer: MEDICARE

## 2024-03-26 VITALS
TEMPERATURE: 97.3 F | HEART RATE: 78 BPM | BODY MASS INDEX: 31.42 KG/M2 | OXYGEN SATURATION: 97 % | HEIGHT: 68 IN | WEIGHT: 207.3 LBS | SYSTOLIC BLOOD PRESSURE: 119 MMHG | DIASTOLIC BLOOD PRESSURE: 69 MMHG

## 2024-03-26 DIAGNOSIS — Z13.228 ENCOUNTER FOR SCREENING FOR OTHER METABOLIC DISORDERS: ICD-10-CM

## 2024-03-26 PROCEDURE — G2211 COMPLEX E/M VISIT ADD ON: CPT

## 2024-03-26 PROCEDURE — 99213 OFFICE O/P EST LOW 20 MIN: CPT

## 2024-03-26 PROCEDURE — 36415 COLL VENOUS BLD VENIPUNCTURE: CPT

## 2024-03-26 NOTE — HEALTH RISK ASSESSMENT
[Yes] : Yes [2 - 3 times a week (3 pts)] : 2 - 3  times a week (3 points) [1 or 2 (0 pts)] : 1 or 2 (0 points) [Never (0 pts)] : Never (0 points) [No] : In the past 12 months have you used drugs other than those required for medical reasons? No [No falls in past year] : Patient reported no falls in the past year [0] : 1) Little interest or pleasure doing things: Not at all (0) [Feeling down, depressed, or hopeless] : 2) Feeling down, depressed, or hopeless [3] : 2) Feeling down, depressed, or hopeless for nearly every day (3) [Never] : Never [de-identified] : no [de-identified] : no [Audit-CScore] : 3 [de-identified] : none [de-identified] : poor

## 2024-03-26 NOTE — PHYSICAL EXAM
[Normal] : no acute distress, well nourished, well developed and well-appearing [Normal Sclera/Conjunctiva] : normal sclera/conjunctiva [Normal Outer Ear/Nose] : the outer ears and nose were normal in appearance [No Respiratory Distress] : no respiratory distress  [Normal Rate] : normal rate  [de-identified] : in wheelchair

## 2024-03-26 NOTE — HISTORY OF PRESENT ILLNESS
[FreeTextEntry1] : CKD  [de-identified] : The patient is a 68-year-old F who presents to the office for the following concerns: She denies any acute concerns today  CKD - patient needs blood work for nephrologist. Follow up 4/11 - Dr. Chacko  Spironolactone discontinued in the setting of hyperkalemia

## 2024-04-04 LAB
ALBUMIN SERPL ELPH-MCNC: 3.9 G/DL
ALP BLD-CCNC: 200 U/L
ALT SERPL-CCNC: 11 U/L
ANION GAP SERPL CALC-SCNC: 13 MMOL/L
AST SERPL-CCNC: 20 U/L
BASOPHILS # BLD AUTO: 0.11 K/UL
BASOPHILS NFR BLD AUTO: 1.7 %
BILIRUB SERPL-MCNC: 0.2 MG/DL
BUN SERPL-MCNC: 21 MG/DL
CALCIUM SERPL-MCNC: 8.6 MG/DL
CHLORIDE SERPL-SCNC: 110 MMOL/L
CO2 SERPL-SCNC: 11 MMOL/L
CREAT SERPL-MCNC: 2.66 MG/DL
EGFR: 19 ML/MIN/1.73M2
EOSINOPHIL # BLD AUTO: 0 K/UL
EOSINOPHIL NFR BLD AUTO: 0 %
GLUCOSE SERPL-MCNC: 103 MG/DL
HCT VFR BLD CALC: 32.2 %
HGB BLD-MCNC: 10.1 G/DL
LYMPHOCYTES # BLD AUTO: 2.03 K/UL
LYMPHOCYTES NFR BLD AUTO: 32.5 %
MAN DIFF?: NORMAL
MCHC RBC-ENTMCNC: 31.4 GM/DL
MCHC RBC-ENTMCNC: 33.2 PG
MCV RBC AUTO: 105.9 FL
MONOCYTES # BLD AUTO: 0.16 K/UL
MONOCYTES NFR BLD AUTO: 2.6 %
NEUTROPHILS # BLD AUTO: 3.94 K/UL
NEUTROPHILS NFR BLD AUTO: 63.2 %
PLATELET # BLD AUTO: 241 K/UL
POTASSIUM SERPL-SCNC: 5.4 MMOL/L
PROT SERPL-MCNC: 7.3 G/DL
RBC # BLD: 3.04 M/UL
RBC # FLD: 17.2 %
SODIUM SERPL-SCNC: 134 MMOL/L
WBC # FLD AUTO: 6.24 K/UL

## 2024-05-06 RX ORDER — COLCHICINE 0.6 MG/1
0.6 TABLET ORAL TWICE DAILY
Qty: 180 | Refills: 1 | Status: ACTIVE | COMMUNITY
Start: 2019-09-26 | End: 1900-01-01

## 2024-05-13 RX ORDER — APIXABAN 5 MG/1
5 TABLET, FILM COATED ORAL
Qty: 180 | Refills: 1 | Status: ACTIVE | COMMUNITY
Start: 2018-09-07 | End: 1900-01-01

## 2024-05-21 ENCOUNTER — NON-APPOINTMENT (OUTPATIENT)
Age: 69
End: 2024-05-21

## 2024-05-29 ENCOUNTER — APPOINTMENT (OUTPATIENT)
Dept: HEART AND VASCULAR | Facility: CLINIC | Age: 69
End: 2024-05-29
Payer: MEDICARE

## 2024-05-29 ENCOUNTER — NON-APPOINTMENT (OUTPATIENT)
Age: 69
End: 2024-05-29

## 2024-05-29 VITALS
DIASTOLIC BLOOD PRESSURE: 75 MMHG | HEIGHT: 68 IN | OXYGEN SATURATION: 100 % | BODY MASS INDEX: 30.46 KG/M2 | TEMPERATURE: 97.9 F | HEART RATE: 85 BPM | WEIGHT: 201 LBS | SYSTOLIC BLOOD PRESSURE: 112 MMHG

## 2024-05-29 DIAGNOSIS — I10 ESSENTIAL (PRIMARY) HYPERTENSION: ICD-10-CM

## 2024-05-29 DIAGNOSIS — R06.09 OTHER FORMS OF DYSPNEA: ICD-10-CM

## 2024-05-29 DIAGNOSIS — E78.2 MIXED HYPERLIPIDEMIA: ICD-10-CM

## 2024-05-29 DIAGNOSIS — I50.9 HEART FAILURE, UNSPECIFIED: ICD-10-CM

## 2024-05-29 DIAGNOSIS — E78.5 HYPERLIPIDEMIA, UNSPECIFIED: ICD-10-CM

## 2024-05-29 DIAGNOSIS — R79.89 OTHER SPECIFIED ABNORMAL FINDINGS OF BLOOD CHEMISTRY: ICD-10-CM

## 2024-05-29 PROCEDURE — 99214 OFFICE O/P EST MOD 30 MIN: CPT | Mod: 25

## 2024-05-29 PROCEDURE — 36415 COLL VENOUS BLD VENIPUNCTURE: CPT

## 2024-05-29 PROCEDURE — 93000 ELECTROCARDIOGRAM COMPLETE: CPT

## 2024-05-29 NOTE — PHYSICAL EXAM
[Well Developed] : well developed [Well Nourished] : well nourished [No Acute Distress] : no acute distress [Normal Conjunctiva] : normal conjunctiva [Normal Venous Pressure] : normal venous pressure [No Carotid Bruit] : no carotid bruit [Normal S1, S2] : normal S1, S2 [No Rub] : no rub [No Gallop] : no gallop [Clear Lung Fields] : clear lung fields [Good Air Entry] : good air entry [No Respiratory Distress] : no respiratory distress  [Soft] : abdomen soft [Non Tender] : non-tender [No Masses/organomegaly] : no masses/organomegaly [Normal Bowel Sounds] : normal bowel sounds [No Edema] : no edema [No Cyanosis] : no cyanosis [No Clubbing] : no clubbing [No Rash] : no rash [Moves all extremities] : moves all extremities [Normal Speech] : normal speech [Alert and Oriented] : alert and oriented [de-identified] : renae [de-identified] : 2/4 diastolic murmur [de-identified] : wheelchair

## 2024-05-29 NOTE — DISCUSSION/SUMMARY
[FreeTextEntry1] : EKG:atrial rhythm will chk bnp feel fish is multifactorial and cHF may be contributing- spironolactone stopped due to hyperkalemia continue lipitor for HLD;- will chk BNP and if high- will speak to dr Willis about adding Lasix as therapeutic trial- await mRI to determine if AR playing a role in her sx no evidence of fluid overload and bNP can be elevated with CRI

## 2024-05-29 NOTE — HISTORY OF PRESENT ILLNESS
[FreeTextEntry1] : has her chronic SOB- MRI finally approved- no cp sees Dr Willis( ([phone]) for renal

## 2024-05-30 ENCOUNTER — NON-APPOINTMENT (OUTPATIENT)
Age: 69
End: 2024-05-30

## 2024-05-30 LAB
ANION GAP SERPL CALC-SCNC: 15 MMOL/L
BUN SERPL-MCNC: 21 MG/DL
CALCIUM SERPL-MCNC: 8.5 MG/DL
CHLORIDE SERPL-SCNC: 109 MMOL/L
CO2 SERPL-SCNC: 10 MMOL/L
CREAT SERPL-MCNC: 3.05 MG/DL
CYSTATIN C SERPL-MCNC: 3.67 MG/L
EGFR: 16 ML/MIN/1.73M2
GFR/BSA.PRED SERPLBLD CYS-BASED-ARV: 13 ML/MIN/1.73M2
GLUCOSE SERPL-MCNC: 95 MG/DL
NT-PROBNP SERPL-MCNC: 1555 PG/ML
POTASSIUM SERPL-SCNC: 4.7 MMOL/L
SODIUM SERPL-SCNC: 135 MMOL/L

## 2024-05-30 RX ORDER — FUROSEMIDE 40 MG/1
40 TABLET ORAL DAILY
Qty: 1 | Refills: 0 | Status: ACTIVE | COMMUNITY
Start: 2024-05-30 | End: 1900-01-01

## 2024-06-11 ENCOUNTER — APPOINTMENT (OUTPATIENT)
Dept: PULMONOLOGY | Facility: CLINIC | Age: 69
End: 2024-06-11

## 2024-06-26 ENCOUNTER — APPOINTMENT (OUTPATIENT)
Dept: INTERNAL MEDICINE | Facility: CLINIC | Age: 69
End: 2024-06-26
Payer: MEDICARE

## 2024-06-26 ENCOUNTER — LABORATORY RESULT (OUTPATIENT)
Age: 69
End: 2024-06-26

## 2024-06-26 VITALS
HEART RATE: 76 BPM | OXYGEN SATURATION: 99 % | DIASTOLIC BLOOD PRESSURE: 67 MMHG | HEIGHT: 68 IN | TEMPERATURE: 97.7 F | SYSTOLIC BLOOD PRESSURE: 120 MMHG

## 2024-06-26 DIAGNOSIS — N18.9 CHRONIC KIDNEY DISEASE, UNSPECIFIED: ICD-10-CM

## 2024-06-26 DIAGNOSIS — N18.4 CHRONIC KIDNEY DISEASE, STAGE 4 (SEVERE): ICD-10-CM

## 2024-06-26 DIAGNOSIS — E03.9 HYPOTHYROIDISM, UNSPECIFIED: ICD-10-CM

## 2024-06-26 DIAGNOSIS — J44.9 CHRONIC OBSTRUCTIVE PULMONARY DISEASE, UNSPECIFIED: ICD-10-CM

## 2024-06-26 PROCEDURE — 36415 COLL VENOUS BLD VENIPUNCTURE: CPT

## 2024-06-26 PROCEDURE — 99214 OFFICE O/P EST MOD 30 MIN: CPT

## 2024-06-26 PROCEDURE — G2211 COMPLEX E/M VISIT ADD ON: CPT

## 2024-06-26 RX ORDER — FLUTICASONE FUROATE, UMECLIDINIUM BROMIDE AND VILANTEROL TRIFENATATE 200; 62.5; 25 UG/1; UG/1; UG/1
200-62.5-25 POWDER RESPIRATORY (INHALATION)
Qty: 1 | Refills: 3 | Status: ACTIVE | COMMUNITY
Start: 2022-05-12 | End: 1900-01-01

## 2024-06-26 RX ORDER — IPRATROPIUM BROMIDE AND ALBUTEROL 20; 100 UG/1; UG/1
20-100 SPRAY, METERED RESPIRATORY (INHALATION) 4 TIMES DAILY
Qty: 1 | Refills: 3 | Status: ACTIVE | COMMUNITY
Start: 2024-06-26 | End: 1900-01-01

## 2024-06-27 LAB — TSH SERPL-ACNC: 9.56 UIU/ML

## 2024-09-11 ENCOUNTER — APPOINTMENT (OUTPATIENT)
Dept: HEART AND VASCULAR | Facility: CLINIC | Age: 69
End: 2024-09-11
Payer: MEDICARE

## 2024-09-11 ENCOUNTER — APPOINTMENT (OUTPATIENT)
Dept: INTERNAL MEDICINE | Facility: CLINIC | Age: 69
End: 2024-09-11
Payer: MEDICARE

## 2024-09-11 ENCOUNTER — NON-APPOINTMENT (OUTPATIENT)
Age: 69
End: 2024-09-11

## 2024-09-11 VITALS
OXYGEN SATURATION: 99 % | SYSTOLIC BLOOD PRESSURE: 122 MMHG | HEIGHT: 68 IN | DIASTOLIC BLOOD PRESSURE: 84 MMHG | HEART RATE: 75 BPM

## 2024-09-11 DIAGNOSIS — I50.9 HEART FAILURE, UNSPECIFIED: ICD-10-CM

## 2024-09-11 DIAGNOSIS — R06.09 OTHER FORMS OF DYSPNEA: ICD-10-CM

## 2024-09-11 DIAGNOSIS — R60.0 LOCALIZED EDEMA: ICD-10-CM

## 2024-09-11 DIAGNOSIS — I10 ESSENTIAL (PRIMARY) HYPERTENSION: ICD-10-CM

## 2024-09-11 DIAGNOSIS — R06.02 SHORTNESS OF BREATH: ICD-10-CM

## 2024-09-11 DIAGNOSIS — E78.2 MIXED HYPERLIPIDEMIA: ICD-10-CM

## 2024-09-11 PROCEDURE — G0008: CPT

## 2024-09-11 PROCEDURE — 36415 COLL VENOUS BLD VENIPUNCTURE: CPT

## 2024-09-11 PROCEDURE — 93000 ELECTROCARDIOGRAM COMPLETE: CPT

## 2024-09-11 PROCEDURE — 90662 IIV NO PRSV INCREASED AG IM: CPT

## 2024-09-11 PROCEDURE — G2211 COMPLEX E/M VISIT ADD ON: CPT

## 2024-09-11 PROCEDURE — 99214 OFFICE O/P EST MOD 30 MIN: CPT

## 2024-09-11 NOTE — PHYSICAL EXAM
[Well Developed] : well developed [Well Nourished] : well nourished [No Acute Distress] : no acute distress [Normal Conjunctiva] : normal conjunctiva [Normal Venous Pressure] : normal venous pressure [No Carotid Bruit] : no carotid bruit [Normal S1, S2] : normal S1, S2 [No Rub] : no rub [No Gallop] : no gallop [Clear Lung Fields] : clear lung fields [Good Air Entry] : good air entry [No Respiratory Distress] : no respiratory distress  [Soft] : abdomen soft [Non Tender] : non-tender [No Masses/organomegaly] : no masses/organomegaly [Normal Bowel Sounds] : normal bowel sounds [No Cyanosis] : no cyanosis [No Clubbing] : no clubbing [No Rash] : no rash [Moves all extremities] : moves all extremities [Normal Speech] : normal speech [Alert and Oriented] : alert and oriented [de-identified] : renae [de-identified] : 2/4 diastolic murmur [de-identified] : wheelchair [de-identified] : tarce edema

## 2024-09-11 NOTE — HISTORY OF PRESENT ILLNESS
[FreeTextEntry1] : c/o N/V and inability to hold liquids- has leg swelling has chronic sob- unsure if she's better on Lasix and states was given another diuretic and not sure which one she's taking she had cardiac MRI at [facility]- we have called several times for report and today we are told that we need to fax request to them

## 2024-09-11 NOTE — DISCUSSION/SUMMARY
[FreeTextEntry1] : EKG:NSR,First degree AVB feel sob is multifactorial- continue on diuretics- she may be in ARF( N/V x weeks and known CRI- advised to go to ER continue lipitor for HLD/lasix(diuretic for HPTN) refusing to go to ER - asked me to check labs for kidneys

## 2024-09-25 NOTE — DIETITIAN INITIAL EVALUATION ADULT. - SIGNS/SYMPTOMS
Detail Level: Detailed
Prescription Strength Graduated Compression Stockings Recommendations: The patient was counseled that prescription strength graduated compression stockings should be worn for all waking hours. They will follow up with a venous specialist to monitor graduated compression stocking usage and their symptoms.
AEB pt unable to state protein and vitamin needs

## 2024-10-21 ENCOUNTER — LABORATORY RESULT (OUTPATIENT)
Age: 69
End: 2024-10-21

## 2024-10-21 ENCOUNTER — APPOINTMENT (OUTPATIENT)
Dept: INTERNAL MEDICINE | Facility: CLINIC | Age: 69
End: 2024-10-21
Payer: MEDICARE

## 2024-10-21 VITALS
DIASTOLIC BLOOD PRESSURE: 71 MMHG | HEIGHT: 68 IN | TEMPERATURE: 97.6 F | OXYGEN SATURATION: 99 % | HEART RATE: 93 BPM | SYSTOLIC BLOOD PRESSURE: 102 MMHG

## 2024-10-21 DIAGNOSIS — N18.4 CHRONIC KIDNEY DISEASE, STAGE 4 (SEVERE): ICD-10-CM

## 2024-10-21 DIAGNOSIS — Z13.228 ENCOUNTER FOR SCREENING FOR OTHER METABOLIC DISORDERS: ICD-10-CM

## 2024-10-21 DIAGNOSIS — E03.9 HYPOTHYROIDISM, UNSPECIFIED: ICD-10-CM

## 2024-10-21 PROCEDURE — 36415 COLL VENOUS BLD VENIPUNCTURE: CPT

## 2024-10-21 PROCEDURE — 99214 OFFICE O/P EST MOD 30 MIN: CPT

## 2024-10-21 PROCEDURE — G2211 COMPLEX E/M VISIT ADD ON: CPT

## 2024-10-29 ENCOUNTER — NON-APPOINTMENT (OUTPATIENT)
Age: 69
End: 2024-10-29

## 2024-10-29 LAB
25(OH)D3 SERPL-MCNC: 17.2 NG/ML
ANION GAP SERPL CALC-SCNC: 14 MMOL/L
BASOPHILS # BLD AUTO: 0.01 K/UL
BASOPHILS NFR BLD AUTO: 0.1 %
BUN SERPL-MCNC: 37 MG/DL
CALCIUM SERPL-MCNC: 8.8 MG/DL
CHLORIDE SERPL-SCNC: 111 MMOL/L
CO2 SERPL-SCNC: 12 MMOL/L
CREAT SERPL-MCNC: 3.29 MG/DL
EGFR: 15 ML/MIN/1.73M2
EOSINOPHIL # BLD AUTO: 0.02 K/UL
EOSINOPHIL NFR BLD AUTO: 0.3 %
GLUCOSE SERPL-MCNC: 88 MG/DL
HCT VFR BLD CALC: 34.4 %
HGB BLD-MCNC: 10.6 G/DL
IMM GRANULOCYTES NFR BLD AUTO: 0.7 %
LYMPHOCYTES # BLD AUTO: 1.14 K/UL
LYMPHOCYTES NFR BLD AUTO: 16.9 %
MAN DIFF?: NORMAL
MCHC RBC-ENTMCNC: 30.8 GM/DL
MCHC RBC-ENTMCNC: 33.4 PG
MCV RBC AUTO: 108.5 FL
MONOCYTES # BLD AUTO: 0.48 K/UL
MONOCYTES NFR BLD AUTO: 7.1 %
NEUTROPHILS # BLD AUTO: 5.05 K/UL
NEUTROPHILS NFR BLD AUTO: 74.9 %
PLATELET # BLD AUTO: 179 K/UL
POTASSIUM SERPL-SCNC: 5.4 MMOL/L
RBC # BLD: 3.17 M/UL
RBC # FLD: 18 %
SODIUM SERPL-SCNC: 137 MMOL/L
TSH SERPL-ACNC: 5.94 UIU/ML
WBC # FLD AUTO: 6.75 K/UL

## 2024-10-30 ENCOUNTER — APPOINTMENT (OUTPATIENT)
Dept: INTERNAL MEDICINE | Facility: CLINIC | Age: 69
End: 2024-10-30
